# Patient Record
Sex: MALE | Race: WHITE
[De-identification: names, ages, dates, MRNs, and addresses within clinical notes are randomized per-mention and may not be internally consistent; named-entity substitution may affect disease eponyms.]

---

## 2018-10-02 ENCOUNTER — HOSPITAL ENCOUNTER (INPATIENT)
Dept: HOSPITAL 75 - 4TH | Age: 78
LOS: 1 days | Discharge: HOME | DRG: 433 | End: 2018-10-03
Attending: INTERNAL MEDICINE | Admitting: INTERNAL MEDICINE
Payer: MEDICARE

## 2018-10-02 VITALS — DIASTOLIC BLOOD PRESSURE: 79 MMHG | SYSTOLIC BLOOD PRESSURE: 124 MMHG

## 2018-10-02 VITALS — WEIGHT: 179 LBS | BODY MASS INDEX: 26.51 KG/M2 | HEIGHT: 69 IN

## 2018-10-02 VITALS — DIASTOLIC BLOOD PRESSURE: 70 MMHG | SYSTOLIC BLOOD PRESSURE: 118 MMHG

## 2018-10-02 VITALS — DIASTOLIC BLOOD PRESSURE: 82 MMHG | SYSTOLIC BLOOD PRESSURE: 116 MMHG

## 2018-10-02 DIAGNOSIS — D68.9: ICD-10-CM

## 2018-10-02 DIAGNOSIS — K70.31: Primary | ICD-10-CM

## 2018-10-02 DIAGNOSIS — R73.09: ICD-10-CM

## 2018-10-02 DIAGNOSIS — R00.0: ICD-10-CM

## 2018-10-02 DIAGNOSIS — I10: ICD-10-CM

## 2018-10-02 DIAGNOSIS — E87.6: ICD-10-CM

## 2018-10-02 DIAGNOSIS — F10.20: ICD-10-CM

## 2018-10-02 LAB
ALBUMIN SERPL-MCNC: 3 GM/DL (ref 3.2–4.5)
ALP SERPL-CCNC: 65 U/L (ref 40–136)
ALT SERPL-CCNC: 23 U/L (ref 0–55)
BASOPHILS # BLD AUTO: 0 10^3/UL (ref 0–0.1)
BASOPHILS NFR BLD AUTO: 1 % (ref 0–10)
BASOPHILS NFR BLD MANUAL: 0 %
BILIRUB SERPL-MCNC: 1.5 MG/DL (ref 0.1–1)
BUN/CREAT SERPL: 15
CALCIUM SERPL-MCNC: 8.8 MG/DL (ref 8.5–10.1)
CHLORIDE SERPL-SCNC: 101 MMOL/L (ref 98–107)
CO2 SERPL-SCNC: 25 MMOL/L (ref 21–32)
CREAT SERPL-MCNC: 0.66 MG/DL (ref 0.6–1.3)
EOSINOPHIL # BLD AUTO: 0 10^3/UL (ref 0–0.3)
EOSINOPHIL NFR BLD AUTO: 0 % (ref 0–10)
EOSINOPHIL NFR BLD MANUAL: 0 %
ERYTHROCYTE [DISTWIDTH] IN BLOOD BY AUTOMATED COUNT: 11.7 % (ref 10–14.5)
GFR SERPLBLD BASED ON 1.73 SQ M-ARVRAT: > 60 ML/MIN
GLUCOSE SERPL-MCNC: 100 MG/DL (ref 70–105)
HCT VFR BLD CALC: 36 % (ref 40–54)
HGB BLD-MCNC: 13 G/DL (ref 13.3–17.7)
LYMPHOCYTES # BLD AUTO: 1.2 X 10^3 (ref 1–4)
LYMPHOCYTES NFR BLD AUTO: 19 % (ref 12–44)
MACROCYTES BLD QL SMEAR: SLIGHT
MANUAL DIFFERENTIAL PERFORMED BLD QL: YES
MCH RBC QN AUTO: 39 PG (ref 25–34)
MCHC RBC AUTO-ENTMCNC: 36 G/DL (ref 32–36)
MCV RBC AUTO: 108 FL (ref 80–99)
MONOCYTES # BLD AUTO: 1.1 X 10^3 (ref 0–1)
MONOCYTES NFR BLD AUTO: 19 % (ref 0–12)
MONOCYTES NFR BLD: 12 %
NEUTROPHILS # BLD AUTO: 3.7 X 10^3 (ref 1.8–7.8)
NEUTROPHILS NFR BLD AUTO: 61 % (ref 42–75)
NEUTS BAND NFR BLD MANUAL: 70 %
NEUTS BAND NFR BLD: 1 %
PLATELET # BLD: 219 10^3/UL (ref 130–400)
PMV BLD AUTO: 9.8 FL (ref 7.4–10.4)
POTASSIUM SERPL-SCNC: 3.6 MMOL/L (ref 3.6–5)
PROT SERPL-MCNC: 6.7 GM/DL (ref 6.4–8.2)
RBC # BLD AUTO: 3.35 10^6/UL (ref 4.35–5.85)
SODIUM SERPL-SCNC: 139 MMOL/L (ref 135–145)
VARIANT LYMPHS NFR BLD MANUAL: 17 %
WBC # BLD AUTO: 6 10^3/UL (ref 4.3–11)

## 2018-10-02 PROCEDURE — 89051 BODY FLUID CELL COUNT: CPT

## 2018-10-02 PROCEDURE — 85027 COMPLETE CBC AUTOMATED: CPT

## 2018-10-02 PROCEDURE — 82042 OTHER SOURCE ALBUMIN QUAN EA: CPT

## 2018-10-02 PROCEDURE — 80053 COMPREHEN METABOLIC PANEL: CPT

## 2018-10-02 PROCEDURE — 87205 SMEAR GRAM STAIN: CPT

## 2018-10-02 PROCEDURE — 76705 ECHO EXAM OF ABDOMEN: CPT

## 2018-10-02 PROCEDURE — 85025 COMPLETE CBC W/AUTO DIFF WBC: CPT

## 2018-10-02 PROCEDURE — 85610 PROTHROMBIN TIME: CPT

## 2018-10-02 PROCEDURE — 84157 ASSAY OF PROTEIN OTHER: CPT

## 2018-10-02 PROCEDURE — 83615 LACTATE (LD) (LDH) ENZYME: CPT

## 2018-10-02 PROCEDURE — 36415 COLL VENOUS BLD VENIPUNCTURE: CPT

## 2018-10-02 PROCEDURE — 87070 CULTURE OTHR SPECIMN AEROBIC: CPT

## 2018-10-02 PROCEDURE — 85007 BL SMEAR W/DIFF WBC COUNT: CPT

## 2018-10-02 NOTE — HISTORY & PHYSICAL-HOSPITALIST
History of Present Illness


HPI/Chief Complaint


The patient is a 77-year-old white male referred to the hospitalist service by 

Dr. Alexander.  He reported that the patient drank wine on a daily basis.  He had 

advised him to stop some days months ago but that did not happen.  He described 

ascites.  When I asked the patient how long this had been coming on he said 2 

or 3 months.  His wife stated that this was in fact a year or more.  He buys 

wine in 5 L boxes.  She states those last about 3 days.





He describes no other health problems.


Date Seen


10/2/18


Time Seen by a Provider:  12:46


Attending Physician


Farshad Gonzalez MD


PCP


Chi Alexander MD


Referring Physician





Date of Admission


Oct 2, 2018 at 11:24





Home Medications & Allergies


Home Medications


Reviewed patient Home Medication Reconciliation performed by pharmacy 

medication reconciliations technician and/or nursing.


Patients Allergies have been reviewed.





Allergies





Allergies


Coded Allergies


  No Known Drug Allergies (Unverified7/25/11)








Past Medical-Social-Family Hx


Past Med/Social Hx:  Reviewed Nursing Past Med/Soc Hx


Patient Social History


Recent Foreign Travel:  No


Contact w/other who traveled:  No





Immunizations Up To Date


Date of Influenza Vaccine:  Nov 1, 2015





Review of Systems


Constitutional:  see HPI


EENTM:  no symptoms reported


Respiratory:  no symptoms reported


Cardiovascular:  no symptoms reported


Gastrointestinal:  other (distention)


Genitourinary:  no symptoms reported


Musculoskeletal:  no symptoms reported


Skin:  no symptoms reported


Psychiatric/Neurological:  No Symptoms Reported





Physical Exam


Physical Exam


Vital Signs


Capillary Refill :


Height, Weight, BMI


Height: 5'9.00"


Weight: 179lbs. oz. 81.067008nb; 26.43 BMI


Method:


General Appearance:  Mild Distress


Eyes:  Bilateral Eye Normal Inspection


HEENT:  Normal ENT Inspection


Neck:  Normal Inspection


Respiratory:  Chest Non Tender, Lungs Clear, Normal Breath Sounds, No Accessory 

Muscle Use, No Respiratory Distress


Cardiovascular:  Regular Rate, Rhythm, No Edema, No Gallop, No JVD, No Murmur, 

Normal Peripheral Pulses


Gastrointestinal:  Other (the abdomen is distended and tight.  A fluid wave is 

easily produced.)


Back:  Normal Inspection


Extremity:  Other


Neurologic/Psychiatric:  Alert, Oriented x3, No Motor/Sensory Deficits, Normal 

Mood/Affect


Skin:  Normal Color





Results


Results/Procedures


Labs


Patient resulted labs reviewed.





Assessment/Plan


Admission Diagnosis


Alcoholic cirrhosis.  2.ascites.


Admission Status:  Inpatient Order (span 2 midnights)


Reason for Inpatient Admission:  


Management and treatment of ascites





Assessment and Plan


1.alcoholic cirrhosis and ascites.





Plan: Lab studies.  Initiation of spironolactone and diuretics











FARSHAD GONZALEZ MD Oct 2, 2018 12:51
follow up PMD

## 2018-10-02 NOTE — XMS REPORT
Continuity of Care Document

 Created on: 10/02/2018



INDIRA BENSON

External Reference #: [id number]

: 1940

Sex: Male



Demographics







 Address  [address] [place] [address] [place]  [id number]

 

 Home Phone  [phone] x

 

 Preferred Language  Unknown

 

 Marital Status  Unknown

 

 Christian Affiliation  Unknown

 

 Race  Unknown

 

 Ethnic Group  Unknown





Author







 Author  [facility]

 

 Organization  [facility]

 

 Address  Unknown

 

 Phone  Unavailable



              



Allergies

      





 Active            Description            Code            Type            
Severity            Reaction            Onset            Reported/Identified   
         Relationship to Patient            Clinical Status        

 

 Yes            No Known Drug Allergies            [id number]            Drug 
Allergy            Unknown            N/A                         2011   
                               



                  



Medications

      



There is no data.                  



Problems

      





 Date Dx Coded            Attending            Type            Code            
Diagnosis            Diagnosed By        

 

 2011                         Ot            V16.0            FAMILY HX-GI 
MALIGNANCY                     

 

 2011                         Ot            V58.69            OTH MED,LT,
CURRENT USE                     

 

 2011                         Ot            V76.51            SCREEN MAL 
NEOP-COLON                     

 

 2016                         Ot            722.10                       
           

 

 2016                         Ot            722.52                       
           

 

 2016                         Ot            719.46                       
           

 

 2016                         Ot            722.10                       
           

 

 2016                         Ot            722.52                       
           

 

 2016                         Ot            719.46                       
           

 

 2016                         Ot            722.10                       
           

 

 2016                         Ot            722.52                       
           

 

 2016                         Ot            719.46                       
           

 

 2016            DAYANA FOURNIER MD, FACC FACP CCDS            Ot            
I10            ESSENTIAL (PRIMARY) HYPERTENSION                     

 

 2016            DAYANA FOURNIER MD, FACC FACP CCDS            Ot            
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                      

 

 2016            DAYANA FOURNIER MD, FACC FACP CCDS            Ot            
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                     

 

 2016            DAYANA FOURNIER MD, FACC FACP CCDS            Ot            
R00.0            TACHYCARDIA, UNSPECIFIED                     

 

 2016            DAYANA FOURNIER MD, FACC FACP CCDS            Ot            
R91.8            OTHER NONSPECIFIC ABNORMAL FINDING OF JAKE                     

 

 2016            DAYANA FOURNIER MD, FACC FACP CCDS            Ot            
Z79.899            OTHER LONG TERM (CURRENT) DRUG THERAPY                     

 

 03/10/2016            DAYANA FOURNIER MD, FACC FACP CCDS            Ot            
E78.0                                  

 

 03/10/2016            DAYANA FOURNIER MD, FACC FACP CCDS            Ot            
E80.6                                  

 

 03/10/2016            IRLANDA MD FACC, ALI FACP CCDS            Ot            
I10                                  

 

 03/10/2016            IRLANDA SHI FAC, ALI FACP CCDS            Ot            
R00.0                                  

 

 03/10/2016            IRLANDA SHI FAC, ALI FACP CCDS            Ot            
R74.8                                  

 

 03/10/2016            IRLANDA SHI FAC, ALI FACP CCDS            Ot            
I10                                  

 

 03/10/2016            IRLANDA SHI FACC, ALI FACP CCDS            Ot            
I25.10                                  

 

 03/10/2016            IRLANDA SHI [facility], ALI FACP CCDS            Ot            
I25.84                                  

 

 03/10/2016            IRLANDA SHI [facility], ALI FACP CCDS            Ot            
R00.0                                  

 

 03/10/2016            IRLANDA SHI [facility], ALI FACP CCDS            Ot            
R91.8                                  

 

 03/10/2016            IRLANDA SHI [facility], ALI FACP CCDS            Ot            
Z79.899                                  

 

 2016            IRLANDA SHI [facility], ALI FACP CCDS            Ot            
E78.0                                  

 

 2016            IRLANDA SHI [facility], ALI FACP CCDS            Ot            
E80.6                                  

 

 2016            IRLANDA SHI [facility], ALI FACP CCDS            Ot            
I10                                  

 

 2016            IRLANDA SHI [facility], ALI FACP CCDS            Ot            
R00.0                                  

 

 2016            IRLANDA SHI [facility], ALI FACP CCDS            Ot            
R74.8                                  

 

 2016                         Ot            722.10            LUMBAR DISC 
DISPLACEMENT                     

 

 2016                         Ot            722.52            LUMB/
LUMBOSAC DISC DEGEN                     

 

 2016                         Ot            719.46            JOINT PAIN-L
/LEG                     

 

 2016            AURELIA SHI, CONCEPCION R            Ot            R07.9       
     CHEST PAIN, UNSPECIFIED                     

 

 2016            IRLANDA MARTINEZ, ALI FACP CCDS            Ot            
E78.0            PURE HYPERCHOLESTEROLEMIA                     

 

 2016            IRLANDA MARTINEZ, ALI FACP CCDS            Ot            
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                     

 

 2016            IRLANDA MARTINEZ, ALI FACP CCDS            Ot            
I10            ESSENTIAL (PRIMARY) HYPERTENSION                     

 

 2016            IRLANDA MARTINEZ, ALI FACP CCDS            Ot            
R00.0            TACHYCARDIA, UNSPECIFIED                     

 

 2016            IRLANDA MARTINEZ, ALI FACP CCDS            Ot            
R74.8            ABNORMAL LEVELS OF OTHER SERUM ENZYMES                     

 

 2016            IRLANDA MARTINEZ, ALI FACP CCDS            Ot            
E78.0            PURE HYPERCHOLESTEROLEMIA                     

 

 2016            IRLANDA MARTINEZ, ALI FACP CCDS            Ot            
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                     

 

 2016            IRLANDA SHI [facility], ALI FACP CCDS            Ot            
I10            ESSENTIAL (PRIMARY) HYPERTENSION                     

 

 2016            IRLANDA SHI [facility], ALI FACP CCDS            Ot            
R00.0            TACHYCARDIA, UNSPECIFIED                     

 

 2016            IRLANDA SHI [facility], ALI FACP CCDS            Ot            
R74.8            ABNORMAL LEVELS OF OTHER SERUM ENZYMES                     

 

 2018            CONCEPCION MOSES MD R            Ot            R07.9       
     CHEST PAIN, UNSPECIFIED                     

 

 2018            IRLANDA SHI [facility], ALI FACP CCDS            Ot            
E78.0            PURE HYPERCHOLESTEROLEMIA                     

 

 2018            IRLANDA SHI [facility], ALI FACP CCDS            Ot            
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                     

 

 2018            IRLANDA SHI [facility], ALI FACP CCDS            Ot            
I10            ESSENTIAL (PRIMARY) HYPERTENSION                     

 

 2018            IRLANDA SHI [facility], ALI FACP CCDS            Ot            
R00.0            TACHYCARDIA, UNSPECIFIED                     

 

 2018            IRLANDA SHI [facility], ALI FACP CCDS            Ot            
R74.8            ABNORMAL LEVELS OF OTHER SERUM ENZYMES                     

 

 2018            IRLANDA SHI [facility], ALI FACP CCDS            Ot            
E78.0            PURE HYPERCHOLESTEROLEMIA                     

 

 2018            IRLANDA SHI [facility], ALI FACP CCDS            Ot            
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                     

 

 2018            IRLANDA SHI [facility], ALI FACP CCDS            Ot            
I10            ESSENTIAL (PRIMARY) HYPERTENSION                     

 

 2018            IRLANDA SHI [facility], ALI FACP CCDS            Ot            
R00.0            TACHYCARDIA, UNSPECIFIED                     

 

 2018            IRLANDA SHI [facility], ALI FACP CCDS            Ot            
R74.8            ABNORMAL LEVELS OF OTHER SERUM ENZYMES                     

 

 2018            CONCEPCION MOSES MD            Ot            J90         
   PLEURAL EFFUSION, NOT ELSEWHERE CLASSIFI                     

 

 2018            CONCEPCION MOSES MD            Ot            J98.11      
      ATELECTASIS                     

 

 2018            CONCEPCION MOSES MD            Ot            K57.30      
      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                     

 

 2018            CONCEPCION MOSES MD            Ot            K76.0       
     FATTY (CHANGE OF) LIVER, NOT ELSEWHERE C                     

 

 2018            CONCEPCION MOSES MD            Ot            R18.8       
     OTHER ASCITES                     

 

 2018            CONCEPCION MOSES MD            Ot            R85.89      
      OTH ABNORMAL FINDINGS IN SPECIMENS FROM                      



                                                                               
                                                                   



Procedures

      



There is no data.                  



Results

      



There is no data.              



Encounters

      





 ACCT No.            Visit Date/Time            Discharge            Status    
        Pt. Type            Provider            Facility            Loc./Unit  
          Complaint        

 

 [id number]            2018 09:22:00            2018 23:59:59    
        CLS            Outpatient            CONCEPCION MOSES MD            [facility]            RAD            ABN FINDINGS IN 
SPECIMENS FROM DIGESTIVE ORGANS        

 

 [id number]            2016 09:05:00            2016 15:00:00    
        DIS            Outpatient            IRLANDA HSI FACC, ALI FACP CCDS     
       [facility]            CATH            WIDE COMPLEX 
TACHYCARDIA,HTN,HLP        

 

 [id number]            2016 07:34:00            2016 23:59:59    
        CLS            Outpatient            IRLANDA SHI FACC, ALI FACP CCDS     
       [facility]            RAD            
HYPERBILIRUBIN        

 

 [id number]            2016 09:53:00            2016 23:59:59    
        CLS            Outpatient            IRLANDA SHI FACC, ALI FACP CCDS     
       [facility]            CARD            TACHYCARDIA 
       

 

 [id number]            2016 07:12:00            2016 23:59:59    
        CLS            Outpatient            CONCEPCION MOSES MD            [facility]            CARD            SOA,CHEST PAIN        

 

 [id number]            10/30/2012 14:23:00                                   
   Document Registration                                                       
     

 

 [id number]            2011 09:11:00                                   
   Document Registration                                                       
     

 

 [id number]            2011 12:28:00                                   
   Document Registration

## 2018-10-03 VITALS — DIASTOLIC BLOOD PRESSURE: 76 MMHG | SYSTOLIC BLOOD PRESSURE: 112 MMHG

## 2018-10-03 VITALS — DIASTOLIC BLOOD PRESSURE: 71 MMHG | SYSTOLIC BLOOD PRESSURE: 107 MMHG

## 2018-10-03 VITALS — DIASTOLIC BLOOD PRESSURE: 72 MMHG | SYSTOLIC BLOOD PRESSURE: 110 MMHG

## 2018-10-03 VITALS — SYSTOLIC BLOOD PRESSURE: 106 MMHG | DIASTOLIC BLOOD PRESSURE: 72 MMHG

## 2018-10-03 VITALS — SYSTOLIC BLOOD PRESSURE: 107 MMHG | DIASTOLIC BLOOD PRESSURE: 63 MMHG

## 2018-10-03 LAB
ALBUMIN FLD-MCNC: 1.2 G/DL
ALBUMIN SERPL-MCNC: 3.1 GM/DL (ref 3.2–4.5)
ALP SERPL-CCNC: 65 U/L (ref 40–136)
ALT SERPL-CCNC: 22 U/L (ref 0–55)
APPEARANCE FLD: (no result)
BASOPHILS # BLD AUTO: 0 10^3/UL (ref 0–0.1)
BASOPHILS NFR BLD AUTO: 1 % (ref 0–10)
BILIRUB SERPL-MCNC: 1.7 MG/DL (ref 0.1–1)
BODY FLUID SOURCE: (no result)
BUN/CREAT SERPL: 14
CALCIUM SERPL-MCNC: 8.9 MG/DL (ref 8.5–10.1)
CHLORIDE SERPL-SCNC: 103 MMOL/L (ref 98–107)
CO2 SERPL-SCNC: 24 MMOL/L (ref 21–32)
COLOR FLD: YELLOW
CREAT SERPL-MCNC: 0.79 MG/DL (ref 0.6–1.3)
EOSINOPHIL # BLD AUTO: 0 10^3/UL (ref 0–0.3)
EOSINOPHIL NFR BLD AUTO: 0 % (ref 0–10)
ERYTHROCYTE [DISTWIDTH] IN BLOOD BY AUTOMATED COUNT: 12.1 % (ref 10–14.5)
GFR SERPLBLD BASED ON 1.73 SQ M-ARVRAT: > 60 ML/MIN
GLUCOSE SERPL-MCNC: 202 MG/DL (ref 70–105)
HCT VFR BLD CALC: 39 % (ref 40–54)
HGB BLD-MCNC: 13.5 G/DL (ref 13.3–17.7)
INR PPP: 1.3 (ref 0.8–1.4)
LDH FLD-CCNC: 67 U/L
LYMPHOCYTES # BLD AUTO: 1.1 X 10^3 (ref 1–4)
LYMPHOCYTES NFR BLD AUTO: 20 % (ref 12–44)
LYMPHOCYTES NFR FLD MANUAL: 74 %
MANUAL DIFFERENTIAL PERFORMED BLD QL: NO
MCH RBC QN AUTO: 38 PG (ref 25–34)
MCHC RBC AUTO-ENTMCNC: 35 G/DL (ref 32–36)
MCV RBC AUTO: 111 FL (ref 80–99)
MONOCYTES # BLD AUTO: 0.9 X 10^3 (ref 0–1)
MONOCYTES NFR BLD AUTO: 17 % (ref 0–12)
NEUTROPHILS # BLD AUTO: 3.4 X 10^3 (ref 1.8–7.8)
NEUTROPHILS NFR BLD AUTO: 62 % (ref 42–75)
OTHER CELLS FLD MANUAL: 0 %
PLATELET # BLD: 250 10^3/UL (ref 130–400)
PMV BLD AUTO: 10.1 FL (ref 7.4–10.4)
POTASSIUM SERPL-SCNC: 3.2 MMOL/L (ref 3.6–5)
PROT FLD-MCNC: 2.2 G/DL
PROT SERPL-MCNC: 6.9 GM/DL (ref 6.4–8.2)
PROTHROMBIN TIME: 16.1 SEC (ref 12.2–14.7)
RBC # BLD AUTO: 3.53 10^6/UL (ref 4.35–5.85)
RBC # FLD: 48 /UL
SODIUM SERPL-SCNC: 138 MMOL/L (ref 135–145)
WBC # BLD AUTO: 5.4 10^3/UL (ref 4.3–11)
WBC # FLD: 227 /UL

## 2018-10-03 PROCEDURE — 0W9G30Z DRAINAGE OF PERITONEAL CAVITY WITH DRAINAGE DEVICE, PERCUTANEOUS APPROACH: ICD-10-PCS | Performed by: SURGERY

## 2018-10-03 NOTE — DIAGNOSTIC IMAGING REPORT
PROCEDURE: US Abdomen, limited.



TECHNIQUE: Multiple realtime grayscale images were obtained over

the abdomen in various projections.



INDICATION: Ascites.



FINDINGS: Sonographic interrogation was performed to evaluate

ascites. There appears to be large amount of abdominal ascites. A

marking was made in the right lower quadrant for paracentesis.



IMPRESSION: Large abdominal ascites.



Dictated by: 



  Dictated on workstation # [id number]

## 2018-10-03 NOTE — PROGRESS NOTE-HOSPITALIST
Subjective


HPI/CC On Admission


The patient is a 77-year-old white male referred to the hospitalist service by 

Dr. Alexander.  He reported that the patient drank wine on a daily basis.  He had 

advised him to stop some days months ago but that did not happen.  He described 

ascites.  When I asked the patient how long this had been coming on he said 2 

or 3 months.  His wife stated that this was in fact a year or more.  He buys 

wine in 5 L boxes.  She states those last about 3 days.





He describes no other health problems.





Objective


Exam


Vital Signs





Vital Signs








  Date Time  Temp Pulse Resp B/P (MAP) Pulse Ox O2 Delivery O2 Flow Rate FiO2


 


10/3/18 08:00 99.1 113 20 107/71 (83) 95 Room Air  





Capillary Refill : Less Than 3 Seconds





Results/Procedures


Lab


Laboratory Tests


10/2/18 12:46








10/3/18 10:25








Patient resulted labs reviewed.





Clinical Quality Measures


DVT/VTE Risk/Contraindication:


Risk Factor Score Per Nursing:  3


RFS Level Per Nursing on Admit:  3=High











KEDAR SANFORD DO Oct 3, 2018 10:49

## 2018-10-03 NOTE — OPERATIVE REPORT
DATE OF SERVICE:  10/03/2018



ATTENDING PRIMARY CARE PHYSICIAN:

Chi Alexander MD.



ADMITTING PHYSICIAN:

Dr. Alexandre.



PREPROCEDURE DIAGNOSIS:

Symptomatic ascites.



POSTPROCEDURE DIAGNOSIS:

Symptomatic ascites.



PROCEDURE:

Paracentesis.



SURGEON:

Brenda Hastings MD.



ANESTHESIA:

Local.



ESTIMATED BLOOD LOSS:

Minimal.



FINDINGS:

Straw yellow transudative fluid.



DISPOSITION:

The patient tolerated the procedure well.



INDICATIONS:

The patient is a 77-year-old male, who presented with significant abdominal

distention, which has become symptomatic.  The patient and wife report that they

first noticed some mild abdominal distention one year ago; however, this has

increased over the year and in the past few weeks has increased significantly. 

He does have abdominal distention as well as bilateral lower extremity edema. 

Upon further questioning, he reports that he does drink a significant amount of

wine on a daily basis for many years.  Upon examination, he does have a firmly

distended abdomen with a positive fluid shift wave consistent with ascites.



DESCRIPTION OF PROCEDURE:

The abdomen was prepped and draped in the standard surgical fashion.  Before the

procedure, an ultrasound evaluation was performed and marked in the right lower

abdominal quadrant.



A 1% lidocaine was used to anesthetize the skin, fascia, muscle layers as well

as the peritoneal lining.  A vertical skin incision was made using an 11-blade. 

The trocar and catheter were then advanced withdrawing of straw yellow

transudative fluid.  The catheter was then advanced over the trocar without any

resistance.  The catheter was then placed into tubing and gravity drainage bag. 

The catheter was then cleaned and covered with gauze followed by a large

Op-Site.



The patient tolerated the procedure well.  We will send the fluid off for

analysis.  Once he is asymptomatic and significant amount of fluid has been

drained, we will remove the catheter and he may be discharged home.





Job ID: [id number]

DocumentID: [id number]

Dictated Date:  10/03/2018 12:09:52

Transcription Date: 10/03/2018 14:30:57

Dictated By: BRENDA HASTINGS MD

## 2018-10-03 NOTE — CONSULTATION REPORT
DATE OF SERVICE:  10/03/2018



ATTENDING PRIMARY CARE PHYSICIAN:

Dr. Alexander.



ADMITTING PHYSICIAN:

Dr. Alexandre.



HISTORY OF PRESENT ILLNESS:

The patient is a 77-year-old male who presented with abdominal distention.  He

reports that this has been first noticed approximately one year ago; however,

has worsened significantly especially in the past few days.  He has a positive

fluid shift wave consistent with ascites.  He also did have a CT scan of the

abdomen performed in late 08/2018, which did show ascites as well.  He does have

a significant past history of alcohol and states that he quit recently.  He

reports that he drinks a significant amount of wine daily.  On examination, he

does have a distended abdomen with a positive fluid shift wave which is

causing him to experience pressure on his diaphragm and difficulty in breathing 
on

an intermittent basis.



PAST MEDICAL HISTORY:

Tachycardia.



PAST SURGICAL HISTORY:

Left knee arthroscopy and pilonidal cystectomy.



ALLERGIES:

No known drug allergies.



MEDICATIONS:

Metoprolol 25 mg daily, aspirin 81 mg daily, folic acid daily.



SOCIAL HISTORY:

Positive alcohol, he drinks significant alcohol with wine daily for many years. 

Negative smoke.



FAMILY HISTORY:

Noncontributory.



VITAL SIGNS:

Temperature 99.1, blood pressure 107/71, pulse 113, respirations 20, pulse ox

95% on room air.



REVIEW OF SYSTEMS:

Well-nourished male currently in no acute distress.  He is not experiencing any

shortness of breath, no difficulty breathing.  He reports that with his

abdominal distention, upon lying, he will have some shortness of breath on an

intermittent basis.  No cough or sputum production.  No nausea, vomiting, no

diarrhea or constipation, no red blood per rectum, no dark tarry stools.  No

fever, chills, no recent inadvertent weight loss.  All other review of systems

is negative.



PHYSICAL EXAMINATION:

CHEST:  Clear.  Good breath sounds bilaterally.

HEART:  Regular, no murmurs.

EXTREMITIES:  +2/3 bilateral lower extremity edema.  Negative Homans sign.

HEENT:  No scleral icterus.

NECK:  No cervical lymphadenopathy.

ABDOMEN:  Distended, soft with a positive fluid shift wave.  There is no

peritonitis.

SKIN:  Warm, dry.



LABORATORY DATA:

WBC 5.4, hemoglobin 13.5, hematocrit 39, platelets 250, total bilirubin 1.7,

albumin 3.1, INR 1.3.



ASSESSMENT AND PLAN:

A 77-year-old male with symptomatic ascites, most likely secondary to liver

cirrhosis.  At this time, it appears that his modified Child-Olsen classification

is approximately 7, which is a class B.  We will proceed with paracentesis and

send the fluid off for analysis.  We will also recommend continued medical

management as prescribed earlier with salt restriction and cessation of alcohol

as well as the diuretics with spironolactone and Lasix.  We will proceed with a

paracentesis as well for symptomatic control.





Job ID: [id number]

DocumentID: [id number]

Dictated Date:  10/03/2018 12:06:54

Transcription Date: 10/03/2018 13:28:19

Dictated By: BRENDA WELCH MD

MTDD

## 2018-10-03 NOTE — DISCHARGE SUMMARY-HOSPITALIST
KEDAR SANFORD DO 10/3/18 1154:


Diagnosis/Chief Complaint


Date of Admission


Oct 2, 2018 at 11:24


Date of Discharge





Discharge Date:  Oct 3, 2018


Admission Diagnosis


Alcoholic cirrhosis.  2.ascites.


Discharge Diagnosis





(1) Cirrhosis of liver with ascites


Status:  Chronic


(2) Hypokalemia


Status:  Acute


(3) Blood glucose elevated


Status:  Acute


(4) Tachycardia


Status:  Chronic


(5) Hypertension


Status:  Chronic


(6) Alcoholism


Status:  Chronic


(7) Coagulopathy


Status:  Chronic





Discharge Summary


Discharge Physical Exam


Allergies:  


Coded Allergies:  


     No Known Drug Allergies (Unverified , 7/25/11)


Vitals & I&Os





Vital Signs








  Date Time  Temp Pulse Resp B/P (MAP) Pulse Ox O2 Delivery O2 Flow Rate FiO2


 


10/3/18 16:15 98.2 100 16 110/72 (85) 94 Room Air  








General Appearance:  No Apparent Distress, WD/WN, Chronically ill


Respiratory:  Chest Non Tender, Lungs Clear, Normal Breath Sounds, No Accessory 

Muscle Use, No Respiratory Distress


Cardiovascular:  Regular Rate, Rhythm, No Edema, No Gallop, No JVD, No Murmur, 

Normal Peripheral Pulses


Gastrointestinal:  Soft


Neurologic/Psychiatric:  Alert, Oriented x3, No Motor/Sensory Deficits, Normal 

Mood/Affect





Hospital Course


Hospital course: patient had a brief hospital course. Patient was admitted for 

ascites and lab evaluation for new onset cirrhosis. ETOHism confirmed. USG 

obtained and Dr Hastings performed a paracentesis and removed 1.5 liters of straw-

colored fluid. Pt had no evidence of SBP. Lasix and Aldactone were started and 

will be continued as outpt with close f/u with PCP. ETOHism cessation counseled.


Labs (last 24 hrs)


Laboratory Tests


10/3/18 10:25: 


White Blood Count 5.4, Red Blood Count 3.53L, Hemoglobin 13.5, Hematocrit 39L, 

Mean Corpuscular Volume 111H, Mean Corpuscular Hemoglobin 38H, Mean Corpuscular 

Hemoglobin Concent 35, Red Cell Distribution Width 12.1, Platelet Count 250, 

Mean Platelet Volume 10.1, Neutrophils (%) (Auto) 62, Lymphocytes (%) (Auto) 20

, Monocytes (%) (Auto) 17H, Eosinophils (%) (Auto) 0, Basophils (%) (Auto) 1, 

Neutrophils # (Auto) 3.4, Lymphocytes # (Auto) 1.1, Monocytes # (Auto) 0.9, 

Eosinophils # (Auto) 0.0, Basophils # (Auto) 0.0, Prothrombin Time 16.1H, INR 

Comment 1.3, Sodium Level 138, Potassium Level 3.2L, Chloride Level 103, Carbon 

Dioxide Level 24, Anion Gap 11, Blood Urea Nitrogen 11, Creatinine 0.79, 

Estimat Glomerular Filtration Rate > 60, BUN/Creatinine Ratio 14, Glucose Level 

202H, Calcium Level 8.9, Corrected Calcium 9.6, Total Bilirubin 1.7H, Aspartate 

Amino Transf (AST/SGOT) 36H, Alanine Aminotransferase (ALT/SGPT) 22, Alkaline 

Phosphatase 65, Total Protein 6.9, Albumin 3.1L


10/3/18 11:45: 


Body Fluid Source PERITON, Body Fluid Color YELLOW, Body Fluid Appearance SLT 

CLDY, Body Fluid , Body Fluid RBC 48, Body Fluid Polynuclear WBCs 5, 

Body Fluid Mononuclear WBCs 21, Body Fluid Lymphocytes 74, Body Fluid Other 

Cells 0, Body Fluid Total Protein 2.2, Body Fluid Albumin 1.2, Body Fluid 

Lactate Dehydrogenase 67





Microbiology


10/3/18 Gram Stain, Resulted


          Pending


10/3/18 Body Fluid Culture - Preliminary, Resulted


          Sent To [facility]


Patient resulted labs reviewed.


Pending Labs








Discussion & Recommendations


Discharge Planning:  <30 minutes discharge planning





Discharge


Home Medications:





Active Scripts


Active


Aldactone (Spironolactone) 50 Mg Tablet 50 Mg PO DAILY


Lasix (Furosemide) 20 Mg Tablet 20 Mg PO DAILY


Reported


Vitamin B Complex 1 Each Capsule 1 Cap PO MOWEFR


Aspirin EC (Aspirin) 81 Mg Tablet.dr 81 Mg PO MOWEFR


Metoprolol Succinate 25 Mg Tab.er.24h 25 Mg PO DAILY


Folic Acid 0.8 Mg Capsule 0.8 Mg PO MOWEFR





Instructions to patient/family


Please see electronic discharge instructions given to patient.





Clinical Quality Measures


DVT/VTE Risk/Contraindication:


Risk Factor Score Per Nursing:  3


RFS Level Per Nursing on Admit:  3=High





MER JC MEDICAL STUDENT 10/3/18 1341:


Diagnosis/Chief Complaint


Discharge Time:  13:00


Admission Diagnosis


Alcoholic Cirrhosis


Ascites


Discharge Diagnosis


Alcoholic Cirrhosis 


Ascites


Tachycardia


Hypokalemia


Elevated blood glucose


HTN





(1) Cirrhosis of liver with ascites


Status:  Chronic


(2) Tachycardia


Status:  Chronic


(3) Blood glucose elevated


Status:  Acute


(4) Hypokalemia


Status:  Acute


(5) Hypertension


Status:  Chronic





Discharge Summary


Procedures/Consulations


Surgery





Discharge Physical Exam


Allergies:  


Coded Allergies:  


     No Known Drug Allergies (Unverified , 7/25/11)


General Appearance:  No Apparent Distress, WD/WN


HEENT:  Normal ENT Inspection


Respiratory:  Chest Non Tender, Lungs Clear, Normal Breath Sounds, No Accessory 

Muscle Use, No Respiratory Distress


Cardiovascular:  Regular Rate, Rhythm, No Edema, No Gallop, No JVD, No Murmur, 

Normal Peripheral Pulses


Gastrointestinal:  No Pulsatile Mass, Non Tender, Distended; No Guarding, No 

Tenderness


Extremity:  Normal Capillary Refill, Normal Range of Motion, Pedal Edema (3+)


Skin:  Normal Color, Warm/Dry


Neurologic/Psychiatric:  Alert, Oriented x3





Hospital Course


Radiology Reviewed


Date of Exam:10/03/18





US ABDOMEN LIMITED [id number]








PROCEDURE: US Abdomen, limited.





TECHNIQUE: Multiple realtime grayscale images were obtained over


the abdomen in various projections.





INDICATION: Ascites.





FINDINGS: Sonographic interrogation was performed to evaluate


ascites. There appears to be large amount of abdominal ascites. A


marking was made in the right lower quadrant for paracentesis.





IMPRESSION: Large abdominal ascites.


Imaging:  Reviewed Imaging Report





Discussion & Recommendations


Discharge Planning:  <30 minutes discharge planning


Patient is a 76 yo WM who was referred to the hospitalist service by his PCP 

Dr. Alexander on 10/2/18. Beyond his abdomen he is generally well appearing and 

presents without other pain or complaint. He was started on spironolactone and 

lasix. Consult to Surgery, Dr. Bedlola, was made on 10/3 to drain the ascitic 

abdomen. He will be discharged today with the recommendation of a salt 

restrictive diet, spironolactone, lasix and instructions to f/u with Dr. Alexander. 





Per his hospital record he is chronically tachycardic, will d/c with 

instructions to restart metoprolol. 





Patient developed mild hypokalemia during hospital stay, which was addressed.





His blood sugar on 10/3 was elevated. Does not have prior diagnosis of DM. This 

should receive f/u with PCP.





Discharge


Condition at discharge


Stable





Problem Qualifiers





(1) Cirrhosis of liver with ascites:  


Hepatic cirrhosis type:  alcoholic cirrhosis  Qualified Codes:  K70.31 - 

Alcoholic cirrhosis of liver with ascites








KEDAR SANFORD DO Oct 3, 2018 11:54


MER JC MEDICAL STUDENT Oct 3, 2018 13:41

## 2018-10-03 NOTE — PROGRESS NOTE-POST OPERATIVE
Post-Operative Progess Note


Surgeon (s)/Assistant (s)


Surgeon


BRENDA WELCH MD


Assistant:  none





Pre-Operative Diagnosis


symptomatic ascites





Post-Operative Diagnosis





same





Procedure & Operative Findings


Date of Procedure


10/3/18


Procedure Performed/Findings


paracentesis.


Anesthesia Type


local





Estimated Blood Loss


Estimated blood loss (mL):  minimal





Specimens/Packing


Specimens Removed


ascites fluid











BRENDA WELCH MD Oct 3, 2018 11:46 am

## 2019-06-18 ENCOUNTER — HOSPITAL ENCOUNTER (OUTPATIENT)
Dept: HOSPITAL 75 - CARD | Age: 79
End: 2019-06-18
Attending: NURSE PRACTITIONER
Payer: MEDICARE

## 2019-06-18 VITALS — SYSTOLIC BLOOD PRESSURE: 150 MMHG | DIASTOLIC BLOOD PRESSURE: 85 MMHG

## 2019-06-18 DIAGNOSIS — I25.10: Primary | ICD-10-CM

## 2019-06-18 DIAGNOSIS — I10: ICD-10-CM

## 2019-06-18 PROCEDURE — 78452 HT MUSCLE IMAGE SPECT MULT: CPT

## 2019-06-18 PROCEDURE — 93017 CV STRESS TEST TRACING ONLY: CPT

## 2019-06-18 NOTE — STRESS TEST
DATE OF SERVICE:  06/18/2019



RESTING AND POSTEXERCISE TECHNETIUM-99M TETROFOSMIN SPECT CT IMAGING



ORDERING PHYSICIAN:

LUCA Morales.



OTHER PHYSICIAN:

DAYANA FOURNIER MD, MA, FACP, FACC.



CLINICAL DIAGNOSES:

Coronary artery disease.



Baseline images were carried out after injection of 10.35 mCi of technetium-99m

Tetrofosmin.  This was followed by exercise on a treadmill.  Blood pressure and

heart rate responses to exercise were normal.  A 31 mCi of technetium-99m

Tetrofosmin were injected after the patient indicated that he would not be able

for more than another minute.  The test was stopped on account of fatigue. 

There was no significant ST segment deviation.  There was no significant

arrhythmia.  The patient attained a workload of 5.4 METs.  He attained 95% of

maximum predicted heart rate.  Review of images at rest and following stress

does not indicate any significant perfusion defects consistent with significant

myocardial ischemia or infarction.  Some attenuation is seen of the

diaphragmatic wall of the left ventricle, both at rest and following exercise. 

Gated images show normal regional wall motion, including the diaphragmatic wall

of the left ventricle.  Left ventricular ejection fraction is calculated to be

72%.  Left ventricular end diastolic volume is 44 mL.  TID is absent (1.02).



CONCLUSIONS:

1.  No evidence of significant myocardial ischemia or infarction on this study,

but the patient exhibited low exercise capacity.

2.  Normal regional wall motion.

3.  Normal global left ventricular systolic function with a calculated ejection

fraction of 72%.





Job ID: [id number]

DocumentID: [id number]

Dictated Date:  06/18/2019 18:51:48

Transcription Date: 06/18/2019 21:15:21

Dictated By: DAYANA FOURNIER MD, MA, FACP, FACC,

## 2020-08-14 ENCOUNTER — HOSPITAL ENCOUNTER (OUTPATIENT)
Dept: HOSPITAL 75 - PREOP | Age: 80
Discharge: HOME | End: 2020-08-14
Attending: SPECIALIST
Payer: MEDICARE

## 2020-08-14 VITALS — BODY MASS INDEX: 29.01 KG/M2 | WEIGHT: 200.4 LBS | HEIGHT: 69.69 IN

## 2020-08-14 DIAGNOSIS — Z01.818: Primary | ICD-10-CM

## 2020-08-21 ENCOUNTER — HOSPITAL ENCOUNTER (OUTPATIENT)
Dept: HOSPITAL 75 - SDC | Age: 80
Discharge: HOME | End: 2020-08-21
Attending: SPECIALIST
Payer: MEDICARE

## 2020-08-21 VITALS — DIASTOLIC BLOOD PRESSURE: 72 MMHG | SYSTOLIC BLOOD PRESSURE: 119 MMHG

## 2020-08-21 VITALS — DIASTOLIC BLOOD PRESSURE: 77 MMHG | SYSTOLIC BLOOD PRESSURE: 120 MMHG

## 2020-08-21 VITALS — HEIGHT: 69.69 IN

## 2020-08-21 DIAGNOSIS — Z79.899: ICD-10-CM

## 2020-08-21 DIAGNOSIS — I10: ICD-10-CM

## 2020-08-21 DIAGNOSIS — Z79.82: ICD-10-CM

## 2020-08-21 DIAGNOSIS — Z87.891: ICD-10-CM

## 2020-08-21 DIAGNOSIS — M19.91: ICD-10-CM

## 2020-08-21 DIAGNOSIS — H25.12: Primary | ICD-10-CM

## 2020-08-21 PROCEDURE — 66984 XCAPSL CTRC RMVL W/O ECP: CPT

## 2020-08-21 RX ADMIN — MOXIFLOXACIN ONE ML: 5 SOLUTION/ DROPS OPHTHALMIC at 07:45

## 2020-08-21 RX ADMIN — LIDOCAINE HYDROCHLORIDE PRN ML: 10 INJECTION, SOLUTION EPIDURAL; INFILTRATION; INTRACAUDAL; PERINEURAL at 07:45

## 2020-08-21 RX ADMIN — PHENYLEPHRINE HYDROCHLORIDE SCH ML: 100 SOLUTION/ DROPS OPHTHALMIC at 07:00

## 2020-08-21 RX ADMIN — TETRACAINE HYDROCHLORIDE PRN ML: 5 SOLUTION OPHTHALMIC at 07:06

## 2020-08-21 RX ADMIN — TIMOLOL MALEATE PRN ML: 5 SOLUTION OPHTHALMIC at 08:13

## 2020-08-21 RX ADMIN — CYCLOPENTOLATE HYDROCHLORIDE SCH ML: 10 SOLUTION/ DROPS OPHTHALMIC at 07:00

## 2020-08-21 RX ADMIN — TETRACAINE HYDROCHLORIDE PRN ML: 5 SOLUTION OPHTHALMIC at 07:00

## 2020-08-21 RX ADMIN — PHENYLEPHRINE HYDROCHLORIDE SCH ML: 100 SOLUTION/ DROPS OPHTHALMIC at 07:06

## 2020-08-21 RX ADMIN — PHENYLEPHRINE HYDROCHLORIDE SCH ML: 100 SOLUTION/ DROPS OPHTHALMIC at 07:15

## 2020-08-21 RX ADMIN — TETRACAINE HYDROCHLORIDE PRN ML: 5 SOLUTION OPHTHALMIC at 06:50

## 2020-08-21 RX ADMIN — TIMOLOL MALEATE PRN ML: 5 SOLUTION OPHTHALMIC at 07:45

## 2020-08-21 RX ADMIN — LIDOCAINE HYDROCHLORIDE PRN ML: 10 INJECTION, SOLUTION EPIDURAL; INFILTRATION; INTRACAUDAL; PERINEURAL at 08:13

## 2020-08-21 RX ADMIN — MOXIFLOXACIN ONE ML: 5 SOLUTION/ DROPS OPHTHALMIC at 08:13

## 2020-08-21 RX ADMIN — TETRACAINE HYDROCHLORIDE PRN ML: 5 SOLUTION OPHTHALMIC at 07:15

## 2020-08-21 RX ADMIN — CYCLOPENTOLATE HYDROCHLORIDE SCH ML: 10 SOLUTION/ DROPS OPHTHALMIC at 07:15

## 2020-08-21 RX ADMIN — CYCLOPENTOLATE HYDROCHLORIDE SCH ML: 10 SOLUTION/ DROPS OPHTHALMIC at 07:06

## 2020-08-21 NOTE — OPHTHALMOLOGY OPERATIVE REPORT
Cataract removal/placement IOL


PREOPERATIVE DIAGNOSIS:    Cataract Left Eye


POSTOPERATIVE DIAGNOSIS: Cataract Left Eye





PROCEDURE: Cataract removal and placement of posterior chamber implant, left eye





SURGEON: Evan Magaña 





ANESTHESIA: Topical with sedation





COMPLICATIONS: None





ESTIMATED BLOOD LOSS: Minimal 





DESCRIPTION OF PROCEDURE:


After proper informed consent was obtained, the patient, a 79 male, was taken to

the Operating Room and the left eye was anesthetized with tetracaine.  The left 

eye was then prepped and draped in the usual manner.  A wire lid speculum was 

placed. A paracentesis was made at the left hand position. Preservative free 

lidocaine was injected into the anterior chamber followed by viscoelastic.  A 

clear corneal incision was made in the temporal position. A capsulorrhexis was 

preformed and the central nuclear and cortical material were removed.  The 

posterior capsule was polished and an Sandro  23.0 [id number] was placed into the 

capsular bag. The residual viscoelastic was aspirated and balanced saline 

solution was injected into the anterior chamber.  Moxifloxacin was injected into

the anterior chamber.





The wound was checked and found to be water tight.





The patient tolerated the procedure well without complications.











EVAN MAGAÑA MD             Aug 21, 2020 08:27

## 2020-08-21 NOTE — ANESTHESIA-GENERAL POST-OP
MAC


Patient Condition


Mental Status/LOC:  Same as Preop


Cardiovascular:  Satisfactory


Nausea/Vomiting:  Absent


Respiratory:  Satisfactory


Pain:  Controlled


Complications:  Absent





Post Op Complications


Complications


None





Follow Up Care/Instructions


Patient Instructions


None needed.





Anesthesiology Discharge Order


Discharge Order


Patient is doing well, no complaints, stable vital signs, no apparent adverse 

anesthesia problems.   


No complications reported per nursing.











ILYA LUCAS CRNA           Aug 21, 2020 10:55

## 2020-08-21 NOTE — OPHTHALMOLOGIST PRE-OP NOTE
Pre-Operative Progress Note


H&P Reviewed


The H&P was reviewed, patient examined and no changes noted.


Date H&P Reviewed:  Aug 21, 2020


Time H&P Reviewed:  07:59


Pre-Op Dx


Cataract, Left Eye











MARILU MAGAÑA MD             Aug 21, 2020 07:59

## 2020-08-28 ENCOUNTER — HOSPITAL ENCOUNTER (OUTPATIENT)
Dept: HOSPITAL 75 - SDC | Age: 80
Discharge: HOME | End: 2020-08-28
Attending: SPECIALIST
Payer: MEDICARE

## 2020-08-28 VITALS — DIASTOLIC BLOOD PRESSURE: 74 MMHG | SYSTOLIC BLOOD PRESSURE: 109 MMHG

## 2020-08-28 VITALS — WEIGHT: 195.33 LBS | HEIGHT: 67.72 IN | BODY MASS INDEX: 29.95 KG/M2

## 2020-08-28 DIAGNOSIS — Z83.3: ICD-10-CM

## 2020-08-28 DIAGNOSIS — Z80.3: ICD-10-CM

## 2020-08-28 DIAGNOSIS — H26.491: Primary | ICD-10-CM

## 2020-08-28 RX ADMIN — TROPICAMIDE PRN ML: 10 SOLUTION/ DROPS OPHTHALMIC at 07:12

## 2020-08-28 RX ADMIN — TROPICAMIDE PRN ML: 10 SOLUTION/ DROPS OPHTHALMIC at 07:02

## 2020-08-28 RX ADMIN — TETRACAINE HYDROCHLORIDE PRN ML: 5 SOLUTION OPHTHALMIC at 07:02

## 2020-08-28 RX ADMIN — PHENYLEPHRINE HYDROCHLORIDE PRN ML: 100 SOLUTION/ DROPS OPHTHALMIC at 07:12

## 2020-08-28 RX ADMIN — PHENYLEPHRINE HYDROCHLORIDE PRN ML: 100 SOLUTION/ DROPS OPHTHALMIC at 07:02

## 2020-08-28 RX ADMIN — TETRACAINE HYDROCHLORIDE PRN ML: 5 SOLUTION OPHTHALMIC at 07:12

## 2020-08-28 NOTE — OPHTHALMOLOGY OPERATIVE REPORT
YAG Capsulotomy


PREOPERATIVE DIAGNOSIS:    Secondary Cataract Right Eye


POSTOPERATIVE DIAGNOSIS: Secondary Cataract Right Eye





PROCEDURE: YAG Capsulotomy, right eye





SURGEON: Evan Magaña 





ANESTHESIA: Topical anesthesia





COMPLICATIONS: None





ESTIMATED BLOOD LOSS: Minimal 





DESCRIPTION OF PROCEDURE:


After proper informed consent was obtained, the patient's, a 79 male, right eye 

received one drop of Tropicamide and one drop of Tetracaine. The patient was 

then placed at the YAG laser and using a power of [3.8 ] millijoules and [17 ] 

bursts were used to fashion a central capsulotomy. The patient tolerated the 

procedure well without complications.











EVAN MAGAÑA MD             Aug 28, 2020 07:47

## 2020-08-28 NOTE — OPHTHALMOLOGIST PRE-OP NOTE
Pre-Operative Progress Note


H&P Reviewed


The H&P was reviewed, patient examined and no changes noted.


Date H&P Reviewed:  Aug 28, 2020


Time H&P Reviewed:  07:33


Pre-Op Dx


Secondary Cataract, Right Eye











MARILU MAGAÑA MD             Aug 28, 2020 07:46

## 2021-08-18 ENCOUNTER — HOSPITAL ENCOUNTER (OUTPATIENT)
Dept: HOSPITAL 75 - PREOP | Age: 81
Discharge: HOME | End: 2021-08-18
Attending: SURGERY
Payer: MEDICARE

## 2021-08-18 VITALS — HEIGHT: 67.99 IN | BODY MASS INDEX: 33.61 KG/M2 | WEIGHT: 221.79 LBS

## 2021-08-18 DIAGNOSIS — Z01.818: Primary | ICD-10-CM

## 2021-08-25 ENCOUNTER — HOSPITAL ENCOUNTER (OUTPATIENT)
Dept: HOSPITAL 75 - ENDO | Age: 81
Discharge: HOME | End: 2021-08-25
Attending: SURGERY
Payer: MEDICARE

## 2021-08-25 VITALS — SYSTOLIC BLOOD PRESSURE: 103 MMHG | DIASTOLIC BLOOD PRESSURE: 66 MMHG

## 2021-08-25 VITALS — BODY MASS INDEX: 33.61 KG/M2 | WEIGHT: 221.79 LBS | HEIGHT: 67.99 IN

## 2021-08-25 VITALS — SYSTOLIC BLOOD PRESSURE: 111 MMHG | DIASTOLIC BLOOD PRESSURE: 66 MMHG

## 2021-08-25 VITALS — DIASTOLIC BLOOD PRESSURE: 68 MMHG | SYSTOLIC BLOOD PRESSURE: 115 MMHG

## 2021-08-25 VITALS — DIASTOLIC BLOOD PRESSURE: 72 MMHG | SYSTOLIC BLOOD PRESSURE: 127 MMHG

## 2021-08-25 VITALS — DIASTOLIC BLOOD PRESSURE: 66 MMHG | SYSTOLIC BLOOD PRESSURE: 111 MMHG

## 2021-08-25 DIAGNOSIS — K64.4: ICD-10-CM

## 2021-08-25 DIAGNOSIS — D12.5: ICD-10-CM

## 2021-08-25 DIAGNOSIS — K64.1: ICD-10-CM

## 2021-08-25 DIAGNOSIS — Z12.11: Primary | ICD-10-CM

## 2021-08-25 DIAGNOSIS — Z79.899: ICD-10-CM

## 2021-08-25 DIAGNOSIS — K57.30: ICD-10-CM

## 2021-08-25 DIAGNOSIS — Z87.891: ICD-10-CM

## 2021-08-25 NOTE — DISCHARGE INST-SURGICAL
D/C Lap Instructions-REBEKAH


Follow Up





Activity as tolerated








High Fiber Diet 25g or more per day





Avoid Alcohol, Caffeine, Spicy Greasy and Acid foods.





Drink 64 fluid oz or more of fluids per day.





Symptoms to Report: Fever over 101 degree F, Nausea/Vomiting 


If any problems/questions: Contact your physician or go to Emergency Room











BRENDA WELCH MD                Aug 25, 2021 11:45

## 2021-08-25 NOTE — OPERATIVE REPORT
DATE OF SERVICE:  08/25/2021



ATTENDING PRIMARY CARE PHYSICIAN:

Verna Cortes MD in [place], [place].



PREOPERATIVE DIAGNOSIS:

Screening colonoscopy.



POSTOPERATIVE DIAGNOSES:

Mild chronic stage II external and internal hemorrhoids, mild sigmoid

diverticulosis, small hyperplastic polyp of the sigmoid colon.



PROCEDURE:

Colonoscopy with polypectomy with destruction using a biopsy forceps and

electrocautery.



SURGEON:

Brenda Welch MD.



ANESTHESIA:

Monitored anesthesia care.



ESTIMATED BLOOD LOSS:

Minimal.



FINDINGS:

Mild chronic stage II external and internal hemorrhoids, mild sigmoid

diverticulosis, small hyperplastic polyp of the sigmoid colon.



DISPOSITION:

The patient tolerated the procedure well.



INDICATIONS:

The patient is a 80-year-old male known to us.  We had seen him 10/2018 for

symptomatic ascites, likely secondary to liver cirrhosis.  At that time, he had

been drinking approximately half a liter of red wine daily for several years. 

He was referred over to us in need of a colonoscopy.  His last colonoscopy was

approximately 10 years ago and believes this to be normal.  He does not report

any major issues with diarrhea nor constipation as well as no red blood per

rectum nor any dark tarry stools.  He also does not report any family history of

colon cancer.



DESCRIPTION OF PROCEDURE:

The patient was brought to the endoscopy suite, laid in left lateral decubitus

position.  After adequate IV pain and sedative medications and monitored

anesthesia care, a digital rectal examination was performed.  Mild chronic stage

II external and internal hemorrhoids were identified, which were not actively

edematous nor inflamed and no bleeding.  Normal sphincter tone was felt and

there were no palpable masses.  Prostate gland was palpable and appeared normal.



The endoscope was then intubated to the anus and rectum gently insufflated.  The

endoscope was then advanced through the valves of Duffy of the rectum with no

polyps or any neoplasms identified.  Through the sigmoid colon, a very mild

sigmoid diverticulosis identified.  There was also a very small hyperplastic

polyp approximately 2 mm in size, which was biopsied and destroyed using forceps

and electrocautery.  The endoscope was then advanced to the remainder of the

descending, transverse, ascending colon and cecum, which were normal.  There

were no polyps or any neoplasms.  There were no other lesions identified.  The

endoscope was then slowly withdrawn while taking a second look and suctioning of

residual air with no additional findings.



The patient tolerated the procedure well.  We will recommend a high fiber diet

with at least 30 grams of fiber daily as well as significant amounts of water to

promote soft stools on a daily basis.  If he is asymptomatic, he does not need

another colonoscopy for another 10 years.





Job ID: [id number]

DocumentID: [id number]

Dictated Date:  08/25/2021 13:03:21

Transcription Date: 08/25/2021 17:22:16

Dictated By: BRENDA WELCH MD

## 2021-08-25 NOTE — PROGRESS NOTE-PRE OPERATIVE
Pre-Operative Progress Note


H&P Reviewed


The H&P was reviewed, patient examined and no changes noted.


Date Seen by Provider:  Aug 25, 2021


Time Seen by Provider:  11:30


Date H&P Reviewed:  Aug 25, 2021


Time H&P Reviewed:  11:30


Pre-Operative Diagnosis:  screening BRENDA Best MD                Aug 25, 2021 11:44

## 2021-08-25 NOTE — ANESTHESIA-GENERAL POST-OP
MAC


Patient Condition


Mental Status/LOC:  Same as Preop


Cardiovascular:  Satisfactory


Nausea/Vomiting:  Absent


Respiratory:  Satisfactory


Pain:  Controlled


Complications:  Absent





Post Op Complications


Complications


None





Follow Up Care/Instructions


Patient Instructions


None needed.





Anesthesiology Discharge Order


Discharge Order


Patient is doing well, no complaints, stable vital signs, no apparent adverse 

anesthesia problems.   


No complications reported per nursing.











UMANG VIGIL CRNA            Aug 25, 2021 13:10

## 2022-01-14 ENCOUNTER — HOSPITAL ENCOUNTER (OUTPATIENT)
Dept: HOSPITAL 75 - RAD | Age: 82
End: 2022-01-14
Attending: NURSE PRACTITIONER
Payer: MEDICARE

## 2022-01-14 DIAGNOSIS — K80.20: Primary | ICD-10-CM

## 2022-01-14 PROCEDURE — 76700 US EXAM ABDOM COMPLETE: CPT

## 2022-01-14 NOTE — DIAGNOSTIC IMAGING REPORT
INDICATION: Ascites.



PROCEDURE: Ultrasound abdomen complete.



TECHNIQUE: Multiple real-time grayscale images were obtained of

the abdomen in various projections.



Overall quality of the study is somewhat limited due to overlying

bowel gas. Liver is normal in size at 16.5 cm. No discrete liver

mass is detected. Portal vein is patent and shows normal

direction of flow. Gallbladder contains numerous stones.

Gallbladder wall thickness is borderline at approximately 3 mm.

No pericholecystic fluid or biliary ductal dilatation is

identified. The majority of the pancreas is obscured by bowel

gas. The spleen is normal in size at 10.2 cm. The spleen contains

multiple calcified granulomas. Aorta is nonaneurysmal. IVC is

patent. Both the right and left kidneys show normal cortical

thickness and echogenicity. No calculi or hydronephrosis is seen.

There is no ascites.



IMPRESSION:

1. Cholelithiasis with borderline gallbladder wall thickening. If

there is concern for acute cholecystitis, HIDA scan may be useful

for further evaluation. No other significant abnormality is seen.



Dictated by: 



  Dictated on workstation # [id number]

## 2022-03-29 ENCOUNTER — HOSPITAL ENCOUNTER (OUTPATIENT)
Dept: HOSPITAL 75 - CARD | Age: 82
End: 2022-03-29
Attending: INTERNAL MEDICINE
Payer: MEDICARE

## 2022-03-29 DIAGNOSIS — I08.3: Primary | ICD-10-CM

## 2022-03-29 PROCEDURE — 93306 TTE W/DOPPLER COMPLETE: CPT

## 2022-04-05 ENCOUNTER — HOSPITAL ENCOUNTER (OUTPATIENT)
Dept: HOSPITAL 75 - PREOP | Age: 82
End: 2022-04-05
Attending: SURGERY
Payer: MEDICARE

## 2022-04-05 VITALS — BODY MASS INDEX: 32.33 KG/M2 | WEIGHT: 218.26 LBS | HEIGHT: 69.02 IN

## 2022-04-05 DIAGNOSIS — Z01.818: Primary | ICD-10-CM

## 2022-04-07 ENCOUNTER — HOSPITAL ENCOUNTER (OUTPATIENT)
Dept: HOSPITAL 75 - SDC | Age: 82
End: 2022-04-07
Attending: SURGERY
Payer: MEDICARE

## 2022-04-07 VITALS — BODY MASS INDEX: 32.33 KG/M2 | WEIGHT: 218.26 LBS | HEIGHT: 69.02 IN

## 2022-04-07 VITALS — SYSTOLIC BLOOD PRESSURE: 90 MMHG | DIASTOLIC BLOOD PRESSURE: 55 MMHG

## 2022-04-07 VITALS — DIASTOLIC BLOOD PRESSURE: 87 MMHG | SYSTOLIC BLOOD PRESSURE: 101 MMHG

## 2022-04-07 VITALS — DIASTOLIC BLOOD PRESSURE: 53 MMHG | SYSTOLIC BLOOD PRESSURE: 92 MMHG

## 2022-04-07 VITALS — SYSTOLIC BLOOD PRESSURE: 95 MMHG | DIASTOLIC BLOOD PRESSURE: 49 MMHG

## 2022-04-07 VITALS — DIASTOLIC BLOOD PRESSURE: 81 MMHG | SYSTOLIC BLOOD PRESSURE: 118 MMHG

## 2022-04-07 VITALS — SYSTOLIC BLOOD PRESSURE: 110 MMHG | DIASTOLIC BLOOD PRESSURE: 59 MMHG

## 2022-04-07 VITALS — DIASTOLIC BLOOD PRESSURE: 75 MMHG | SYSTOLIC BLOOD PRESSURE: 116 MMHG

## 2022-04-07 VITALS — DIASTOLIC BLOOD PRESSURE: 49 MMHG | SYSTOLIC BLOOD PRESSURE: 95 MMHG

## 2022-04-07 VITALS — SYSTOLIC BLOOD PRESSURE: 102 MMHG | DIASTOLIC BLOOD PRESSURE: 55 MMHG

## 2022-04-07 VITALS — DIASTOLIC BLOOD PRESSURE: 55 MMHG | SYSTOLIC BLOOD PRESSURE: 102 MMHG

## 2022-04-07 DIAGNOSIS — R18.8: ICD-10-CM

## 2022-04-07 DIAGNOSIS — K80.10: Primary | ICD-10-CM

## 2022-04-07 DIAGNOSIS — K82.8: ICD-10-CM

## 2022-04-07 DIAGNOSIS — K74.69: ICD-10-CM

## 2022-04-07 PROCEDURE — 87081 CULTURE SCREEN ONLY: CPT

## 2022-04-07 RX ADMIN — SODIUM CHLORIDE, SODIUM LACTATE, POTASSIUM CHLORIDE, AND CALCIUM CHLORIDE PRN MLS/HR: 600; 310; 30; 20 INJECTION, SOLUTION INTRAVENOUS at 10:05

## 2022-04-07 RX ADMIN — SODIUM CHLORIDE, SODIUM LACTATE, POTASSIUM CHLORIDE, AND CALCIUM CHLORIDE PRN MLS/HR: 600; 310; 30; 20 INJECTION, SOLUTION INTRAVENOUS at 13:46

## 2022-04-07 NOTE — DISCHARGE INST-SURGICAL
D/C Lap Instructions-KIDO


Reconcile Patient Problems


Problems Reviewed?:  Yes


New, Converted, or Re-Newed RX:  RX on Chart


Follow Up Appt in 2 weeks





Activity as tolerated


No driving for 24 hours


No driving while on pain medications





Incentive Spirometry use every 2 hours while awake





Regular Diet





Symptoms to Report: Fever over 101 degree F, Nausea/Vomiting 


Infection Signs and Symptoms to report:  Increased redness, Foul odor of wound, 

Increased drainage





Bathing instructions: May shower


Operative Area Clean/Dry;  Keep incision clean/dry





If any problems/questions: Contact your physician or go to Emergency Room











ANDREA HUITRON           Apr 7, 2022 09:50

## 2022-04-07 NOTE — ANESTHESIA-GENERAL POST-OP
General


Patient Condition


Mental Status/LOC:  Same as Preop


Cardiovascular:  Satisfactory


Nausea/Vomiting:  Absent


Respiratory:  Satisfactory


Pain:  Controlled


Complications:  Absent





Post Op Complications


Complications


None





Follow Up Care/Instructions


Patient Instructions


None needed.





Anesthesia/Patient Condition


Patient Condition


Patient is doing well, no complaints, stable vital signs, no apparent adverse 

anesthesia problems.   


No complications reported per nursing.











UMANG VIGIL CRNA             Apr 7, 2022 14:06

## 2022-04-07 NOTE — OPERATIVE REPORT
DATE OF SERVICE:  04/07/2022



ATTENDING PRIMARY CARE PHYSICIAN:

Dr. Verna Cortes at the clinic in [place], [place].



PREOPERATIVE DIAGNOSES:

Symptomatic ascites, liver cirrhosis, cholelithiasis, and hyperbilirubinemia.



POSTOPERATIVE DIAGNOSES:

Ascites, liver cirrhosis, chronic calculous cholelithiasis, 6 liters of ascites

was evacuated.



PROCEDURES PERFORMED:

Diagnostic laparoscopy, evacuation of ascites, and laparoscopic cholecystectomy.



SURGEON:

Brenda Welch MD.



ASSISTANT:

SYED Harmon.



ANESTHESIA:

General endotracheal.



ESTIMATED BLOOD LOSS:

Minimal.



FINDINGS:

Ascites, liver cirrhosis, chronic calculous cholelithiasis, 6 liters of ascites

was evacuated.



DISPOSITION:

The patient tolerated the procedure well.



INDICATIONS FOR PROCEDURE:

The patient is a 81-year-old male known to us.  We had done an EGD and

colonoscopy on him in 2019.  Even at that time, he did show signs and symptoms

of liver cirrhosis.  He was found to have hyperbilirubinemia with a bilirubin at

5.2.  He also has some abdominal discomfort in the right upper abdominal

quadrant as well as abdominal distention, especially in the past four to five

months.  He did have a CT scan done recently, which did show cholelithiasis as

well as a gallbladder wall thickening.



DESCRIPTION OF PROCEDURE:

The patient was brought to the operating room and laid supine on the table. 

After adequate IV pain and sedative medications and general endotracheal

intubation, the abdomen was prepped and draped in a standard surgical fashion.



A 0.5% Marcaine with epinephrine was used to anesthetize the overlying skin in

the left upper abdominal quadrant and transverse skin incision made using 15

blade.  A 0 silk suture was applied to the medial aspect incision for retraction

and a Veress needle inserted with a low opening pressure of 0 mmHg and the

abdomen was insufflated to 15 mmHg pressure.  The Veress needle removed and a 5

mm XL trocar was placed followed by a 5 mm 45-degree angle laparoscope

visualizing the peritoneal cavity.



A four-quadrant abdominal exploration was performed.  There was significant

amount of ascites throughout the peritoneal cavity identified in the right

subphrenic space as well as the pelvis.  The patient also did have nodular liver

cirrhosis.  There was mild gallbladder wall thickening.  We then proceeded with

an aspiration of the ascites in a systematic fashion and approximately 6 liters

was evacuated.  Under direct visualization, we then proceeded to place a 10 mm

supraumbilical port after the skin and peritoneal lining were anesthetized using

0.5% Marcaine with epinephrine and a transverse skin incision was made using a

15 blade.  In a similar manner, a right upper abdominal quadrant 5 mm port was

placed.



The patient was then placed in a reverse Trendelenburg position as well as plane

right side up, left side down.  The fundus of the gallbladder was then retracted

anteriorly and superiorly.  The hepatoduodenal ligament was then dissected

bluntly as well as using electrocautery on hook instrument as well as a [place]

dissector.  The entire critical view of safety was identified including the

triangle of Calot as well as the cystic duct and artery as the only two

structures going into the gallbladder as well as the cystic plate behind the

proximal gallbladder.  A timeout was then taken and the cystic duct and artery

were then clipped proximally and distally and cut with EndoShears.  The

gallbladder was then dissected off the liver bed using a cautery with

visualization of good hemostasis as well as no leaking ducts of Luschka.  The

gallbladder was removed through the 10 mm port site using an EndoCatch bag.



The 10 mm port site fascia and peritoneum were then closed under direct

visualization using a Kael-Missael device and 0 Vicryl suture.  The abdomen

was then desufflated and the remaining ports were removed.  All skin incisions

were closed using 4-0 Monocryl running subcuticular sutures.  Wounds were then

cleaned and covered with Dermabond.



The patient tolerated the procedure well.  We will start IV normal pain

medication as well as a clear liquid diet.  Once he is tolerating clears, has

good pain control with oral pain medications, and ambulating well, we will

discharge him home where he will be instructed to do no heavy lifting or

exertion for the next two weeks.





Job ID: [id number]

DocumentID: [id number]

Dictated Date:  04/07/2022 14:08:32

Transcription Date: 04/07/2022 16:47:47

Dictated By: BRENDA WELCH MD

## 2022-04-07 NOTE — PROGRESS NOTE-POST OPERATIVE
Post-Operative Progess Note


Surgeon (s)/Assistant (s)


Surgeon


BRENDA WELCH MD


Assistant:  brendan singh APRN





Pre-Operative Diagnosis


Chronic calculous cholecystitis





Post-Operative Diagnosis





ascites, liver cirrhosis, chronic calculous cholecystitis.





Procedure & Operative Findings


Date of Procedure


4/7/22


Procedure Performed/Findings


dx laparoscopy, laparoscopic cholecystectomy, evacuation ascites.


Anesthesia Type


get





Estimated Blood Loss


Estimated blood loss (mL):  minimal





Specimens/Packing


Specimens Removed


gallbladder











BRENDA WELCH MD                 Apr 7, 2022 14:04

## 2022-04-07 NOTE — PROGRESS NOTE-PRE OPERATIVE
Pre-Operative Progress Note


H&P Reviewed


The H&P was reviewed, patient examined and no changes noted.


Date Seen by Provider:  Apr 7, 2022


Time Seen by Provider:  09:50


Date H&P Reviewed:  Apr 7, 2022


Time H&P Reviewed:  09:45


Pre-Operative Diagnosis:  Chronic calculous cholecystitis











ANDREA HUITRON APRN           Apr 7, 2022 09:51

## 2022-04-13 ENCOUNTER — HOSPITAL ENCOUNTER (EMERGENCY)
Dept: HOSPITAL 75 - ER | Age: 82
Discharge: HOME | End: 2022-04-13
Payer: MEDICARE

## 2022-04-13 VITALS — BODY MASS INDEX: 32.26 KG/M2 | WEIGHT: 217.82 LBS | HEIGHT: 68.9 IN

## 2022-04-13 VITALS — DIASTOLIC BLOOD PRESSURE: 73 MMHG | SYSTOLIC BLOOD PRESSURE: 88 MMHG

## 2022-04-13 DIAGNOSIS — K72.90: ICD-10-CM

## 2022-04-13 DIAGNOSIS — R18.8: ICD-10-CM

## 2022-04-13 DIAGNOSIS — Z48.03: ICD-10-CM

## 2022-04-13 DIAGNOSIS — E88.09: ICD-10-CM

## 2022-04-13 DIAGNOSIS — E87.5: ICD-10-CM

## 2022-04-13 DIAGNOSIS — I95.1: Primary | ICD-10-CM

## 2022-04-13 DIAGNOSIS — Z90.49: ICD-10-CM

## 2022-04-13 LAB
ALBUMIN SERPL-MCNC: 2.1 GM/DL (ref 3.2–4.5)
ALP SERPL-CCNC: 126 U/L (ref 40–136)
ALT SERPL-CCNC: 50 U/L (ref 0–55)
APTT PPP: (no result) S
BACTERIA #/AREA URNS HPF: NEGATIVE /HPF
BASOPHILS # BLD AUTO: 0 10^3/UL (ref 0–0.1)
BASOPHILS NFR BLD AUTO: 0 % (ref 0–10)
BILIRUB SERPL-MCNC: 4.3 MG/DL (ref 0.1–1)
BILIRUB UR QL STRIP: (no result)
BLD SMEAR INTERP: YES
BUN/CREAT SERPL: 21
CALCIUM SERPL-MCNC: 7.8 MG/DL (ref 8.5–10.1)
CHLORIDE SERPL-SCNC: 106 MMOL/L (ref 98–107)
CO2 SERPL-SCNC: 18 MMOL/L (ref 21–32)
CREAT SERPL-MCNC: 0.91 MG/DL (ref 0.6–1.3)
EOSINOPHIL # BLD AUTO: 0.1 10^3/UL (ref 0–0.3)
EOSINOPHIL NFR BLD AUTO: 1 % (ref 0–10)
FIBRINOGEN PPP-MCNC: CLEAR MG/DL
GFR SERPLBLD BASED ON 1.73 SQ M-ARVRAT: 86 ML/MIN
GLUCOSE SERPL-MCNC: 128 MG/DL (ref 70–105)
GLUCOSE UR STRIP-MCNC: NEGATIVE MG/DL
HCT VFR BLD CALC: 36 % (ref 40–54)
HGB BLD-MCNC: 12.8 G/DL (ref 13.3–17.7)
HYALINE CASTS #/AREA URNS LPF: (no result) /LPF
KETONES UR QL STRIP: (no result)
LEUKOCYTE ESTERASE UR QL STRIP: NEGATIVE
LYMPHOCYTES # BLD AUTO: 1.6 10^3/UL (ref 1–4)
LYMPHOCYTES NFR BLD AUTO: 15 % (ref 12–44)
MAGNESIUM SERPL-MCNC: 1.9 MG/DL (ref 1.6–2.4)
MANUAL DIFFERENTIAL PERFORMED BLD QL: NO
MCH RBC QN AUTO: 42 PG (ref 25–34)
MCHC RBC AUTO-ENTMCNC: 35 G/DL (ref 32–36)
MCV RBC AUTO: 118 FL (ref 80–99)
MONOCYTES # BLD AUTO: 1.6 10^3/UL (ref 0–1)
MONOCYTES NFR BLD AUTO: 15 % (ref 0–12)
NEUTROPHILS # BLD AUTO: 7.4 10^3/UL (ref 1.8–7.8)
NEUTROPHILS NFR BLD AUTO: 68 % (ref 42–75)
NITRITE UR QL STRIP: POSITIVE
PH UR STRIP: 5.5 [PH] (ref 5–9)
PLATELET # BLD: 115 10^3/UL (ref 130–400)
PMV BLD AUTO: 11.8 FL (ref 9–12.2)
POTASSIUM SERPL-SCNC: 5.5 MMOL/L (ref 3.6–5)
PROT SERPL-MCNC: 5.9 GM/DL (ref 6.4–8.2)
PROT UR QL STRIP: (no result)
RBC #/AREA URNS HPF: (no result) /HPF
SODIUM SERPL-SCNC: 135 MMOL/L (ref 135–145)
SP GR UR STRIP: >=1.03 (ref 1.02–1.02)
SQUAMOUS #/AREA URNS HPF: (no result) /HPF
WBC # BLD AUTO: 10.9 10^3/UL (ref 4.3–11)
WBC #/AREA URNS HPF: (no result) /HPF

## 2022-04-13 PROCEDURE — 83735 ASSAY OF MAGNESIUM: CPT

## 2022-04-13 PROCEDURE — 36415 COLL VENOUS BLD VENIPUNCTURE: CPT

## 2022-04-13 PROCEDURE — 87077 CULTURE AEROBIC IDENTIFY: CPT

## 2022-04-13 PROCEDURE — 85025 COMPLETE CBC W/AUTO DIFF WBC: CPT

## 2022-04-13 PROCEDURE — 12011 RPR F/E/E/N/L/M 2.5 CM/<: CPT

## 2022-04-13 PROCEDURE — 80053 COMPREHEN METABOLIC PANEL: CPT

## 2022-04-13 PROCEDURE — 87088 URINE BACTERIA CULTURE: CPT

## 2022-04-13 PROCEDURE — 81000 URINALYSIS NONAUTO W/SCOPE: CPT

## 2022-04-13 NOTE — ED INTEGUMENTARY GENERAL
General


Chief Complaint:  Skin/Wound Problems


Stated Complaint:  POST OP LEAKING BELLY BUTTON


Source:  patient, family


Exam Limitations:  no limitations


 (BRITTANY STEIN MED STUDENT)





History of Present Illness


Date Seen by Provider:  Apr 13, 2022


Time Seen by Provider:  09:46


Initial Comments


Mr. Zuleta is a 82 yo male with PMH cholecystectomy and paracentesis on 4/7, and

ascites, that presents to ED today due to leaking from surgical incision and 

some new unsteadiness on his feet. Pt was having a bowel movement this morning 

when he notices fluid was shooting out of the incision. The fluid looks serous 

but family members are concerned about the amount of fluid and that it is 

soiling his clothes. They are also concerned about some possible redness around 

the surgical incision sites. Pt states he is not having pain. Family is also con

cerned about a new unsteadiness he has while walking. They state he is a quite 

heavy drinker and drinks wine all day. He has had some wine this morning. 


 (BRITTANY STEIN MED STUDENT)





Allergies and Home Medications


Allergies


Coded Allergies:  


     No Known Drug Allergies (Unverified , 4/7/22)





Patient Home Medication List


Home Medication List Reviewed:  Yes


 (AINSLEY CASTILLO MD)


Glutathione (Glutathione-l) 5 Gm Powder, 5 GM MC DAILY, (Reported)


   Entered as Reported by: JUDE WINKLER on 8/14/20 1347


Hydrocodone/Acetaminophen (Hydrocodone-Acetamin 7.5-325) 1 Each Tablet, 1 EACH 

PO Q4H PRN for PAIN-BREAKTHROUGH


   Prescribed by: ANDREA HUITRON on 4/7/22 0950


Metoprolol Succinate (Metoprolol Succinate) 25 Mg Tab.er.24h, 25 MG PO DAILY, 

(Reported)


   Entered as Reported by: MILLY LEWIS on 10/2/18 1416


Milk Thistle (Milk Thistle) 175 Mg Tablet, 375 MG PO DAILY, (Reported)


   Entered as Reported by: JUDE WINKLER on 8/14/20 1347


Spironolactone (Aldactone) 50 Mg Tablet, 50 MG PO DAILY


   Prescribed by: KEDAR SANFORD on 10/3/18 1153


Vitamin B Complex (Vitamin B Complex) 1 Each Capsule, 1 EACH PO DAILY, 

(Reported)


   Entered as Reported by: JUDE WINKLER on 8/14/20 1347





Review of Systems


Review of Systems


Constitutional:  No chills, No fever


EENTM:  No hearing loss, No vision loss


Respiratory:  No cough, No short of breath


Cardiovascular:  No chest pain, No palpitations


Gastrointestinal:  No abdominal pain, No constipation, No diarrhea, No nausea, 

No vomiting


Genitourinary:  No dysuria, No hematuria


Musculoskeletal:  No back pain, No joint pain


Skin:  other (increased redness around surgical incisions, draining from lateral

edge of umbilical uncision)


Psychiatric/Neurological:  Denies Headache, Denies Numbness (BRITTANY STEIN MED 

STUDENT)





Past Medical-Social-Family Hx


Patient Social History


Tobacco Use?:  No


Substance use?:  No


Alcohol Use?:  Yes


Alcohol type:  Wine


Alcohol Frequency:  Daily


Pt feels they are or have been:  No


 (BRITTANY STEIN [email or web address] YASHIRA)





Immunizations Up To Date


Influenza Vaccine Up-to-Date:  Yes; Up-to-Date


First/Initial COVID19 Vaccinat:  6/2021


Second COVID19 Vaccination Cl:  7/2021


Third COVID19 Vaccination Date:  6/2021


COVID19 Vaccine :  moderna


 (BRITTANY STEIN MED YASHIRA)





Seasonal Allergies


Seasonal Allergies:  No


 (BRITTANY STEIN MED YASHIRA)





Past Medical History


Surgery/Hospitalization HX:  


cholecystectomy-4/12/22 had 3 l fluid removed at same time





hx of ascites, cirrhosis of the liver, and htn


Surgeries:  No (LT KNEE ACL REPAIR, PILONDIAL CYSTECTOMY)


Orthopedic, Tonsillectomy


Respiratory:  No


Cardiac:  Yes (TACHYCARDIA)


Hypertension


Neurological:  No


Genitourinary:  No


Gastrointestinal:  Yes (LIVER DISEASE WITH ASCITES)


Liver Disease/Jaundice


Musculoskeletal:  Yes


Back Injury, Chronic Back Pain


Endocrine:  No


HEENT:  Yes (WEARS GLASSES)


Loss of Vision:  Denies


Hearing Impairment:  Denies


Cancer:  No


Psychosocial:  No


Integumentary:  No


Blood Disorders:  No


Adverse Reaction/Blood Tranf:  No


 (BRITTANY STEIN STUDENT)





Physical Exam


Vital Signs





Vital Signs - First Documented








 4/13/22





 09:50


 


Temp 35.3


 


Pulse 85


 


Resp 18


 


B/P (MAP) 103/82 (89)


 


Pulse Ox 99





 (AINSLEY CASTILLO MD)


Vital Signs


Capillary Refill :  


 (BRITTANY STEIN MED STUDENT)


General Appearance:  WD/WN, no apparent distress


HEENT:  PERRL/EOMI, pharynx normal


Cardiovascular:  regular rate, rhythm, no edema, no murmur


Respiratory:  chest non-tender, lungs clear, normal breath sounds


Gastrointestinal:  normal bowel sounds, non tender, soft, other (Surgical 

incisions from cholecytectomy with normal post-operative bruising. There is some

leakage form the edge of the umbilical incision, with pressure on the abdomen 

some serous fluid can be expressed. Minimal tenderness. )


Back:  no vertebral tenderness, other (There is some bruising on the back on the

R side that is painless to palpation. )


Extremities:  non-tender, no pedal edema, no calf tenderness


Neurologic/Psychiatric:  alert, normal mood/affect, oriented x 3


Skin:  normal color, warm/dry (BRITTANY STEIN MED STUDENT)





Procedures/Interventions





Other Wound Location


Umbilical incision


Progress


The area to the left of the umbilical incision was cleaned with alcohol and 

anesthetized with approximately 1 mL lidocaine.  The area was prepped with 

Betadine.  A figure-of-eight suture was placed over the left lateral end of the 

incision.  This suture was placed by Brittany Stein, MS 4 under my supervision.  

There was still some oozing of serous or ascites fluid after placing the first 

suture.  A second figure-of-eight suture was placed overlapping the first and 

just medial to the first by this provider.  This seemed to stop the oozing.  

Nursing staff placed a dressing over the incision and sutures.


 (AINSLEY CASTILLO MD)





Progress/Results/Core Measures


Results/Orders


Lab Results





Laboratory Tests








Test


 4/13/22


10:29 Range/Units


 


 


White Blood Count


 10.9 


 4.3-11.0


10^3/uL


 


Red Blood Count


 3.07 L


 4.30-5.52


10^6/uL


 


Hemoglobin 12.8 L 13.3-17.7  g/dL


 


Hematocrit 36 L 40-54  %


 


Mean Corpuscular Volume 118 H 80-99  fL


 


Mean Corpuscular Hemoglobin 42 H 25-34  pg


 


Mean Corpuscular Hemoglobin


Concent 35 


 32-36  g/dL





 


Red Cell Distribution Width 13.3  10.0-14.5  %


 


Platelet Count


 115 L


 130-400


10^3/uL


 


Mean Platelet Volume 11.8  9.0-12.2  fL


 


Immature Granulocyte % (Auto) 1   %


 


Neutrophils (%) (Auto) 68  42-75  %


 


Lymphocytes (%) (Auto) 15  12-44  %


 


Monocytes (%) (Auto) 15 H 0-12  %


 


Eosinophils (%) (Auto) 1  0-10  %


 


Basophils (%) (Auto) 0  0-10  %


 


Neutrophils # (Auto)


 7.4 


 1.8-7.8


10^3/uL


 


Lymphocytes # (Auto)


 1.6 


 1.0-4.0


10^3/uL


 


Monocytes # (Auto)


 1.6 H


 0.0-1.0


10^3/uL


 


Eosinophils # (Auto)


 0.1 


 0.0-0.3


10^3/uL


 


Basophils # (Auto)


 0.0 


 0.0-0.1


10^3/uL


 


Immature Granulocyte # (Auto)


 0.1 


 0.0-0.1


10^3/uL


 


Percent Immature Platelet


Fraction 6.7 


 0.0-7.6  %





 


Sodium Level 135  135-145  MMOL/L


 


Potassium Level 5.5 H 3.6-5.0  MMOL/L


 


Chloride Level 106    MMOL/L


 


Carbon Dioxide Level 18 L 21-32  MMOL/L


 


Anion Gap 11  5-14  MMOL/L


 


Blood Urea Nitrogen 19 H 7-18  MG/DL


 


Creatinine


 0.91 


 0.60-1.30


MG/DL


 


Estimat Glomerular Filtration


Rate 86 


  





 


BUN/Creatinine Ratio 21   


 


Glucose Level 128 H   MG/DL


 


Calcium Level 7.8 L 8.5-10.1  MG/DL


 


Corrected Calcium 9.3  8.5-10.1  MG/DL


 


Magnesium Level 1.9  1.6-2.4  MG/DL


 


Total Bilirubin 4.3 H 0.1-1.0  MG/DL


 


Aspartate Amino Transf


(AST/SGOT) 101 H


 5-34  U/L





 


Alanine Aminotransferase


(ALT/SGPT) 50 


 0-55  U/L





 


Alkaline Phosphatase 126    U/L


 


Total Protein 5.9 L 6.4-8.2  GM/DL


 


Albumin 2.1 L 3.2-4.5  GM/DL


 


Smear Scan YES   





 (AINSLEY CASTILLO MD)


My Orders





Orders - AINSLEY CASTILLO MD


Cbc With Automated Diff (4/13/22 10:20)


Comprehensive Metabolic Panel (4/13/22 10:20)


Magnesium (4/13/22 10:20)


Ua Culture If Indicated (4/13/22 10:20)


Ed Iv/Invasive Line Start (4/13/22 10:20)


Lidocaine 1% Inj 50 Ml (Xylocaine 1% Inj (4/13/22 10:45)


Ns Iv 1000 Ml (Sodium Chloride 0.9%) (4/13/22 11:15)


Ns Iv 500 Ml (Sodium Chloride 0.9%) (4/13/22 12:30)


Ns Iv 500 Ml (Sodium Chloride 0.9%) (4/13/22 12:28)


 (AINSLEY CASTILLO MD)


Medications Given in ED





Current Medications








 Medications  Dose


 Ordered  Sig/Bobby


 Route  Start Time


 Stop Time Status Last Admin


Dose Admin


 


 Lidocaine HCl  50 ml  ONCE  ONCE


 IJ  4/13/22 10:45


 4/13/22 10:46 DC 4/13/22 11:00


50 ML


 


 Sodium Chloride  500 ml @ 0


 mls/hr  Q0M ONCE


 IV  4/13/22 12:30


 4/13/22 12:31 DC 4/13/22 12:35


0 MLS/HR





 (AINSLEY CASTILLO MD)


Vital Signs/I&O











 4/13/22





 09:50


 


Temp 35.3


 


Pulse 85


 


Resp 18


 


B/P (MAP) 103/82 (89)


 


Pulse Ox 99





 (AINSLEY CASTILLO MD)





Progress


Progress Note #1:  


   Time:  11:29


Progress Note


Patient was found to have orthostatic hypotension with a standing blood pressure

of 74/49.  A liter of IV normal saline has been ordered.  We will recheck a 

standing blood pressure after this liter bolus infuses.  I discussed the leaking

incision with Dr. Hastings.  He recommended closing either with glue or by applying 

a deep figure 8 suture over the incision.  A figure-of-eight suture was placed 

over the left lateral aspect of the incision by MS 4.  There was still some 

leaking that shifted more medially.  A second finger a suture was placed by this

provider in a more medial position, overlapping the first.  This seemed to stop 

the oozing.  Patient was found to be mildly hyperkalemic.  Hyperkalemia was also

treated with the normal saline bolus.


Progress Note #2:  


   Time:  13:42


Progress Note


Patient received a total of 1500 mL normal saline.  His systolic blood pressure 

was 84 and he was feeling improved.  He was able to stand and take a few steps 

on his own power, and he stated he felt much steadier on his feet.  

Unfortunately, when he stood up he began leaking again.  At this point I do not 

think there is much I can do to resolve his ascites leak.  I updated Dr. Hastings 

who concurred.  We are discharging him with instructions to apply bulky dr

owen to collect to the drainage.  Patient states he feels safe to return home

and is anxious to leave.  I do not believe there would be any benefit in 

providing further IV fluid boluses as he will likely promptly third space those 

fluids given his hypoalbuminemia.  We did discuss the importance of proper 

alcohol tapering and cessation to curb progressive effects of liver failure.  He

expressed understanding but did not express any intent to stop drinking alcohol.


 (AINSLEY CASTILLO MD)





Departure


Impression





   Primary Impression:  


   Orthostatic hypotension


   Additional Impressions:  


   Drainage from surgical wound


   Abdominal ascites


   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


   Liver failure


   Qualified Codes:  K72.10 - Chronic hepatic failure without coma


   Hypoalbuminemia


   Hyperkalemia


Disposition:  01 HOME, SELF-CARE


Condition:  Improved





Departure-Patient Inst.


Referrals:  


NO,LOCAL PHYSICIAN (PCP/Family)


Primary Care Physician


Patient Instructions:  Cirrhosis, Fluid in the Belly (Ascites)





Add. Discharge Instructions:  


Drink plenty of clear liquids to stay well-hydrated and eat a well-balanced 

diet.


You should have your sutures removed in 5 to 7 days.  This can be done at your 

convenience in the ER or you can make arrangements with Dr. Hastings's office to 

have them removed.


If the incision continues to leak, please contact Dr. Hastings's office for further 

instructions.  While leaking, you may place a clean gauze over the linking wound

to collect the drainage.


It is recommended that you quit drinking alcohol completely to avoid worsening 

liver failure.  If you quit drinking, please gradually taper the quantity 

consumed each day.  Abruptly stopping alcohol consumption without tapering it 

may cause severe withdrawal symptoms, seizures, and death.  It is best to stop 

drinking alcohol under the careful guidance of your primary care provider or an 

addiction treatment specialist.


If you are still taking spironolactone, please stop it for a few days.


Return to care if you have worsening symptoms.





All discharge instructions reviewed with patient and/or family. Voiced 

understanding.





Medical Student Attestation and Attending Note:





I have personally interviewed and examined this patient along with Brittany Stein, MS 4. I have reviewed student documentation including history, 

physical, and assessments.  I agree with the documentation except where 

otherwise noted.





Exam:


General: Alert, oriented, no acute distress, well developed


HEENT: Normocephalic and atraumatic


Heart: Regular rate and rhythm without murmur


Lungs: Clear to auscultation bilaterally with normal effort


Abdomen: Soft, minimal tenderness as expected for postoperative day 6, no 

tenderness to percussion or peritoneal signs, ecchymosis surrounding abdominal 

incisions, serous drainage from the lateral aspect of the umbilical incision, 

mildly distended, normal bowel sounds


Neuropsych: Alert, oriented, no focal deficits


Skin: Warm and dry without rashes, ecchymosis around incisions


 (AINSLEY CASTILLO MD)





Copy


Copies To 1:   BRENDA HASTINGS MD, DEREK MED STUDENT      Apr 13, 2022 10:05


AINSLEY CASTILLO MD        Apr 13, 2022 11:35

## 2022-04-23 ENCOUNTER — HOSPITAL ENCOUNTER (INPATIENT)
Dept: HOSPITAL 75 - ER | Age: 82
LOS: 12 days | Discharge: HOSPICE | DRG: 871 | End: 2022-05-05
Attending: INTERNAL MEDICINE | Admitting: FAMILY MEDICINE
Payer: MEDICARE

## 2022-04-23 VITALS — BODY MASS INDEX: 34.28 KG/M2 | HEIGHT: 67.72 IN | WEIGHT: 223.55 LBS

## 2022-04-23 DIAGNOSIS — K72.10: ICD-10-CM

## 2022-04-23 DIAGNOSIS — I10: ICD-10-CM

## 2022-04-23 DIAGNOSIS — E77.8: ICD-10-CM

## 2022-04-23 DIAGNOSIS — N17.9: ICD-10-CM

## 2022-04-23 DIAGNOSIS — K74.60: ICD-10-CM

## 2022-04-23 DIAGNOSIS — F10.20: ICD-10-CM

## 2022-04-23 DIAGNOSIS — Z51.5: ICD-10-CM
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
## 2022-04-23 DIAGNOSIS — R18.8: ICD-10-CM

## 2022-04-23 DIAGNOSIS — Z66: ICD-10-CM

## 2022-04-23 DIAGNOSIS — E80.6: ICD-10-CM

## 2022-04-23 DIAGNOSIS — K65.2: ICD-10-CM

## 2022-04-23 DIAGNOSIS — E87.2: ICD-10-CM

## 2022-04-23 DIAGNOSIS — R74.01: ICD-10-CM

## 2022-04-23 DIAGNOSIS — K76.7: ICD-10-CM

## 2022-04-23 DIAGNOSIS — E72.20: ICD-10-CM

## 2022-04-23 DIAGNOSIS — A41.81: Primary | ICD-10-CM

## 2022-04-23 DIAGNOSIS — K72.00: ICD-10-CM

## 2022-04-23 DIAGNOSIS — K92.1: ICD-10-CM

## 2022-04-23 DIAGNOSIS — R65.21: ICD-10-CM

## 2022-04-23 DIAGNOSIS — R60.1: ICD-10-CM

## 2022-04-23 DIAGNOSIS — Z87.891: ICD-10-CM

## 2022-04-23 DIAGNOSIS — D65: ICD-10-CM

## 2022-04-23 LAB
ALBUMIN SERPL-MCNC: 2.4 GM/DL (ref 3.2–4.5)
ALBUMIN SERPL-MCNC: 2.6 GM/DL (ref 3.2–4.5)
ALP SERPL-CCNC: 109 U/L (ref 40–136)
ALP SERPL-CCNC: 119 U/L (ref 40–136)
ALT SERPL-CCNC: 92 U/L (ref 0–55)
ALT SERPL-CCNC: 98 U/L (ref 0–55)
APTT BLD: 32 SEC (ref 24–35)
APTT PPP: (no result) S
ARTERIAL PATENCY WRIST A: (no result)
BACTERIA #/AREA URNS HPF: (no result) /HPF
BASE EXCESS STD BLDA CALC-SCNC: -9.3 MMOL/L (ref -2.5–2.5)
BASOPHILS # BLD AUTO: 0 10^3/UL (ref 0–0.1)
BASOPHILS NFR BLD AUTO: 0 % (ref 0–10)
BDY SITE: (no result)
BILIRUB SERPL-MCNC: 2.9 MG/DL (ref 0.1–1)
BILIRUB SERPL-MCNC: 3.1 MG/DL (ref 0.1–1)
BILIRUB UR QL STRIP: NEGATIVE
BODY TEMPERATURE: 36.5
BUN/CREAT SERPL: 29
BUN/CREAT SERPL: 30
CALCIUM SERPL-MCNC: 7.9 MG/DL (ref 8.5–10.1)
CALCIUM SERPL-MCNC: 8.6 MG/DL (ref 8.5–10.1)
CHLORIDE SERPL-SCNC: 102 MMOL/L (ref 98–107)
CHLORIDE SERPL-SCNC: 97 MMOL/L (ref 98–107)
CO2 BLDA CALC-SCNC: 14.8 MMOL/L (ref 21–31)
CO2 SERPL-SCNC: 12 MMOL/L (ref 21–32)
CO2 SERPL-SCNC: 14 MMOL/L (ref 21–32)
CREAT SERPL-MCNC: 2.22 MG/DL (ref 0.6–1.3)
CREAT SERPL-MCNC: 2.48 MG/DL (ref 0.6–1.3)
EOSINOPHIL # BLD AUTO: 0 10^3/UL (ref 0–0.3)
EOSINOPHIL NFR BLD AUTO: 0 % (ref 0–10)
FIBRINOGEN PPP-MCNC: CLEAR MG/DL
GFR SERPLBLD BASED ON 1.73 SQ M-ARVRAT: 26 ML/MIN
GFR SERPLBLD BASED ON 1.73 SQ M-ARVRAT: 29 ML/MIN
GLUCOSE SERPL-MCNC: 147 MG/DL (ref 70–105)
GLUCOSE SERPL-MCNC: 179 MG/DL (ref 70–105)
GLUCOSE UR STRIP-MCNC: NEGATIVE MG/DL
HCT VFR BLD CALC: 36 % (ref 40–54)
HGB BLD-MCNC: 13.3 G/DL (ref 13.3–17.7)
INHALED O2 FLOW RATE: (no result) L/MIN
INR PPP: 1.4 (ref 0.8–1.4)
KETONES UR QL STRIP: NEGATIVE
LEUKOCYTE ESTERASE UR QL STRIP: NEGATIVE
LYMPHOCYTES # BLD AUTO: 1.2 10^3/UL (ref 1–4)
LYMPHOCYTES NFR BLD AUTO: 6 % (ref 12–44)
MACROCYTES BLD QL SMEAR: (no result)
MANUAL DIFFERENTIAL PERFORMED BLD QL: YES
MCH RBC QN AUTO: 42 PG (ref 25–34)
MCHC RBC AUTO-ENTMCNC: 37 G/DL (ref 32–36)
MCV RBC AUTO: 114 FL (ref 80–99)
MONOCYTES # BLD AUTO: 1.9 10^3/UL (ref 0–1)
MONOCYTES NFR BLD AUTO: 9 % (ref 0–12)
MONOCYTES NFR BLD: 8 %
NEUTROPHILS # BLD AUTO: 16.9 10^3/UL (ref 1.8–7.8)
NEUTROPHILS NFR BLD AUTO: 84 % (ref 42–75)
NEUTS BAND NFR BLD MANUAL: 87 %
NITRITE UR QL STRIP: NEGATIVE
PCO2 BLDA: 21 MMHG (ref 35–45)
PH BLDA: 7.45 [PH] (ref 7.37–7.43)
PH UR STRIP: 5.5 [PH] (ref 5–9)
PLATELET # BLD: 191 10^3/UL (ref 130–400)
PMV BLD AUTO: 11 FL (ref 9–12.2)
PO2 BLDA: 75 MMHG (ref 79–93)
POTASSIUM SERPL-SCNC: 4.6 MMOL/L (ref 3.6–5)
POTASSIUM SERPL-SCNC: 4.6 MMOL/L (ref 3.6–5)
PROT SERPL-MCNC: 6 GM/DL (ref 6.4–8.2)
PROT SERPL-MCNC: 6.6 GM/DL (ref 6.4–8.2)
PROT UR QL STRIP: NEGATIVE
PROTHROMBIN TIME: 18 SEC (ref 12.2–14.7)
RBC #/AREA URNS HPF: (no result) /HPF
SAO2 % BLDA FROM PO2: 96 % (ref 94–100)
SODIUM SERPL-SCNC: 126 MMOL/L (ref 135–145)
SODIUM SERPL-SCNC: 129 MMOL/L (ref 135–145)
SP GR UR STRIP: >=1.03 (ref 1.02–1.02)
SQUAMOUS #/AREA URNS HPF: (no result) /HPF
TOXIC GRANULES BLD QL SMEAR: (no result)
URATE CRY #/AREA URNS LPF: (no result) /LPF
VARIANT LYMPHS NFR BLD MANUAL: 5 %
VENTILATION MODE VENT: NO
WBC # BLD AUTO: 20.2 10^3/UL (ref 4.3–11)
WBC #/AREA URNS HPF: (no result) /HPF

## 2022-04-23 PROCEDURE — 82805 BLOOD GASES W/O2 SATURATION: CPT

## 2022-04-23 PROCEDURE — 36415 COLL VENOUS BLD VENIPUNCTURE: CPT

## 2022-04-23 PROCEDURE — 84100 ASSAY OF PHOSPHORUS: CPT

## 2022-04-23 PROCEDURE — 87070 CULTURE OTHR SPECIMN AEROBIC: CPT

## 2022-04-23 PROCEDURE — 85384 FIBRINOGEN ACTIVITY: CPT

## 2022-04-23 PROCEDURE — 85007 BL SMEAR W/DIFF WBC COUNT: CPT

## 2022-04-23 PROCEDURE — 83735 ASSAY OF MAGNESIUM: CPT

## 2022-04-23 PROCEDURE — 86141 C-REACTIVE PROTEIN HS: CPT

## 2022-04-23 PROCEDURE — 83605 ASSAY OF LACTIC ACID: CPT

## 2022-04-23 PROCEDURE — 85610 PROTHROMBIN TIME: CPT

## 2022-04-23 PROCEDURE — 74176 CT ABD & PELVIS W/O CONTRAST: CPT

## 2022-04-23 PROCEDURE — 71260 CT THORAX DX C+: CPT

## 2022-04-23 PROCEDURE — 82947 ASSAY GLUCOSE BLOOD QUANT: CPT

## 2022-04-23 PROCEDURE — 96375 TX/PRO/DX INJ NEW DRUG ADDON: CPT

## 2022-04-23 PROCEDURE — 96366 THER/PROPH/DIAG IV INF ADDON: CPT

## 2022-04-23 PROCEDURE — 87205 SMEAR GRAM STAIN: CPT

## 2022-04-23 PROCEDURE — 81000 URINALYSIS NONAUTO W/SCOPE: CPT

## 2022-04-23 PROCEDURE — 85730 THROMBOPLASTIN TIME PARTIAL: CPT

## 2022-04-23 PROCEDURE — 74178 CT ABD&PLV WO CNTR FLWD CNTR: CPT

## 2022-04-23 PROCEDURE — 76937 US GUIDE VASCULAR ACCESS: CPT

## 2022-04-23 PROCEDURE — 71045 X-RAY EXAM CHEST 1 VIEW: CPT

## 2022-04-23 PROCEDURE — 36569 INSJ PICC 5 YR+ W/O IMAGING: CPT

## 2022-04-23 PROCEDURE — 80053 COMPREHEN METABOLIC PANEL: CPT

## 2022-04-23 PROCEDURE — 87081 CULTURE SCREEN ONLY: CPT

## 2022-04-23 PROCEDURE — 82140 ASSAY OF AMMONIA: CPT

## 2022-04-23 PROCEDURE — 87040 BLOOD CULTURE FOR BACTERIA: CPT

## 2022-04-23 PROCEDURE — 85379 FIBRIN DEGRADATION QUANT: CPT

## 2022-04-23 PROCEDURE — 80320 DRUG SCREEN QUANTALCOHOLS: CPT

## 2022-04-23 PROCEDURE — 96365 THER/PROPH/DIAG IV INF INIT: CPT

## 2022-04-23 PROCEDURE — 85027 COMPLETE CBC AUTOMATED: CPT

## 2022-04-23 PROCEDURE — 93005 ELECTROCARDIOGRAM TRACING: CPT

## 2022-04-23 PROCEDURE — 76942 ECHO GUIDE FOR BIOPSY: CPT

## 2022-04-23 PROCEDURE — 85025 COMPLETE CBC W/AUTO DIFF WBC: CPT

## 2022-04-23 PROCEDURE — 96361 HYDRATE IV INFUSION ADD-ON: CPT

## 2022-04-23 RX ADMIN — CIPROFLOXACIN SCH MLS/HR: 2 INJECTION, SOLUTION INTRAVENOUS at 22:10

## 2022-04-23 RX ADMIN — METRONIDAZOLE SCH MLS/HR: 5 INJECTION, SOLUTION INTRAVENOUS at 22:06

## 2022-04-23 RX ADMIN — SODIUM CHLORIDE SCH MLS/HR: 900 INJECTION, SOLUTION INTRAVENOUS at 21:30

## 2022-04-23 RX ADMIN — SODIUM CHLORIDE, SODIUM LACTATE, POTASSIUM CHLORIDE, CALCIUM CHLORIDE, AND DEXTROSE MONOHYDRATE SCH MLS/HR: 600; 310; 30; 20; 5 INJECTION, SOLUTION INTRAVENOUS at 21:36

## 2022-04-23 NOTE — DIAGNOSTIC IMAGING REPORT
PROCEDURE: CT abdomen and pelvis without contrast.



TECHNIQUE: Multiple contiguous axial images were obtained through

the abdomen and pelvis without the use of intravenous contrast.

Auto Exposure Controls were utilized during the CT exam to meet

ALARA standards for radiation dose reduction. 



DATE: April 23, 2022.



COMPARISON: Ultrasound abdomen complete January 14, 2022. CT

abdomen and pelvis August 24, 2018. 



INDICATION: 81-year-old male, nausea. Leak at incision site

status post cholecystectomy 2-1/2 weeks ago.



FINDINGS: There are limitations for evaluation of the abdominal

organs, neoplastic processes, abscess, and limited evaluation of

the vasculature relating to the lack of intravenous contrast. 



There is respiratory motion artifact. The heart is not enlarged.

There is no pericardial effusion.



The liver contours are mildly nodular suggesting possible

cirrhosis. Recommend correlation with liver function test. The

gallbladder is surgically absent. There is no intrahepatic or

extrahepatic bile duct dilation. The main pancreatic duct is not

abnormally dilated. Limited noncontrast assessment of the

pancreatic parenchyma is unremarkable. Splenic calcifications

likely relate to prior granulomatous disease. The spleen is not

enlarged. The adrenal glands are unremarkable.



Unremarkable appearance of the renal parenchyma. The urinary

collecting systems are not distended. There is no identified

renal or ureteral stone. The urinary bladder is unremarkable.



There is mild diverticulosis without evidence to suggest acute

diverticulitis. The intestinal tract is not distended. There is

no free intraperitoneal air. There is a small volume ascites.

There is no identified drainable fluid collection.



There are atherosclerotic calcifications. There is no identified

abnormally enlarged lymph node in the abdomen or pelvis meeting

CT size criteria for adenopathy.



There are multilevel degenerative changes of the spine. There is

no identified acute bony abnormality.



IMPRESSION: 

CT abdomen and pelvis: 

1. Findings questionable for cirrhosis with small volume ascites.

Recommend correlation with liver function test. No splenomegaly.

2. No identified acute abnormality in the abdomen or pelvis. 



Dictated by: 



  Dictated on workstation # [id number]

## 2022-04-23 NOTE — ED GENERAL
General


Chief Complaint:  Abdominal/GI Problems


Stated Complaint:  NAUSEA/CAN'T EAT/LOW BLOOD PRESSURE/DIZZINESS


Nursing Triage Note:  


PT PRESENTS TO ED VIA POV FROM HOME WITH COMPLAINTS OF NAUSEA, LOW BP, AND 


INCISION CONTINUING TO LEAK AFTER GALLBLADDER REMOVED 2 1/2 WEEKS AGO.





History of Present Illness


Date Seen by Provider:  2022


Time Seen by Provider:  14:52


Initial Comments


81-year-old  male presents for nausea, low blood pressure, continued 

drainage from gallbladder incisions, alcohol dependence (red wine), decreased 

appetite, and malaise. He was started on Bactrim after ED visit here, for UTI. 

Wound was closed with suture in ED, then removed by Andrea Huitron APRN on 

22. Denies fever at home. He had 1-2 glasses of wine today.


Timing/Duration:  Getting Worse


Severity:  Moderate


Associated Systoms:  No Chest Pain, No Fever/Chills; Loss of Appetite, Malaise, 

Nausea/Vomiting; No Seizure, No Shortness of Air, No Syncope; Weakness





Allergies and Home Medications


Allergies


Coded Allergies:  


     No Known Drug Allergies (Unverified , 22)





Patient Home Medication List


Home Medication List Reviewed:  Yes


Glutathione (Glutathione-l) 5 Gm Powder, 5 GM MC DAILY, (Reported)


   Entered as Reported by: JUDE WINKLER on 20 1347


Hydrocodone/Acetaminophen (Hydrocodone-Acetamin 7.5-325) 1 Each Tablet, 1 EACH 

PO Q4H PRN for PAIN-BREAKTHROUGH


   Prescribed by: ANDREA HUITRON on 22 0950


Metoprolol Succinate (Metoprolol Succinate) 25 Mg Tab.er.24h, 25 MG PO DAILY, 

(Reported)


   Entered as Reported by: MILLY LEWIS on 10/2/18 1416


Milk Thistle (Milk Thistle) 175 Mg Tablet, 375 MG PO DAILY, (Reported)


   Entered as Reported by: JUDE WINKLER on 20 1347


Spironolactone (Aldactone) 50 Mg Tablet, 50 MG PO DAILY


   Prescribed by: KEDAR SANFORD on 10/3/18 1153


Vitamin B Complex (Vitamin B Complex) 1 Each Capsule, 1 EACH PO DAILY, 

(Reported)


   Entered as Reported by: JUDE WINKLER on 20 1347





Review of Systems


Review of Systems


Constitutional:  see HPI; No fever; malaise, weakness


EENTM:  see HPI, no symptoms reported


Respiratory:  no symptoms reported, see HPI; No dyspnea on exertion


Cardiovascular:  no symptoms reported, see HPI; No chest pain


Gastrointestinal:  see HPI, abdominal pain, loss of appetite, nausea


Skin:  see HPI, other (Persistent drainage from laparoscopic cholecystectomy.)





All Other Systems Reviewed


Negative Unless Noted:  Yes





Past Medical-Social-Family Hx


Patient Social History


Tobacco Use?:  No


Substance use?:  No


Alcohol Use?:  Yes


Alcohol type:  Wine


Alcohol Frequency:  Daily


Pt feels they are or have been:  No





Immunizations Up To Date


First/Initial COVID19 Vaccinat:  2021


Second COVID19 Vaccination Cl:  2021


Third COVID19 Vaccination Date:  2021


COVID19 Vaccine :  MODERNA





Seasonal Allergies


Seasonal Allergies:  No





Past Medical History


Surgery/Hospitalization HX:  


cholecystectomy-22 had 3 l fluid removed at same time





hx of ascites, cirrhosis of the liver, and htn


Surgeries:  No (LT KNEE ACL REPAIR, PILONDIAL CYSTECTOMY)


Orthopedic, Tonsillectomy


Respiratory:  No


Cardiac:  Yes (TACHYCARDIA)


Hypertension


Neurological:  No


Genitourinary:  No


Gastrointestinal:  Yes (LIVER DISEASE WITH ASCITES)


Liver Disease/Jaundice


Musculoskeletal:  Yes


Back Injury, Chronic Back Pain


Endocrine:  No


HEENT:  Yes (WEARS GLASSES)


Loss of Vision:  Denies


Hearing Impairment:  Denies


Cancer:  No


Psychosocial:  No


Integumentary:  No


Blood Disorders:  No


Adverse Reaction/Blood Tranf:  No





Family Medical History


Reviewed Nursing Family Hx





Physical Exam


Vital Signs





Vital Signs - First Documented








 22





 14:48


 


Temp 36.3


 


Pulse 125


 


Resp 18


 


B/P (MAP) 94/61 (72)





Capillary Refill : Less Than 3 Seconds


Height, Weight, BMI


Height: 5'9.00"


Weight: 179lbs. 0.0oz. 81.035607wi; 34.00 BMI


Method:


General Appearance:  No Apparent Distress, WD/WN


HEENT:  PERRL/EOMI, TMs Normal, Normal ENT Inspection, Pharynx Normal


Neck:  Full Range of Motion, Normal Inspection, Non Tender, Supple


Respiratory:  Chest Non Tender, Lungs Clear, Normal Breath Sounds


Cardiovascular:  Regular Rate, Rhythm, No Edema, Tachycardia (105-120)


Gastrointestinal:  Normal Bowel Sounds, Soft, Distended; No Mass, No Rebound, No

Tenderness; Other (Incision sites without erythema or warmth, clear drainage 

noted from umbilical incision. culture obtained from umbilical drainage. )


Extremity:  Normal Capillary Refill, Normal Inspection, Normal Range of Motion


Neurologic/Psychiatric:  Alert, Oriented x3, No Motor/Sensory Deficits, Normal 

Mood/Affect





Focused Exam


Lactate Level


22 15:25: Lactic Acid Level 3.19*H





Lactic Acid Level





Laboratory Tests








Test


 22


15:25


 


Lactic Acid Level


 3.19 MMOL/L


(0.50-2.00)  *H











Progress/Results/Core Measures


Suspected Sepsis


SIRS


Temperature: 


Pulse: 125 


Respiratory Rate: 18


 


Laboratory Tests


22 14:45: White Blood Count 20.2H


Blood Pressure 94 /61 


Mean: 72


 


22 15:25: Lactic Acid Level 3.19*H


Laboratory Tests


22 14:45: 


Creatinine 2.48H, INR Comment 1.4, Platelet Count 191, Total Bilirubin 3.1H








Results/Orders


Lab Results





Laboratory Tests








Test


 22


14:45 22


15:25 22


16:13 Range/Units


 


 


White Blood Count


 20.2 H


 


 


 4.3-11.0


10^3/uL


 


Red Blood Count


 3.18 L


 


 


 4.30-5.52


10^6/uL


 


Hemoglobin 13.3    13.3-17.7  g/dL


 


Hematocrit 36 L   40-54  %


 


Mean Corpuscular Volume 114 H   80-99  fL


 


Mean Corpuscular Hemoglobin 42 H   25-34  pg


 


Mean Corpuscular Hemoglobin


Concent 37 H


 


 


 32-36  g/dL





 


Red Cell Distribution Width 12.4    10.0-14.5  %


 


Platelet Count


 191 


 


 


 130-400


10^3/uL


 


Mean Platelet Volume 11.0    9.0-12.2  fL


 


Immature Granulocyte % (Auto) 1     %


 


Neutrophils (%) (Auto) 84 H   42-75  %


 


Lymphocytes (%) (Auto) 6 L   12-44  %


 


Monocytes (%) (Auto) 9    0-12  %


 


Eosinophils (%) (Auto) 0    0-10  %


 


Basophils (%) (Auto) 0    0-10  %


 


Neutrophils # (Auto)


 16.9 H


 


 


 1.8-7.8


10^3/uL


 


Lymphocytes # (Auto)


 1.2 


 


 


 1.0-4.0


10^3/uL


 


Monocytes # (Auto)


 1.9 H


 


 


 0.0-1.0


10^3/uL


 


Eosinophils # (Auto)


 0.0 


 


 


 0.0-0.3


10^3/uL


 


Basophils # (Auto)


 0.0 


 


 


 0.0-0.1


10^3/uL


 


Immature Granulocyte # (Auto)


 0.2 H


 


 


 0.0-0.1


10^3/uL


 


Neutrophils % (Manual) 87     %


 


Lymphocytes % (Manual) 5     %


 


Monocytes % (Manual) 8     %


 


Toxic Granulation 1+     


 


Macrocytosis MODERATE     


 


Prothrombin Time 18.0 H   12.2-14.7  SEC


 


INR Comment 1.4    0.8-1.4  


 


Activated Partial


Thromboplast Time 32 


 


 


 24-35  SEC





 


Sodium Level 126 L   135-145  MMOL/L


 


Potassium Level 4.6    3.6-5.0  MMOL/L


 


Chloride Level 97 L     MMOL/L


 


Carbon Dioxide Level 12 L   21-32  MMOL/L


 


Anion Gap 17 H   5-14  MMOL/L


 


Blood Urea Nitrogen 73 H   7-18  MG/DL


 


Creatinine


 2.48 H


 


 


 0.60-1.30


MG/DL


 


Estimat Glomerular Filtration


Rate 26 


 


 


  





 


BUN/Creatinine Ratio 29     


 


Glucose Level 179 H     MG/DL


 


Calcium Level 8.6    8.5-10.1  MG/DL


 


Corrected Calcium 9.7    8.5-10.1  MG/DL


 


Total Bilirubin 3.1 H   0.1-1.0  MG/DL


 


Aspartate Amino Transf


(AST/SGOT) 133 H


 


 


 5-34  U/L





 


Alanine Aminotransferase


(ALT/SGPT) 98 H


 


 


 0-55  U/L





 


Alkaline Phosphatase 119      U/L


 


C-Reactive Protein High


Sensitivity 3.22 H


 


 


 0.00-0.50


MG/DL


 


Total Protein 6.6    6.4-8.2  GM/DL


 


Albumin 2.6 L   3.2-4.5  GM/DL


 


Serum Alcohol < 10    <10  MG/DL


 


Lactic Acid Level


 


 3.19 *H


 


 0.50-2.00


MMOL/L


 


Urine Color   ORANGE   


 


Urine Clarity   CLEAR   


 


Urine pH   5.5  5-9  


 


Urine Specific Gravity   >=1.030  1.016-1.022  


 


Urine Protein   NEGATIVE  NEGATIVE  


 


Urine Glucose (UA)   NEGATIVE  NEGATIVE  


 


Urine Ketones   NEGATIVE  NEGATIVE  


 


Urine Nitrite   NEGATIVE  NEGATIVE  


 


Urine Bilirubin   NEGATIVE  NEGATIVE  


 


Urine Urobilinogen   0.2  < = 1.0  MG/DL


 


Urine Leukocyte Esterase   NEGATIVE  NEGATIVE  


 


Urine RBC (Auto)   NEGATIVE  NEGATIVE  


 


Urine RBC   NONE   /HPF


 


Urine WBC   NONE   /HPF


 


Urine Squamous Epithelial


Cells 


 


 NONE 


  /HPF





 


Urine Crystals   PRESENT H  /LPF


 


Urine Uric Acid Crystals   MODERATE H  /LPF


 


Urine Bacteria   TRACE   /HPF


 


Urine Casts   NONE   /LPF


 


Urine Mucus   NEGATIVE   /LPF


 


Urine Culture Indicated   NO   








My Orders





Orders - ALFONSOHAYDEE LUCA


Cbc With Automated Diff (22 14:44)


Comprehensive Metabolic Panel (22 14:44)


Ua Culture If Indicated (22 14:44)


Ed Iv/Invasive Line Start (22 14:49)


Ns Iv 1000 Ml (Sodium Chloride 0.9%) (22 15:00)


Ondansetron Injection (Zofran Injectio (22 14:49)


Manual Differential (22 14:45)


Alcohol (22 15:07)


Blood Culture (22 15:14)


Lactic Acid Analyzer (22 15:14)


Chest 1 View, Ap/Pa Only (22 15:53)


Ed Iv/Invasive Line Start (22 16:08)


Ns Iv 1000 Ml (Sodium Chloride 0.9%) (22 16:15)


Vancomycin Injection (Vancomycin Injecti (22 16:15)


Hs C Reactive Protein (22 16:23)


Vancomycin Injection (Vancomycin Injecti (22 16:45)


Protime With Inr (22 17:00)


Partial Thromboplastin Time (22 17:00)


Ct Abdomen/Pelvis Wo (22 17:06)





Vital Signs/I&O











 22





 14:48


 


Temp 36.3


 


Pulse 125


 


Resp 18


 


B/P (MAP) 94/61 (72)





Capillary Refill : Less Than 3 Seconds








Blood Pressure Mean:                    72








Progress Note :  


   Time:  14:52


Progress Note


Patient seen and evaluated, will obtain labs, per sepsis protocol based on 

hypotension and tachycardia. NS  He is afebrile. Sister and wife at bedside 


1530 B/P 100/68. No complaints. 


1600 WBC 20.2; Awaiting UA. CXR neg. 


1615 Lactic 3.19, will give second liter NS. 


1645 B/P improved to 112/70, HR . No fever. UA obtained. Based on previous

Urine culture, will give Vanc 1 gm IV. CT abdomen and pelvis to assess ascites 

and possible perotonitis? Dr. Sotomayor agreeable with plan to admit. 


170 spoke to Dr. Lucero, agreeable to see patient this weekend, requested call 

to Dr. Hastings, since surgery < 1month ago. Spoke to Dr. Hastings, recommended Cipro 

and Flagyl IV. Will see 22. Spoke to his wife and sister by phone, updated 

on status and plan. 


1830 lactic 2.89. Receiving 3rd liter of NS. HR 80s-110. B/P 106/74. Patient 

denies any complaints. Awaiting ICU bed placement. 


 ICU shift change, then will accept patient. He was up to bathroom and drank

coffee. B/P remains labile. HR . No complaints, no D/Ts but did ask about 

having wine.





Diagnostic Imaging





   Diagonstic Imaging:  Xray


   Plain Films/CT/US/NM/MRI:  chest


Comments


NAME:   MARCELINOINDIRA COCHRAN


MED REC#:   [id number]


ACCOUNT#:   [id number]


PT STATUS:   REG ER


:   1940


PHYSICIAN:   HAYDEE HAMILTON


ADMIT DATE:   22/ER


                                  ***Signed***


Date of Exam:22





CHEST 1 VIEW, AP/PA ONLY








INDICATION: Sepsis.





TIME OF EXAM: 4:06 PM.





COMPARISON: No prior studies are available for comparison.





FINDING: The heart size is normal. The pulmonary vascularity is


unremarkable. The lungs are clear. No infiltrate, effusion or


pneumothorax is detected.





IMPRESSION: No acute cardiopulmonary process is detected.





Dictated by: 





  Dictated on workstation # [id number]








Dict:   22 1621


Trans:   22 1625


[facility] [id number]





Interpreted by:     RIKA MILLER MD


Electronically signed by: RIKA MILLER MD 22








   Diagonstic Imaging:  CT


   Plain Films/CT/US/NM/MRI:  abdomen, pelvis


Comments


NAME:   MARCELINOINDIRA Kiromic


MED REC#:   [id number]


ACCOUNT#:   [id number]


PT STATUS:   REG ER


:   1940


PHYSICIAN:   HAYDEE HAMILTON


ADMIT DATE:   22/ER


                                  ***Signed***


Date of Exam:22





CT ABDOMEN/PELVIS WO








PROCEDURE: CT abdomen and pelvis without contrast.





TECHNIQUE: Multiple contiguous axial images were obtained through


the abdomen and pelvis without the use of intravenous contrast.


Auto Exposure Controls were utilized during the CT exam to meet


ALARA standards for radiation dose reduction. 





DATE: 2022.





COMPARISON: Ultrasound abdomen complete 2022. CT


abdomen and pelvis 2018. 





INDICATION: 81-year-old male, nausea. Leak at incision site


status post cholecystectomy 2-1/2 weeks ago.





FINDINGS: There are limitations for evaluation of the abdominal


organs, neoplastic processes, abscess, and limited evaluation of


the vasculature relating to the lack of intravenous contrast. 





There is respiratory motion artifact. The heart is not enlarged.


There is no pericardial effusion.





The liver contours are mildly nodular suggesting possible


cirrhosis. Recommend correlation with liver function test. The


gallbladder is surgically absent. There is no intrahepatic or


extrahepatic bile duct dilation. The main pancreatic duct is not


abnormally dilated. Limited noncontrast assessment of the


pancreatic parenchyma is unremarkable. Splenic calcifications


likely relate to prior granulomatous disease. The spleen is not


enlarged. The adrenal glands are unremarkable.





Unremarkable appearance of the renal parenchyma. The urinary


collecting systems are not distended. There is no identified


renal or ureteral stone. The urinary bladder is unremarkable.





There is mild diverticulosis without evidence to suggest acute


diverticulitis. The intestinal tract is not distended. There is


no free intraperitoneal air. There is a small volume ascites.


There is no identified drainable fluid collection.





There are atherosclerotic calcifications. There is no identified


abnormally enlarged lymph node in the abdomen or pelvis meeting


CT size criteria for adenopathy.





There are multilevel degenerative changes of the spine. There is


no identified acute bony abnormality.





IMPRESSION: 


CT abdomen and pelvis: 


1. Findings questionable for cirrhosis with small volume ascites.


Recommend correlation with liver function test. No splenomegaly.


2. No identified acute abnormality in the abdomen or pelvis.


   Reviewed:  Reviewed by Me





Departure


Impression





   Primary Impression:  


   Cirrhosis


   Qualified Codes:  K74.60 - Unspecified cirrhosis of liver


   Additional Impressions:  


   EtOH dependence


   Qualified Codes:  F10.20 - Alcohol dependence, uncomplicated


   Sepsis


   Qualified Codes:  A41.9 - Sepsis, unspecified organism


Disposition:   ADMITTED AS INPATIENT


Condition:  Critical





Admissions


Decision to Admit/Date:  2022


Time/Decision to Admit Time:  16:20





Departure-Patient Inst.


Referrals:  


NO,LOCAL PHYSICIAN (PCP/Family)


Primary Care Physician











HAYDEE HAMILTON                  2022 16:03

## 2022-04-23 NOTE — DIAGNOSTIC IMAGING REPORT
INDICATION: Sepsis.



TIME OF EXAM: 4:06 PM.



COMPARISON: No prior studies are available for comparison.



FINDING: The heart size is normal. The pulmonary vascularity is

unremarkable. The lungs are clear. No infiltrate, effusion or

pneumothorax is detected.



IMPRESSION: No acute cardiopulmonary process is detected.



Dictated by: 



  Dictated on workstation # [id number]

## 2022-04-23 NOTE — TELE-ICU CONSULT
History of Present Illness


History of Present Illness


Date Seen by Provider:  Apr 23, 2022


Time Seen by Provider:  19:25


Date of Admission


ED COURSE: 81-year-old  male presents for nausea, low blood pressure, 

continued drainage from gallbladder incisions, alcohol dependence (red wine), 

decreased appetite, and malaise. He was started on Bactrim after ED visit here, 

for UTI. Wound was closed with suture in ED, then removed by Andrea Huitron APRN

on 4/20/22. Denies fever at home. He had 1-2 glasses of wine today.


ICU course: pt appears stable/ has drainage from the incision/ clear to yellow.


no abd pain; no fever





Allergies and Home Medications


Allergies


Coded Allergies:  


     No Known Drug Allergies (Unverified , 4/7/22)





Home Medications


Glutathione 5 Gm Powder, 5 GM MC DAILY, (Reported)


Hydrocodone/Acetaminophen 1 Each Tablet, 1 EACH PO Q4H PRN for PAIN-BREAKTHROUGH


   Prescribed by: ANRDEA HUITRON on 4/7/22 0950


Metoprolol Succinate 25 Mg Tab.er.24h, 25 MG PO DAILY, (Reported)


Milk Thistle 175 Mg Tablet, 375 MG PO DAILY, (Reported)


Spironolactone 50 Mg Tablet, 50 MG PO DAILY


   Prescribed by: KEDAR SANFORD on 10/3/18 1153


Vitamin B Complex 1 Each Capsule, 1 EACH PO DAILY, (Reported)





Past Medical/Social/Family Hx


Patient Social History


Tobacco Use?:  No


Substance use?:  No


Alcohol Use?:  Yes


Alcohol type:  Wine


Alcohol Frequency:  Daily


Pt stated abuse/neglect:  No





Immunizations Up To Date


First/Initial COVID19 Vaccinat:  6/2021


Second COVID19 Vaccination Cl:  7/2021





Current Status


Primary Language:  English


Preferred Spoken Language:  English





Past Medical History





cirrhosis





Review of Systems


Constitutional:  see HPI





Focused Exam


Lactate Level


4/23/22 15:25: Lactic Acid Level 3.19*H


4/23/22 18:09: Lactic Acid Level 2.89*H


Height, Weight, BMI


Height: 5'9.00"


Weight: 179lbs. 0.0oz. 81.302812wi; 34.00 BMI


Method:


Lactic Acid Level





Laboratory Tests








Test


 4/23/22


18:09


 


Lactic Acid Level


 2.89 MMOL/L


(0.50-2.00)  *H











Exam


Exam


Patient acknowledged, consented, and participated in this virtual visit which 

was conducted using real time audio/video


Vital Signs








  Date Time  Temp Pulse Resp B/P (MAP) Pulse Ox O2 Delivery O2 Flow Rate FiO2


 


4/23/22 14:48 36.3 125 18 94/61 (72)    








Height & Weight


Height: 5'9.00"


Weight: 179lbs. 0.0oz. 81.223057nq; 34.00 BMI


Method:


General Appearance:  No Apparent Distress, WD/WN


HEENT:  PERRL/EOMI, TMs Normal, Normal ENT Inspection, Pharynx Normal


Neck:  Full Range of Motion, Normal Inspection, Non Tender, Supple


Respiratory:  Chest Non Tender, Lungs Clear, Normal Breath Sounds


Cardiovascular:  Regular Rate, Rhythm, No Edema, Tachycardia (105-120)


Capillary Refill:  Less Than 3 Seconds


Extremity:  Normal Capillary Refill, Normal Inspection, Normal Range of Motion


Neurologic/Psychiatric:  Alert, Oriented x3, No Motor/Sensory Deficits, Normal 

Mood/Affect





Results


Lab


Laboratory Tests


4/23/22 14:45











Assessment/Plan


Assessment/Plan


Sepsis due to possible bacteremia/ SBP/ SSI


-trend lactic acid


follow blood cultures


-add G- coverage zosyn


-severe metabolic acidosis: abg/ repeat cmp


dvt prophylxis











CHAD RAMOS MD      Apr 23, 2022 19:27

## 2022-04-24 LAB
ALBUMIN SERPL-MCNC: 2.6 GM/DL (ref 3.2–4.5)
ALP SERPL-CCNC: 93 U/L (ref 40–136)
ALT SERPL-CCNC: 82 U/L (ref 0–55)
BASOPHILS # BLD AUTO: 0 10^3/UL (ref 0–0.1)
BASOPHILS NFR BLD AUTO: 0 % (ref 0–10)
BILIRUB SERPL-MCNC: 2.9 MG/DL (ref 0.1–1)
BUN/CREAT SERPL: 31
CALCIUM SERPL-MCNC: 8 MG/DL (ref 8.5–10.1)
CHLORIDE SERPL-SCNC: 104 MMOL/L (ref 98–107)
CO2 SERPL-SCNC: 13 MMOL/L (ref 21–32)
CREAT SERPL-MCNC: 1.79 MG/DL (ref 0.6–1.3)
EOSINOPHIL # BLD AUTO: 0 10^3/UL (ref 0–0.3)
EOSINOPHIL NFR BLD AUTO: 0 % (ref 0–10)
GFR SERPLBLD BASED ON 1.73 SQ M-ARVRAT: 38 ML/MIN
GLUCOSE SERPL-MCNC: 139 MG/DL (ref 70–105)
HCT VFR BLD CALC: 30 % (ref 40–54)
HGB BLD-MCNC: 10.8 G/DL (ref 13.3–17.7)
LYMPHOCYTES # BLD AUTO: 1.2 10^3/UL (ref 1–4)
LYMPHOCYTES NFR BLD AUTO: 10 % (ref 12–44)
MAGNESIUM SERPL-MCNC: 2.1 MG/DL (ref 1.6–2.4)
MANUAL DIFFERENTIAL PERFORMED BLD QL: NO
MCH RBC QN AUTO: 42 PG (ref 25–34)
MCHC RBC AUTO-ENTMCNC: 36 G/DL (ref 32–36)
MCV RBC AUTO: 117 FL (ref 80–99)
MONOCYTES # BLD AUTO: 1.6 10^3/UL (ref 0–1)
MONOCYTES NFR BLD AUTO: 13 % (ref 0–12)
NEUTROPHILS # BLD AUTO: 9.4 10^3/UL (ref 1.8–7.8)
NEUTROPHILS NFR BLD AUTO: 76 % (ref 42–75)
PHOSPHATE SERPL-MCNC: 3.1 MG/DL (ref 2.3–4.7)
PLATELET # BLD: 142 10^3/UL (ref 130–400)
PMV BLD AUTO: 10.9 FL (ref 9–12.2)
POTASSIUM SERPL-SCNC: 4.4 MMOL/L (ref 3.6–5)
PROT SERPL-MCNC: 5.6 GM/DL (ref 6.4–8.2)
SODIUM SERPL-SCNC: 133 MMOL/L (ref 135–145)
WBC # BLD AUTO: 12.3 10^3/UL (ref 4.3–11)

## 2022-04-24 RX ADMIN — Medication SCH MLS/HR: at 11:41

## 2022-04-24 RX ADMIN — SODIUM CHLORIDE SCH MLS/HR: 900 INJECTION, SOLUTION INTRAVENOUS at 07:38

## 2022-04-24 RX ADMIN — Medication SCH MLS/HR: at 08:13

## 2022-04-24 RX ADMIN — SODIUM CHLORIDE SCH MLS/HR: 900 INJECTION, SOLUTION INTRAVENOUS at 04:45

## 2022-04-24 RX ADMIN — ASCORBIC ACID, VITAMIN A PALMITATE, CHOLECALCIFEROL, THIAMINE HYDROCHLORIDE, RIBOFLAVIN-5 PHOSPHATE SODIUM, PYRIDOXINE HYDROCHLORIDE, NIACINAMIDE, DEXPANTHENOL, ALPHA-TOCOPHEROL ACETATE, VITAMIN K1, FOLIC ACID, BIOTIN, CYANOCOBALAMIN SCH MLS/HR: 200; 3300; 200; 6; 3.6; 6; 40; 15; 10; 150; 600; 60; 5 INJECTION, SOLUTION INTRAVENOUS at 08:18

## 2022-04-24 RX ADMIN — CIPROFLOXACIN SCH MLS/HR: 2 INJECTION, SOLUTION INTRAVENOUS at 21:09

## 2022-04-24 RX ADMIN — SODIUM CHLORIDE, SODIUM LACTATE, POTASSIUM CHLORIDE, CALCIUM CHLORIDE, AND DEXTROSE MONOHYDRATE SCH MLS/HR: 600; 310; 30; 20; 5 INJECTION, SOLUTION INTRAVENOUS at 14:34

## 2022-04-24 RX ADMIN — SODIUM CHLORIDE SCH MLS/HR: 900 INJECTION, SOLUTION INTRAVENOUS at 14:34

## 2022-04-24 RX ADMIN — SODIUM CHLORIDE, SODIUM LACTATE, POTASSIUM CHLORIDE, CALCIUM CHLORIDE, AND DEXTROSE MONOHYDRATE SCH MLS/HR: 600; 310; 30; 20; 5 INJECTION, SOLUTION INTRAVENOUS at 21:50

## 2022-04-24 RX ADMIN — CIPROFLOXACIN SCH MLS/HR: 2 INJECTION, SOLUTION INTRAVENOUS at 08:34

## 2022-04-24 RX ADMIN — METRONIDAZOLE SCH MLS/HR: 5 INJECTION, SOLUTION INTRAVENOUS at 21:17

## 2022-04-24 RX ADMIN — SODIUM CHLORIDE, SODIUM LACTATE, POTASSIUM CHLORIDE, CALCIUM CHLORIDE, AND DEXTROSE MONOHYDRATE SCH MLS/HR: 600; 310; 30; 20; 5 INJECTION, SOLUTION INTRAVENOUS at 04:53

## 2022-04-24 RX ADMIN — SODIUM CHLORIDE, SODIUM LACTATE, POTASSIUM CHLORIDE, CALCIUM CHLORIDE, AND DEXTROSE MONOHYDRATE SCH MLS/HR: 600; 310; 30; 20; 5 INJECTION, SOLUTION INTRAVENOUS at 11:41

## 2022-04-24 RX ADMIN — SODIUM CHLORIDE SCH MLS/HR: 900 INJECTION, SOLUTION INTRAVENOUS at 21:33

## 2022-04-24 RX ADMIN — LORAZEPAM PRN MG: 0.5 TABLET ORAL at 15:18

## 2022-04-24 RX ADMIN — METRONIDAZOLE SCH MLS/HR: 5 INJECTION, SOLUTION INTRAVENOUS at 08:34

## 2022-04-24 RX ADMIN — Medication SCH MLS/HR: at 21:33

## 2022-04-24 RX ADMIN — THIAMINE HYDROCHLORIDE SCH MLS/HR: 100 INJECTION, SOLUTION INTRAMUSCULAR; INTRAVENOUS at 08:29

## 2022-04-24 NOTE — TELE-ICU PROGRESS NOTE
Subjective


Date Seen by a Provider:  Apr 24, 2022


Time Seen by a Provider:  09:15


Subjective/Events-last exam


Admitted for possible abd sepsis, has drainage from recent abd surgery, CT abd 

yesterday showed no abnormal fluid collections, only small amount of ascites, 

probable cirrhosis


on IV Cipro and Flagyl, no + cultures yes


/64, WBC down from 20 to 12, LA also better 3.65 to 2.46 but now back up 

to 3.4





Sepsis Event


Evaluation


Height, Weight, BMI


Height: 5'9.00"


Weight: 179lbs. 0.0oz. 81.335324sk; 34.00 BMI


Method:





Focused Exam


Lactate Level


4/24/22 04:27: Lactic Acid Level 3.65*H


4/24/22 06:40: Lactic Acid Level 2.46*H


4/24/22 08:50: Lactic Acid Level 3.40*H


Lactic Acid Level





Laboratory Tests








Test


 4/24/22


06:40 4/24/22


08:50


 


Lactic Acid Level


 2.46 MMOL/L


(0.50-2.00)  *H 3.40 MMOL/L


(0.50-2.00)  *H











Exam


Exam


Patient acknowledged, consented, and participated in this virtual visit which 

was conducted using real time audio/video


Vital Signs








  Date Time  Temp Pulse Resp B/P (MAP) Pulse Ox O2 Delivery O2 Flow Rate FiO2


 


4/24/22 07:50 36.2       


 


4/24/22 07:42     99 Room Air  


 


4/24/22 07:00  118      


 


4/24/22 06:00  110 25 100/64 99 Room Air  


 


4/24/22 05:00  112 25 90/61 98 Room Air  


 


4/24/22 04:00     100 Room Air  


 


4/24/22 04:00  111 23 109/70 100 Room Air  


 


4/24/22 03:00  112 24 99/62 99 Room Air  


 


4/24/22 02:00  123 20 93/53 97 Room Air  


 


4/24/22 01:09 36.5       


 


4/24/22 01:00  106 14 94/68 98 Room Air  


 


4/24/22 01:00  110      


 


4/24/22 00:00  112 20 102/68 98 Room Air  


 


4/23/22 23:59     97 Room Air  


 


4/23/22 23:00  114 23  99 Room Air  


 


4/23/22 22:15  114 20 94/76 100 Room Air  


 


4/23/22 21:45  111 21 101/62 99 Room Air  


 


4/23/22 21:34  110      


 


4/23/22 21:15  108 19 85/47 97 Room Air  


 


4/23/22 21:00  108 23 85/49 94 Room Air  


 


4/23/22 20:45  110 22 84/47 98 Room Air  


 


4/23/22 20:30     96 Room Air  


 


4/23/22 20:20 36.8 113 17 112/95 100 Room Air  


 


4/23/22 19:42 36.5 103 24 100/52 100   


 


4/23/22 14:48 36.3 125 18 94/61 (72)    














I & O 


 


 4/24/22





 07:00


 


Intake Total 2517.5 ml


 


Output Total 450 ml


 


Balance 2067.5 ml








Height & Weight


Height: 5'9.00"


Weight: 179lbs. 0.0oz. 81.350803sk; 34.00 BMI


Method:


General Appearance:  No Apparent Distress, Chronically ill


HEENT:  PERRL/EOMI, Moist Mucous Membranes; No Scleral Icterus (L), No Scleral 

Icterus (R)


Neck:  Full Range of Motion, Normal Inspection, Non Tender, Supple


Respiratory:  Lungs Clear, No Accessory Muscle Use, No Respiratory Distress


Cardiovascular:  Regular Rate, Rhythm, No JVD, No Murmur, Tachycardia, Other 

(clear drainage from incision from lap yoly)


Capillary Refill:  Less Than 3 Seconds


Gastrointestinal:  normal bowel sounds, non tender, soft, distended (mild 

distension)


Extremity:  Normal Capillary Refill, No Calf Tenderness, No Pedal Edema


Neurologic/Psychiatric:  Alert, Oriented x3, Normal Mood/Affect, Other (oriented

to person and place only)


Skin:  Normal Color, Warm/Dry





Results


Lab


Laboratory Tests


4/23/22 14:45








4/23/22 20:13








4/24/22 04:27











Assessment/Plan


Assessment/Plan


Abd sepsis, appears to be improving, will continue present abx


monitor


if spikes temp do more BC


Critical Care:  Critically Ill Patient


Time spent with patient (mins):  30











CHARAN ROTHMAN MD             Apr 24, 2022 09:19

## 2022-04-24 NOTE — CONSULTATION - SURGERY
CAMERON LUCERO 4/24/22 0953:


History of Present Illness


History of Present Illness


Patient Consulted On(pramod/time)


4/24/22


 09:47


Date Seen by Provider:  Apr 24, 2022


Time Seen by Provider:  07:01


Reason for Visit:  Abdominal pain


History of Present Illness


Mr. Zuleta is a 81 year old male with a past medical history of nodular liver 

cirrhosis, hypertension, and recent cholecystectomy with Dr. Hastings on 04/07/2022.

Patient is a poor historian. He presented to the ED yesterday with complaints of

abdominal pain, low blood pressure, nausea, vomiting, and leakage of closure 

sites from previous surgery. He reported he had been in the hospital for a few 

days and that his cholecystectomy was 2-3 years ago. He reports his abdominal 

pain is resolved since admission. He said time made it better; nothing made it 

worse. He endorsed associated malaise. He denied radiation of any pain. Ac

cording to nursing staff, his umbilical incision was leaking serous fluid that 

soaked through a dressing in 1 hour. He reported complete resolution of his 

sypmtoms at the time of our encounter this morning.





Allergies and Home Medications


Allergies


Coded Allergies:  


     No Known Drug Allergies (Unverified , 4/7/22)





Patient Home Medication List


Glutathione (Glutathione-l) 5 Gm Powder, 5 GM MC DAILY, (Reported)


   Entered as Reported by: JUDE WINKLER on 8/14/20 1347


Hydrocodone/Acetaminophen (Hydrocodone-Acetamin 7.5-325) 1 Each Tablet, 1 EACH 

PO Q4H PRN for PAIN-BREAKTHROUGH


   Prescribed by: ANDREA HUITRON on 4/7/22 0950


Metoprolol Succinate (Metoprolol Succinate) 25 Mg Tab.er.24h, 25 MG PO DAILY, 

(Reported)


   Entered as Reported by: MILLY LEWIS on 10/2/18 1416


Milk Thistle (Milk Thistle) 175 Mg Tablet, 375 MG PO DAILY, (Reported)


   Entered as Reported by: JUDE WINKLER on 8/14/20 1347


Spironolactone (Aldactone) 50 Mg Tablet, 50 MG PO DAILY


   Prescribed by: KEDAR SANFORD on 10/3/18 1153


Vitamin B Complex (Vitamin B Complex) 1 Each Capsule, 1 EACH PO DAILY, 

(Reported)


   Entered as Reported by: JUDE WINKLER on 8/14/20 1347





Past Medical-Social-Family Hx


Patient Social History


Smoking Status:  Former Smoker (Smoked 0.75 PPD for 18 years)


Former Smoker, Quit:  Aug 18, 1991


Type Used:  Cigars


2nd Hand Smoke Exposure:  No


Recent Hopitalizations:  No


Alcohol Use?:  Yes


Have you traveled recently?:  No





Immunizations Up To Date


Date of Influenza Vaccine:  Oct 13, 2021





Seasonal Allergies


Seasonal Allergies:  No





Surgeries


History of Surgeries:  Yes (LT KNEE ACL REPAIR, PILONDIAL CYSTECTOMY)


Surgeries:  Orthopedic, Tonsillectomy





Respiratory


History of Respiratory Disorde:  No





Cardiovascular


History of Cardiac Disorders:  Yes (TACHYCARDIA)


Cardiac Disorders:  Hypertension





Neurological


History of Neurological Disord:  No





Genitourinary


History of Genitourinary Disor:  No





Gastrointestinal


History of Gastrointestinal Di:  Yes (LIVER DISEASE WITH ASCITES)


Gastrointestinal Disorders:  Liver Disease/Jaundice, Cirrhosis, Gall Bladder 

Disease





Musculoskeletal


History of Musculoskeletal Dis:  Yes


Musculoskeletal Disorders:  Back Injury, Chronic Back Pain





Endocrine


History of Endocrine Disorders:  No





HEENT


History of HEENT Disorders:  Yes (WEARS GLASSES)


Loss of Vision:  Denies


Hearing Impairment:  Denies





Cancer


History of Cancer:  No





Psychosocial


History of Psychiatric Problem:  No





Integumentary


History of Skin or Integumenta:  No





Blood Transfusions


History of Blood Disorders:  No


Adverse Reaction to a Blood Tr:  No





Family Medical History


Significant Family History:  No Pertinent Family Hx, Cancer (Brain cancer in 

father, breast cancer in mother)





Review of Systems-General


Constitutional:  No chills, No fever


EENTM:  No blurred vision, No double vision


Cardiovascular:  No chest pain, No palpitations


Gastrointestinal:  No abdominal pain, No nausea (denied at time of exam), No 

vomiting (denied at time of exam)


Psychiatric/Neurological:  Denies Headache, Denies Numbness





Physical Exam-General Problems


Physical Exam


Vital Signs





Vital Signs - First Documented








 4/23/22 4/23/22 4/23/22





 14:48 19:42 20:20


 


Temp 36.3  


 


Pulse 125  


 


Resp 18  


 


B/P (MAP) 94/61 (72)  


 


Pulse Ox  100 


 


O2 Delivery   Room Air





Capillary Refill : Less Than 3 Seconds


General Appearance:  WD/WN, no apparent distress


HEENT:  PERRL/EOMI; No pale conjunctivae (R), No pale conjunctivae (L)


Neck:  non-tender, supple, normal inspection; No lymphadenopathy (R), No lym

phadenopathy (L)


Respiratory:  chest non-tender, lungs clear, normal breath sounds, no 

respiratory distress, no accessory muscle use


Cardiovascular:  normal peripheral pulses, no edema, no murmur, tachycardia


Peripheral Pulses:  2+ Radial Pulses (R), 2+ Radial Pulses (L)


Gastrointestinal:  normal bowel sounds, non tender, soft, other (Umbilical 

laparoscopic port site with serous drainage. Non tender and not erythematous. 

Other sites clean and dry.)


Extremities:  non-tender, normal inspection, no pedal edema


Neurologic/Psychiatric:  no motor/sensory deficits, alert, normal mood/affect; 

No oriented x 3 (Knows name, place, event, and president. Thought it was 2024 

and thought his lap yoly was 2-3 years ago)


Lymphatic:  no adenopathy (Head and neck)





Data Review


Labs


Laboratory Tests


4/23/22 14:45: 


White Blood Count 20.2H, Red Blood Count 3.18L, Hemoglobin 13.3, Hematocrit 36L,

Mean Corpuscular Volume 114H, Mean Corpuscular Hemoglobin 42H, Mean Corpuscular 

Hemoglobin Concent 37H, Red Cell Distribution Width 12.4, Platelet Count 191, 

Mean Platelet Volume 11.0, Immature Granulocyte % (Auto) 1, Neutrophils (%) 

(Auto) 84H, Lymphocytes (%) (Auto) 6L, Monocytes (%) (Auto) 9, Eosinophils (%) 

(Auto) 0, Basophils (%) (Auto) 0, Neutrophils # (Auto) 16.9H, Lymphocytes # 

(Auto) 1.2, Monocytes # (Auto) 1.9H, Eosinophils # (Auto) 0.0, Basophils # 

(Auto) 0.0, Immature Granulocyte # (Auto) 0.2H, Neutrophils % (Manual) 87, 

Lymphocytes % (Manual) 5, Monocytes % (Manual) 8, Toxic Granulation 1+, 

Macrocytosis MODERATE, Prothrombin Time 18.0H, INR Comment 1.4, Activated 

Partial Thromboplast Time 32, Sodium Level 126L, Potassium Level 4.6, Chloride 

Level 97L, Carbon Dioxide Level 12L, Anion Gap 17H, Blood Urea Nitrogen 73H, 

Creatinine 2.48H, Estimat Glomerular Filtration Rate 26, BUN/Creatinine Ratio 

29, Glucose Level 179H, Calcium Level 8.6, Corrected Calcium 9.7, Total 

Bilirubin 3.1H, Aspartate Amino Transf (AST/SGOT) 133H, Alanine Aminotransferase

(ALT/SGPT) 98H, Alkaline Phosphatase 119, C-Reactive Protein High Sensitivity 

3.22H, Total Protein 6.6, Albumin 2.6L, Serum Alcohol < 10


4/23/22 15:25: Lactic Acid Level 3.19*H


4/23/22 16:13: 


Urine Color ORANGE, Urine Clarity CLEAR, Urine pH 5.5, Urine Specific Gravity 

>=1.030, Urine Protein NEGATIVE, Urine Glucose (UA) NEGATIVE, Urine Ketones 

NEGATIVE, Urine Nitrite NEGATIVE, Urine Bilirubin NEGATIVE, Urine Urobilinogen 

0.2, Urine Leukocyte Esterase NEGATIVE, Urine RBC (Auto) NEGATIVE, Urine RBC 

NONE, Urine WBC NONE, Urine Squamous Epithelial Cells NONE, Urine Crystals 

PRESENTH, Urine Uric Acid Crystals MODERATEH, Urine Bacteria TRACE, Urine Casts 

NONE, Urine Mucus NEGATIVE, Urine Culture Indicated NO


4/23/22 18:09: Lactic Acid Level 2.89*H


4/23/22 20:13: 


Sodium Level 129L, Potassium Level 4.6, Chloride Level 102, Carbon Dioxide Level

14L, Anion Gap 13, Blood Urea Nitrogen 66H, Creatinine 2.22H, Estimat Glomerular

Filtration Rate 29, BUN/Creatinine Ratio 30, Glucose Level 147H, Lactic Acid 

Level 2.38*H, Calcium Level 7.9L, Corrected Calcium 9.2, Total Bilirubin 2.9H, 

Aspartate Amino Transf (AST/SGOT) 131H, Alanine Aminotransferase (ALT/SGPT) 92H,

Alkaline Phosphatase 109, Total Protein 6.0L, Albumin 2.4L


4/23/22 22:24: Lactic Acid Level 2.67*H


4/23/22 23:23: 


Blood Gas Puncture Site UNKNOWN, Blood Gas Patient Temperature 36.5, Arterial 

Blood pH 7.45H, Arterial Blood Partial Pressure CO2 21L, Arterial Blood Partial 

Pressure O2 75L, Arterial Blood HCO3 14*L, Arterial Blood Total CO2 14.8L, 

Arterial Blood Oxygen Saturation 96, Arterial Blood Base Excess -9.3L, Rafael 

Test UNKNOWN, Blood Gas Ventilator Setting NO, Blood Gas Inspired Oxygen UNKNOWN


4/24/22 00:20: Lactic Acid Level 2.86*H


4/24/22 02:25: Lactic Acid Level 3.15*H


4/24/22 04:27: 


Lactic Acid Level 3.65*H, White Blood Count 12.3H, Red Blood Count 2.58L, 

Hemoglobin 10.8L, Hematocrit 30L, Mean Corpuscular Volume 117H, Mean Corpuscular

Hemoglobin 42H, Mean Corpuscular Hemoglobin Concent 36, Red Cell Distribution 

Width 12.7, Platelet Count 142, Mean Platelet Volume 10.9, Immature Granulocyte 

% (Auto) 1, Neutrophils (%) (Auto) 76H, Lymphocytes (%) (Auto) 10L, Monocytes 

(%) (Auto) 13H, Eosinophils (%) (Auto) 0, Basophils (%) (Auto) 0, Neutrophils # 

(Auto) 9.4H, Lymphocytes # (Auto) 1.2, Monocytes # (Auto) 1.6H, Eosinophils # 

(Auto) 0.0, Basophils # (Auto) 0.0, Immature Granulocyte # (Auto) 0.1, Sodium 

Level 133L, Potassium Level 4.4, Chloride Level 104, Carbon Dioxide Level 13L, 

Anion Gap 16H, Blood Urea Nitrogen 55H, Creatinine 1.79H, Estimat Glomerular 

Filtration Rate 38, BUN/Creatinine Ratio 31, Glucose Level 139H, Calcium Level 

8.0L, Corrected Calcium 9.1, Phosphorus Level 3.1, Magnesium Level 2.1, Total 

Bilirubin 2.9H, Aspartate Amino Transf (AST/SGOT) 120H, Alanine Aminotransferase

(ALT/SGPT) 82H, Alkaline Phosphatase 93, Total Protein 5.6L, Albumin 2.6L


4/24/22 06:40: Lactic Acid Level 2.46*H


4/24/22 08:50: 


Lactic Acid Level 3.40*H, Ammonia 35H





Radiology


Date of Exam:04/23/22





CT ABDOMEN/PELVIS WO








PROCEDURE: CT abdomen and pelvis without contrast.





TECHNIQUE: Multiple contiguous axial images were obtained through


the abdomen and pelvis without the use of intravenous contrast.


Auto Exposure Controls were utilized during the CT exam to meet


ALARA standards for radiation dose reduction. 





DATE: April 23, 2022.





COMPARISON: Ultrasound abdomen complete January 14, 2022. CT


abdomen and pelvis August 24, 2018. 





INDICATION: 81-year-old male, nausea. Leak at incision site


status post cholecystectomy 2-1/2 weeks ago.





FINDINGS: There are limitations for evaluation of the abdominal


organs, neoplastic processes, abscess, and limited evaluation of


the vasculature relating to the lack of intravenous contrast. 





There is respiratory motion artifact. The heart is not enlarged.


There is no pericardial effusion.





The liver contours are mildly nodular suggesting possible


cirrhosis. Recommend correlation with liver function test. The


gallbladder is surgically absent. There is no intrahepatic or


extrahepatic bile duct dilation. The main pancreatic duct is not


abnormally dilated. Limited noncontrast assessment of the


pancreatic parenchyma is unremarkable. Splenic calcifications


likely relate to prior granulomatous disease. The spleen is not


enlarged. The adrenal glands are unremarkable.





Unremarkable appearance of the renal parenchyma. The urinary


collecting systems are not distended. There is no identified


renal or ureteral stone. The urinary bladder is unremarkable.





There is mild diverticulosis without evidence to suggest acute


diverticulitis. The intestinal tract is not distended. There is


no free intraperitoneal air. There is a small volume ascites.


There is no identified drainable fluid collection.





There are atherosclerotic calcifications. There is no identified


abnormally enlarged lymph node in the abdomen or pelvis meeting


CT size criteria for adenopathy.





There are multilevel degenerative changes of the spine. There is


no identified acute bony abnormality.





IMPRESSION: 


CT abdomen and pelvis: 


1. Findings questionable for cirrhosis with small volume ascites.


Recommend correlation with liver function test. No splenomegaly.


2. No identified acute abnormality in the abdomen or pelvis.





Assessment/Plan


Assessment:





Abdominal pain


- vague and nongeneralized





s/p laparoscopic cholecystectomy


- port sites healing


- umbilical site with serous drainage





Nodular liver cirrhosis


- small volume ascites noted on CT


- likely secondary to alcoholic hepatitis


- elevated liver enzymes, AST > ALT





Hyperbilirubinemia


- total bilirubin 2.9





Hyperammonemia


- 35 this morning





Leukocytosis


- 20.2 on admission


- 12.3 this morning





RITA


- BUN and Cr 55 and 1.79


- Unsure of baseline





Lactic acidosis


- Trend





Hypotension


- BP around 100/60


- patient reports his BP is normally low





Plan:





Continue ciprofloxacin and metronidazole. Continue fluids.


Monitor blood pressure, administer pressors if indicated.


Abdominal dressing change PRN.


Symptomatic control for nausea and vomiting


Patient of Dr. Shresthas, will sign off back to him tomorrow





MARCELO ERYNOSO DO 4/24/22 1509:


History of Present Illness


Consult requested by Dr. Sotmoayor for sepsis/ascites/s/p cholecystectomy.





Patient is a 81 year old male who underwent laparoscopic cholecystectomy on 

4/7/22.  Had ascitic fluid at that time removed.  Patient poor historian. He was

having abdominal pain he states, but now has resolved.  He states he had 

cholecystectomy about 2 years ago.  He is oriented to place and person.  Patient

wanting to go home. He is having ascitic fluid draining from incisions.  He had 

elevated wbc when presented to ED. He had ct scan showing low volume ascites and

cirrhoitic appearance of liver.  Patient curently on Cirpo/FLagyl.  Denies n/v 

fever sweats chills shortness of breath or chest pain.





Allergies and Home Medications


Allergies


Coded Allergies:  


     No Known Drug Allergies (Unverified , 4/7/22)





Patient Home Medication List


Home Medication List Reviewed:  Yes


Glutathione (Glutathione-l) 5 Gm Powder, 5 GM MC DAILY, (Reported)


   Entered as Reported by: JUDE WINKLER on 8/14/20 1347


Hydrocodone/Acetaminophen (Hydrocodone-Acetamin 7.5-325) 1 Each Tablet, 1 EACH 

PO Q4H PRN for PAIN-BREAKTHROUGH


   Prescribed by: ANDREA HUITRON on 4/7/22 0950


Metoprolol Succinate (Metoprolol Succinate) 25 Mg Tab.er.24h, 25 MG PO DAILY, 

(Reported)


   Entered as Reported by: MILLY LEWIS on 10/2/18 1416


Milk Thistle (Milk Thistle) 175 Mg Tablet, 375 MG PO DAILY, (Reported)


   Entered as Reported by: JUDE WINKLER on 8/14/20 1347


Spironolactone (Aldactone) 50 Mg Tablet, 50 MG PO DAILY


   Prescribed by: KEDAR SANFORD on 10/3/18 1153


Vitamin B Complex (Vitamin B Complex) 1 Each Capsule, 1 EACH PO DAILY, 

(Reported)


   Entered as Reported by: JUDE WINKLER on 8/14/20 1347





Past Medical-Social-Family Hx


Surgeries


History of Surgeries:  Yes (LT KNEE ACL REPAIR, PILONDIAL CYSTECTOMY)


Surgeries:  Gallbladder





Reviewed Nursing Assessment


Reviewed/Agree w Nursing PMH:  Yes





Family Medical History


Significant Family History:  No Pertinent Family Hx





Review of Systems-General


Constitutional:  No chills, No fever


EENTM:  No blurred vision, No double vision


Respiratory:  No cough, No dyspnea on exertion


Cardiovascular:  No chest pain, No palpitations


Gastrointestinal:  abdominal pain; No nausea (denied at time of exam), No 

vomiting (denied at time of exam)


Genitourinary:  No decreased output, No discharge


Musculoskeletal:  No back pain, No joint pain


Skin:  No change in color, No change in hair/nails


Psychiatric/Neurological:  Denies Headache, Denies Numbness


All Other Systems Reviewed


Negative Unless Noted:  Yes (Negative excepted noted.)





Physical Exam-General Problems


Physical Exam


General Appearance:  WD/WN, no apparent distress


HEENT:  PERRL/EOMI, normal ENT inspection


Neck:  non-tender, supple


Respiratory:  chest non-tender, no respiratory distress, no accessory muscle use


Cardiovascular:  no edema, no JVD, tachycardia


Gastrointestinal:  soft, distended (minimally,  ascitic appearing fluid 

draining), other (Umbilical laparoscopic port site with ascitic drainage. Non 

tender and not erythematous. Other sites clean and dry.)


Rectal:  deferred


Back:  no CVA tenderness, no vertebral tenderness


Extremities:  non-tender, normal inspection


Neurologic/Psychiatric:  no motor/sensory deficits, alert, normal mood/affect; 

No oriented x 3 (Knows name, place, event, and president. Thought it was 2024 

and thought his lap yoly was 2-3 years ago)


Skin:  normal color, warm/dry


Lymphatic:  no adenopathy (Head and neck)





Assessment/Plan


Abdominal pain


- vague and generalized, no pain at this time.





s/p laparoscopic cholecystectomy


- port sites healing


- umbilical site with ascitic drainage





Nodular liver cirrhosis


- small volume ascites noted on CT


- likely secondary to alcoholic hepatitis


- elevated liver enzymes, AST > ALT


-Has had regular EtOh use.





Hyperbilirubinemia


- total bilirubin 2.9





Hyperammonemia


- 35 this morning





Leukocytosis


- 20.2 on admission


- 12.3 this morning





RITA


- BUN and Cr 55 and 1.79


- Unsure of baseline





Lactic acidosis


- Trend





Hypotension


- BP around 100/60


- patient reports his BP is normally low











Continue ciprofloxacin and metronidazole. Continue fluids.


Monitor blood pressure, administer pressors if indicated.


Abdominal dressing change PRN.


Symptomatic control for nausea and vomiting


Patient of Dr. Thakkar, he is aware he's been admitted  and will see him tomorrow





Supervisory-Addendum Brief


Verification & Attestation


Participated in pt care:  history, MDM, physical


Personally performed:  exam, history, MDM, supervision of care


Care discussed with:  Medical Student


Procedures:  n/a


Results interpretation:  Verified all documentation


Verification and Attestation of Medical Student E/M Service





A medical student performed and documented this service in my presence. I 

reviewed and verified all information documented by the medical student and made

modifications to such information, when appropriate. I personally performed the 

physical exam and medical decision making. 





 Marcelo Reynoso, Apr 24, 2022,15:14











CAMERON LUCERO                 Apr 24, 2022 09:53


MARCELO REYNOSO DO              Apr 24, 2022 15:09

## 2022-04-24 NOTE — HISTORY & PHYSICAL-HOSPITALIST
History of Present Illness


HPI/Chief Complaint


Patient is 81-year-old  male past medical history of cirrhosis, 

hypertension, and recent cholecystectomy who presented to the emergency room due

to drainage from surgical wounds and low blood pressure.  He is a very poor 

historian but per the ER note he also complained of nausea and decreased 

appetite.  He told me his surgery was 2 years ago though records reveal it was 

much more recent than that ().  He does not complain of any of this to me and

states he is feeling very well.  When asked why he came to the hospital then if 

he was feeling well he states because his sister is a nurse.  Reviewing records 

reveals he was seen in the emergency department last week for drainage from 

surgical incisions and a suture was placed.  He saw Dr. WELCH's nurse 

practitioner on  for removal.  Since then he has continued to have 

drainage.  He also continues to drink 1-2 glasses of wine a day.


Date Seen


22


Time Seen by a Provider:  08:00


Attending Physician


Haley Sotomayor MD


PCP


No,Local Physician


Referring Physician





Date of Admission


2022 at 17:30





Home Medications & Allergies


Home Medications


Reviewed patient Home Medication Reconciliation performed by pharmacy medication

reconciliations technician and/or nursing.


Patients Allergies have been reviewed.





Allergies





Allergies


Coded Allergies


  No Known Drug Allergies ([id number])








Past Medical-Social-Family Hx


Patient Social History


Marrital Status:  


Tobacco Use?:  No


Smoking Status:  Unknown if Ever Smoked


Use of E-Cig and/or Vaping dev:  No


Substance use?:  No


Alcohol Use?:  Yes


Alcohol type:  Wine


Alcohol Frequency:  Daily


Pt feels they are or have been:  No





Immunizations Up To Date


Date of Influenza Vaccine:  Oct 13, 2021


First/Initial COVID19 Vaccinat:  2021


Second COVID19 Vaccination Cl:  2021





Seasonal Allergies


Seasonal Allergies:  No





Current Status


Primary Language:  English


Preferred Spoken Language:  English





Past Medical History


Surgeries:  Orthopedic, Tonsillectomy


Hypertension


Liver Disease/Jaundice, Cirrhosis, Gall Bladder Disease


Back Injury, Chronic Back Pain


Loss of Vision:  Denies


Hearing Impairment:  Denies


Blood Disorders:  No


Adverse Reaction/Blood Tranf:  No





cirrhosis





Family Medical History


Reviewed Nursing Family Hx


No Pertinent Family Hx





Review of Systems


ROS-Unable to Obtain:  poor historian, denies all complaints


Constitutional:  see HPI





Physical Exam


Physical Exam


Vital Signs





Vital Signs - First Documented








 22





 14:48 19:42 20:20


 


Temp 36.3  


 


Pulse 125  


 


Resp 18  


 


B/P (MAP) 94/61 (72)  


 


Pulse Ox  100 


 


O2 Delivery   Room Air





Capillary Refill : Less Than 3 Seconds


Height, Weight, BMI


Height: 5'9.00"


Weight: 179lbs. 0.0oz. 81.330264ls; 34.00 BMI


Method:


General Appearance:  No Apparent Distress, Chronically ill


HEENT:  PERRL/EOMI, Moist Mucous Membranes; No Scleral Icterus (L), No Scleral 

Icterus (R)


Respiratory:  Lungs Clear, No Accessory Muscle Use, No Respiratory Distress


Cardiovascular:  No JVD, No Murmur, Tachycardia


Gastrointestinal:  Normal Bowel Sounds, Non Tender, Distended; No Guarding, No 

Rebound; Other (abd pag on umbilical incision to control drainage but left 

surgical wound with serious drainage as well)


Extremity:  Normal Capillary Refill, No Calf Tenderness, No Pedal Edema


Neurologic/Psychiatric:  Alert, Normal Mood/Affect, Other (oriented to person 

and place only)


Skin:  Normal Color, Warm/Dry





Results


Results/Procedures


Labs


Laboratory Tests


22 14:45








22 20:13








22 04:27








Patient resulted labs reviewed.


Imaging:  Reviewed Imaging Report


Imaging


                 [facility] [place], [place]





NAME:   INDIRA BENSON


MED REC#:   [id number]


ACCOUNT#:   [id number]


PT STATUS:   REG ER


:   1940


PHYSICIAN:   HAYDEE HAMILTON


ADMIT DATE:   22/ER


                                  ***Signed***


Date of Exam:22





CT ABDOMEN/PELVIS WO








PROCEDURE: CT abdomen and pelvis without contrast.





TECHNIQUE: Multiple contiguous axial images were obtained through


the abdomen and pelvis without the use of intravenous contrast.


Auto Exposure Controls were utilized during the CT exam to meet


ALARA standards for radiation dose reduction. 





DATE: 2022.





COMPARISON: Ultrasound abdomen complete 2022. CT


abdomen and pelvis 2018. 





INDICATION: 81-year-old male, nausea. Leak at incision site


status post cholecystectomy 2-1/2 weeks ago.





FINDINGS: There are limitations for evaluation of the abdominal


organs, neoplastic processes, abscess, and limited evaluation of


the vasculature relating to the lack of intravenous contrast. 





There is respiratory motion artifact. The heart is not enlarged.


There is no pericardial effusion.





The liver contours are mildly nodular suggesting possible


cirrhosis. Recommend correlation with liver function test. The


gallbladder is surgically absent. There is no intrahepatic or


extrahepatic bile duct dilation. The main pancreatic duct is not


abnormally dilated. Limited noncontrast assessment of the


pancreatic parenchyma is unremarkable. Splenic calcifications


likely relate to prior granulomatous disease. The spleen is not


enlarged. The adrenal glands are unremarkable.





Unremarkable appearance of the renal parenchyma. The urinary


collecting systems are not distended. There is no identified


renal or ureteral stone. The urinary bladder is unremarkable.





There is mild diverticulosis without evidence to suggest acute


diverticulitis. The intestinal tract is not distended. There is


no free intraperitoneal air. There is a small volume ascites.


There is no identified drainable fluid collection.





There are atherosclerotic calcifications. There is no identified


abnormally enlarged lymph node in the abdomen or pelvis meeting


CT size criteria for adenopathy.





There are multilevel degenerative changes of the spine. There is


no identified acute bony abnormality.





IMPRESSION: 


CT abdomen and pelvis: 


1. Findings questionable for cirrhosis with small volume ascites.


Recommend correlation with liver function test. No splenomegaly.


2. No identified acute abnormality in the abdomen or pelvis. 





Dictated by: 





  Dictated on workstation # [id number]








Dict:   22


Trans:   22


[facility] [id number]





Interpreted by:     MER MCMULLEN MD


Electronically signed by: MER MCMULLEN MD 22





Assessment/Plan


Admission Diagnosis


Sepsis


Admission Status:  Inpatient Order (span 2 midnights)


Reason for Inpatient Admission:  


see below





Assessment and Plan


Severe Sepsis


Acute Kidney injury


Metabolic acidosis-lactic acidosis


   Likely SBP


   Continue IV abx- Currently on Cipro and Flagyl


      If condition changes will add 3rd generation cephalosporin but seems to be

improving with this regimen


   Lactic acid likely not clearly due to live failure


   Await cultures


   BP marginal, got albumin over night


   Continue IVF, creatinine improving





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


Recent cholecystectomy


Alcohol abuse


Hyperbilirubinemia


Transaminitis


   Surgery consulted, appreciate deepika MCNEIL protocol


   Trend labs


   Consider palliative consult


   Banana bag


   Check ammonia





DVT ppx: HALEY De Anda MD              2022 08:52

## 2022-04-25 LAB
ALBUMIN SERPL-MCNC: 2.4 GM/DL (ref 3.2–4.5)
ALP SERPL-CCNC: 97 U/L (ref 40–136)
ALT SERPL-CCNC: 95 U/L (ref 0–55)
BASOPHILS # BLD AUTO: 0 10^3/UL (ref 0–0.1)
BASOPHILS NFR BLD AUTO: 0 % (ref 0–10)
BILIRUB SERPL-MCNC: 3.5 MG/DL (ref 0.1–1)
BUN/CREAT SERPL: 27
CALCIUM SERPL-MCNC: 8.2 MG/DL (ref 8.5–10.1)
CHLORIDE SERPL-SCNC: 109 MMOL/L (ref 98–107)
CO2 SERPL-SCNC: 14 MMOL/L (ref 21–32)
CREAT SERPL-MCNC: 1.35 MG/DL (ref 0.6–1.3)
EOSINOPHIL # BLD AUTO: 0 10^3/UL (ref 0–0.3)
EOSINOPHIL NFR BLD AUTO: 0 % (ref 0–10)
GFR SERPLBLD BASED ON 1.73 SQ M-ARVRAT: 53 ML/MIN
GLUCOSE SERPL-MCNC: 166 MG/DL (ref 70–105)
HCT VFR BLD CALC: 32 % (ref 40–54)
HGB BLD-MCNC: 11.6 G/DL (ref 13.3–17.7)
LYMPHOCYTES # BLD AUTO: 0.9 10^3/UL (ref 1–4)
LYMPHOCYTES NFR BLD AUTO: 10 % (ref 12–44)
MAGNESIUM SERPL-MCNC: 1.9 MG/DL (ref 1.6–2.4)
MANUAL DIFFERENTIAL PERFORMED BLD QL: NO
MCH RBC QN AUTO: 43 PG (ref 25–34)
MCHC RBC AUTO-ENTMCNC: 36 G/DL (ref 32–36)
MCV RBC AUTO: 118 FL (ref 80–99)
MONOCYTES # BLD AUTO: 1.5 10^3/UL (ref 0–1)
MONOCYTES NFR BLD AUTO: 15 % (ref 0–12)
NEUTROPHILS # BLD AUTO: 7 10^3/UL (ref 1.8–7.8)
NEUTROPHILS NFR BLD AUTO: 74 % (ref 42–75)
PHOSPHATE SERPL-MCNC: 2.5 MG/DL (ref 2.3–4.7)
PLATELET # BLD: 109 10^3/UL (ref 130–400)
PMV BLD AUTO: 11.2 FL (ref 9–12.2)
POTASSIUM SERPL-SCNC: 4.4 MMOL/L (ref 3.6–5)
PROT SERPL-MCNC: 5.3 GM/DL (ref 6.4–8.2)
SODIUM SERPL-SCNC: 137 MMOL/L (ref 135–145)
WBC # BLD AUTO: 9.5 10^3/UL (ref 4.3–11)

## 2022-04-25 RX ADMIN — AMPICILLIN SODIUM AND SULBACTAM SODIUM SCH MLS/HR: 1; .5 INJECTION, POWDER, FOR SOLUTION INTRAMUSCULAR; INTRAVENOUS at 12:44

## 2022-04-25 RX ADMIN — AMPICILLIN SODIUM AND SULBACTAM SODIUM SCH MLS/HR: 1; .5 INJECTION, POWDER, FOR SOLUTION INTRAMUSCULAR; INTRAVENOUS at 17:42

## 2022-04-25 RX ADMIN — METRONIDAZOLE SCH MLS/HR: 5 INJECTION, SOLUTION INTRAVENOUS at 08:44

## 2022-04-25 RX ADMIN — SODIUM CHLORIDE, SODIUM LACTATE, POTASSIUM CHLORIDE, CALCIUM CHLORIDE, AND DEXTROSE MONOHYDRATE SCH MLS/HR: 600; 310; 30; 20; 5 INJECTION, SOLUTION INTRAVENOUS at 06:10

## 2022-04-25 RX ADMIN — THIAMINE HYDROCHLORIDE SCH MLS/HR: 100 INJECTION, SOLUTION INTRAMUSCULAR; INTRAVENOUS at 08:44

## 2022-04-25 RX ADMIN — Medication SCH MLS/HR: at 15:52

## 2022-04-25 RX ADMIN — ASCORBIC ACID, VITAMIN A PALMITATE, CHOLECALCIFEROL, THIAMINE HYDROCHLORIDE, RIBOFLAVIN-5 PHOSPHATE SODIUM, PYRIDOXINE HYDROCHLORIDE, NIACINAMIDE, DEXPANTHENOL, ALPHA-TOCOPHEROL ACETATE, VITAMIN K1, FOLIC ACID, BIOTIN, CYANOCOBALAMIN SCH MLS/HR: 200; 3300; 200; 6; 3.6; 6; 40; 15; 10; 150; 600; 60; 5 INJECTION, SOLUTION INTRAVENOUS at 08:40

## 2022-04-25 RX ADMIN — CIPROFLOXACIN SCH MLS/HR: 2 INJECTION, SOLUTION INTRAVENOUS at 08:44

## 2022-04-25 RX ADMIN — SODIUM CHLORIDE SCH MLS/HR: 900 INJECTION, SOLUTION INTRAVENOUS at 05:11

## 2022-04-25 RX ADMIN — SODIUM CHLORIDE SCH MLS/HR: 900 INJECTION, SOLUTION INTRAVENOUS at 14:11

## 2022-04-25 NOTE — PROGRESS NOTE - HOSPITALIST
Subjective


HPI/CC On Admission


Date Seen by Provider:  Apr 25, 2022


Time Seen by Provider:  09:00


Patient is 81-year-old  male past medical history of cirrhosis, 

hypertension, and recent cholecystectomy who presented to the emergency room due

to drainage from surgical wounds and low blood pressure.  He is a very poor 

historian but per the ER note he also complained of nausea and decreased 

appetite.  He told me his surgery was 2 years ago though records reveal it was 

much more recent than that (4/7).  He does not complain of any of this to me and

states he is feeling very well.  When asked why he came to the hospital then if 

he was feeling well he states because his sister is a nurse.  Reviewing records 

reveals he was seen in the emergency department last week for drainage from 

surgical incisions and a suture was placed.  He saw Dr. WELCH's nurse 

practitioner on April 20 for removal.  Since then he has continued to have 

drainage.  He also continues to drink 1-2 glasses of wine a day.


Subjective/Events-last exam


He is doing well. He has no complaints or concerns. He denies fevers. He denies 

pain. He denies trouble breathing. He has not had any nausea, vomiting, or 

diarrhea. He has not been able to eat or drink yet.





Focused Exam


Lactate Level


4/24/22 10:44: Lactic Acid Level 2.93*H


4/24/22 12:52: Lactic Acid Level 3.22*H


4/24/22 17:08: Lactic Acid Level 2.39*H








Objective


Exam


Vital Signs





Vital Signs








  Date Time  Temp Pulse Resp B/P (MAP) Pulse Ox O2 Delivery O2 Flow Rate FiO2


 


4/25/22 14:00  130 14 86/64 100 Room Air  


 


4/25/22 12:00 36.4       





Capillary Refill : Less Than 3 Seconds


General Appearance:  No Apparent Distress, WD/WN


Respiratory:  Lungs Clear, No Respiratory Distress


Cardiovascular:  No Murmur, Tachycardia


Gastrointestinal:  Soft, Abnormal Bowel Sounds (hypoactive), Distended


Extremity:  Normal Inspection, Pedal Edema


Neurologic/Psychiatric:  Alert, Normal Mood/Affect


Skin:  Warm/Dry, Jaundice





Results/Procedures


Lab


Laboratory Tests


4/25/22 05:50








Patient resulted labs reviewed.


Imaging:  Reviewed Imaging Report





Assessment/Plan


Assessment and Plan


Assess & Plan/Chief Complaint


Severe sepsis


RITA


Recent cholecystectomy


Possible SBP


Decompensated cirrhosis


   Cultures with Enterococcus


   Transition to Unasyn


   Continue fluids


   Surgery following





DVT ppx: SCDs





Critical Care


Critically Ill Patient











MAGDALENE VALADEZ MD              Apr 25, 2022 14:55

## 2022-04-25 NOTE — PHYSICAL THERAPY EVALUATION
PT Evaluation-General


Medical Diagnosis


Admission Date


Apr 23, 2022 at 17:30


Medical Diagnosis:  post op Olga


Onset Date:  Apr 23, 2022





Therapy Diagnosis


Therapy Diagnosis:  generalized weakness/debility





Height/Weight


Height (Feet):  5


Height (Inches):  9.00


Weight (Pounds):  179


Weight (Ounces):  0.0





Precautions


Precautions/Isolations:  Fall Prevention, Standard Precautions





Referral


Physician:  Darian


Reason for Referral:  Evaluation/Treatment





Medical History


Pertinent Medical History:  HTN


Additional Medical History


liver disease


Current History


s/p cholecystectomy 4/7/2022


Reviewed History:  Yes





Social History


Home:  Single Level


Current Living Status:  Alone





Prior


Prior Level of Function


SCALE: Activities may be completed with or without assistive devices.





6-Indepedent-patient completes the activity by him/herself with no assistance 

from a helper.


5-Set-up or Clean-up Assistance-helper sets up or cleans up; patient completes 

activity. [place] assists only prior to or  


    following the activity.


4-Supervision or Touching Assistance-helper provides verbal cues and/or 

touching/steadying and/or contact guard assistance as patient completes activi

ty. Assistance may be provided   


    throughout the activity or intermittently.


3-Partial/Moderate Assistance-helper does LESS THAN HALF the effort. [place] 

lifts, holds or supports trunk or limbs, but provides less than half the effort.


2-Substantial/Maximal Assistance-helper does MORE THAN HALF the effort. [place] 

lifts or holds trunk or limbs and provides more than half the effort.


6-Vhiayogdx-dbvqfl does ALL the effort. Patient does none of the effort to 

complete the activity. Or, the assistance of 2 or more helpers is required for 

the patient to complete the  


    activity.


If activity was not attempted, code reason:


7-Patient Refused.


9-Not Applicable-not attempted and the patient did not perform the activity 

before the current illness, exacerbation or injury.


10-Not Attempted due to Environmental Limitations-(lack of equipment, weather 

restraints, etc.).


88-Not Attempted due to Medical Conditions or Safety Concerns.


Bed Mobility:  6


Transfers (B,C,W/C):  6


Gait:  6


Stairs:  6


Indoor Mobility (Ambulation):  Independent (furniture walks)


Prior Devices Use:  None





PT Evaluation-Current


Subjective


Patient agrees to PT.  Noted confusion.





Objective


Patient Orientation:  Confused


Attachments:  Purdy Catheter, IV





ROM/Strength


ROM Lower Extremities


bilateral LE WFL


Strength Lower Extremities


3+/5 grossly bilateral





Integumentary/Posture


Integumentary


refer to nursing notes


Bladder Incontinence:  Purdy Cath


Posture


slight trunk flexed posture





Neuromuscular


(Tone, Coordination, Reflexes)


grossly intact





Sensory


Vision:  Wears Glasses


Hearing:  Impaired





Transfers


Lying to Sitting/Side of Bed(Q:  3


Sit to Stand (QC):  4


Chair/Bed-to-Chair Xfer(QC):  4





Gait


Does the Patient Walk?:  Yes


Mode of Locomotion:  Walk


Anticipated Mode of Locomotion:  Walk


Walk 10 feet (QC):  3


Walk 50 ft with 2 Turns(QC):  3


Distance:  50'


Gait Assistive Device:  FWW


Comments/Gait Description


difficulty using FWW due to mild confusion and has not used one prior (patient 

furniture walks at home per his report)





Balance


Sitting Static:  Normal


Sitting Dynamic:  Normal


Standing Static:  Fair


 Standing Dynamic:  Fair





Assessment/Needs


81 y.o. male, will benefit from skilled PT to address functional strength and 

mobility to improve current LOF.  Patient confused and fatigues with minimal 

activity.


Rehab Potential:  Guarded





PT Long Term Goals


Long Term Goals


PT Long Term Goals Time Frame:  May 7, 2022


Roll Left & Right (QC):  6


Sit to Lying (QC):  6


Lying-Sitting on Side/Bed(QC):  6


Sit to Stand (QC):  6


Chair/Bed-to-Chair Xfer(QC):  6


Toilet Transfer (QC):  6


Walk 10 feet (QC):  6


Walk 50ft with 2 Turns (QC):  6


Walk 150 ft (QC):  6





PT Plan


Problem List


Problem List:  Activity Tolerance, Functional Strength, Safety, Balance, Gait, 

Transfer, Bed Mobility





Treatment/Plan


Treatment Plan:  Continue Plan of Care


Treatment Plan:  Bed Mobility, Education, Functional Activity Elia, Functional 

Strength, Gait, Safety, Therapeutic Exercise, Transfers


Treatment Duration:  May 7, 2022


Frequency:  6 times per week


Estimated Hrs Per Day:  .25 hour per day


Patient and/or Family Agrees t:  Yes





Time/GCodes


Time In:  1306


Time Out:  1322


Total Billed Treatment Time:  16


Total Billed Treatment


1 visit


EVModC 16 min











CY SLAAS PT              Apr 25, 2022 13:41

## 2022-04-25 NOTE — DIAGNOSTIC IMAGING REPORT
INDICATION: Abdominal pain



Abdominal sonography performed in all 4 quadrants to evaluate for

ascites. No significant ascites is seen.



IMPRESSION:

Limited abdominal sonography demonstrates no evidence of

significant ascites.



Dictated by: 



  Dictated on workstation # [id number]

## 2022-04-25 NOTE — TELE-ICU PROGRESS NOTE
Subjective


Date Seen by a Provider:  Apr 25, 2022


Time Seen by a Provider:  09:40


Subjective/Events-last exam


(Tele-ICU Physician , Progress Note ) 





Available chart/ vitals / labs / Images reviewed 


Video assessment done using  teleICU camera, rest of exam as per RN 





Discussed with RN , EXAM AS PER RN 


Events overnight :   


Afebrile  


FiO2 - ra


I/O = even





Drips:  


Pressors: , hemodynamically stable 





Consultants:  sx





Hospital course: 


4/213:  82 y/o male presented to the ED from home.  Recent PSH of lap cholecyst

ectomy on (4/7) with incisional draniage and recent UTI on bactrim.  





A/P 


Severe sepsis


- improving on abx 


 - cpm 





RITA


- improving 





Recent cholecystectomy


Possible SBP - as per sx 





Decompensated cirrhosis


+cx Enterococcus - on Unasyn


-  monitor volume status 


   


h/o ETON 


 - thiamine , folate 











VTE Prophylaxis:  scd ( plt 109 , inr 1.4 


Stress Ulcer Prophylaxis:  





 


Plans in collaboration with bedside consultants and IM MDs. 


Discussed with RN to reach out if any questions or concerns 


A total of 20 minutes of critical care time was devoted to this patient today, 

required to treat and/or prevent further deterioration of critical care 

condition ( as above)





Sepsis Event


Evaluation


Height, Weight, BMI


Height: 5'9.00"


Weight: 179lbs. 0.0oz. 81.623100bu; 29.40 BMI


Method:





Focused Exam


Lactate Level


4/24/22 10:44: Lactic Acid Level 2.93*H


4/24/22 12:52: Lactic Acid Level 3.22*H


4/24/22 17:08: Lactic Acid Level 2.39*H





Exam


Exam


Patient acknowledged, consented, and participated in this virtual visit which 

was conducted using real time audio/video


Vital Signs








  Date Time  Temp Pulse Resp B/P (MAP) Pulse Ox O2 Delivery O2 Flow Rate FiO2


 


4/25/22 15:22 36.8       


 


4/25/22 15:00  129 25 87/61 98 Room Air  


 


4/25/22 14:00  130 14 86/64 100 Room Air  


 


4/25/22 13:00  130 14 100/77 98 Room Air  


 


4/25/22 12:53  126      


 


4/25/22 12:00  117 22 82/48 96 Room Air  


 


4/25/22 12:00 36.4       


 


4/25/22 12:00     97 Room Air  


 


4/25/22 11:00  124 17 88/56 97 Room Air  


 


4/25/22 10:00  118 22 99/57 100 Room Air  


 


4/25/22 09:00  120 24 100/64 100 Room Air  


 


4/25/22 08:00  118 19 89/60 99 Room Air  


 


4/25/22 08:00     97 Room Air  


 


4/25/22 08:00 36.2       


 


4/25/22 07:57  120      


 


4/25/22 07:00  116 21 85/45 98 Room Air  


 


4/25/22 06:00  124 24 92/59 99 Room Air  


 


4/25/22 05:00  114 19 99/64 96 Room Air  


 


4/25/22 04:00  134 21 115/75 100 Room Air  


 


4/25/22 03:50     97 Room Air  


 


4/25/22 03:50 36.3     Room Air  


 


4/25/22 03:00  112 23 94/57 97 Room Air  


 


4/25/22 02:00  117 23 100/64 96 Room Air  


 


4/25/22 01:00  120      


 


4/25/22 01:00  120 30 95/71 97 Room Air  


 


4/25/22 00:00  117 16 92/71 99 Room Air  


 


4/24/22 23:20 36.9     Room Air  


 


4/24/22 23:20     99 Room Air  


 


4/24/22 23:00  109 32 104/60 95 Room Air  


 


4/24/22 22:00  112 22 99/61 95 Room Air  


 


4/24/22 21:00  121 26 92/59 95 Room Air  


 


4/24/22 20:00  114 21 90/58 97 Room Air  


 


4/24/22 20:00 37.7       


 


4/24/22 19:15     100 Room Air  


 


4/24/22 19:00  128      


 


4/24/22 19:00 36.4 120 25 91/70 100 Room Air  


 


4/24/22 18:00  122 27 108/65 99 Room Air  


 


4/24/22 17:00  118 16 89/52 100 Room Air  


 


4/24/22 16:00 36.3       


 


4/24/22 16:00  116 24 108/79 100 Room Air  


 


4/24/22 15:52     99 Room Air  














I & O 


 


 4/25/22





 07:00


 


Intake Total 2425 ml


 


Output Total 1325 ml


 


Balance 1100 ml








Height & Weight


Height: 5'9.00"


Weight: 179lbs. 0.0oz. 81.738364ut; 29.40 BMI


Method:


General Appearance:  No Apparent Distress, WD/WN


HEENT:  PERRL/EOMI, Moist Mucous Membranes; No Scleral Icterus (L), No Scleral 

Icterus (R)


Neck:  Full Range of Motion, Normal Inspection, Non Tender, Supple


Respiratory:  Lungs Clear, No Respiratory Distress


Cardiovascular:  No Murmur, Tachycardia


Capillary Refill:  Less Than 3 Seconds


Peripheral Pulses:  2+ Radial Pulses (R), 2+ Radial Pulses (L)


Gastrointestinal:  soft, distended (minimally,  ascitic appearing fluid 

draining), other (Umbilical laparoscopic port site with ascitic drainage. Non 

tender and not erythematous. Other sites clean and dry.)


Extremity:  Normal Inspection, Pedal Edema


Neurologic/Psychiatric:  Alert, Normal Mood/Affect


Skin:  Warm/Dry, Jaundice





Results


Lab


Laboratory Tests


4/23/22 20:13








4/24/22 04:27








4/25/22 05:50











Assessment/Plan


Assessment/Plan


`











DOLORES DOW MD         Apr 25, 2022 15:40

## 2022-04-26 LAB
ALBUMIN SERPL-MCNC: 2.3 GM/DL (ref 3.2–4.5)
ALP SERPL-CCNC: 93 U/L (ref 40–136)
ALT SERPL-CCNC: 99 U/L (ref 0–55)
BASOPHILS # BLD AUTO: 0 10^3/UL (ref 0–0.1)
BASOPHILS NFR BLD AUTO: 0 % (ref 0–10)
BILIRUB SERPL-MCNC: 3.6 MG/DL (ref 0.1–1)
BUN/CREAT SERPL: 23
CALCIUM SERPL-MCNC: 8.1 MG/DL (ref 8.5–10.1)
CHLORIDE SERPL-SCNC: 110 MMOL/L (ref 98–107)
CO2 SERPL-SCNC: 15 MMOL/L (ref 21–32)
CREAT SERPL-MCNC: 1.27 MG/DL (ref 0.6–1.3)
EOSINOPHIL # BLD AUTO: 0.1 10^3/UL (ref 0–0.3)
EOSINOPHIL NFR BLD AUTO: 1 % (ref 0–10)
GFR SERPLBLD BASED ON 1.73 SQ M-ARVRAT: 57 ML/MIN
GLUCOSE SERPL-MCNC: 132 MG/DL (ref 70–105)
HCT VFR BLD CALC: 34 % (ref 40–54)
HGB BLD-MCNC: 12.3 G/DL (ref 13.3–17.7)
LYMPHOCYTES # BLD AUTO: 1.2 10^3/UL (ref 1–4)
LYMPHOCYTES NFR BLD AUTO: 12 % (ref 12–44)
MAGNESIUM SERPL-MCNC: 1.9 MG/DL (ref 1.6–2.4)
MANUAL DIFFERENTIAL PERFORMED BLD QL: NO
MCH RBC QN AUTO: 42 PG (ref 25–34)
MCHC RBC AUTO-ENTMCNC: 36 G/DL (ref 32–36)
MCV RBC AUTO: 118 FL (ref 80–99)
MONOCYTES # BLD AUTO: 1.6 10^3/UL (ref 0–1)
MONOCYTES NFR BLD AUTO: 15 % (ref 0–12)
NEUTROPHILS # BLD AUTO: 7.4 10^3/UL (ref 1.8–7.8)
NEUTROPHILS NFR BLD AUTO: 71 % (ref 42–75)
PHOSPHATE SERPL-MCNC: 2.9 MG/DL (ref 2.3–4.7)
PLATELET # BLD: 114 10^3/UL (ref 130–400)
PMV BLD AUTO: 10.9 FL (ref 9–12.2)
POTASSIUM SERPL-SCNC: 4.6 MMOL/L (ref 3.6–5)
PROT SERPL-MCNC: 5.3 GM/DL (ref 6.4–8.2)
SODIUM SERPL-SCNC: 136 MMOL/L (ref 135–145)
WBC # BLD AUTO: 10.3 10^3/UL (ref 4.3–11)

## 2022-04-26 RX ADMIN — ASCORBIC ACID, VITAMIN A PALMITATE, CHOLECALCIFEROL, THIAMINE HYDROCHLORIDE, RIBOFLAVIN-5 PHOSPHATE SODIUM, PYRIDOXINE HYDROCHLORIDE, NIACINAMIDE, DEXPANTHENOL, ALPHA-TOCOPHEROL ACETATE, VITAMIN K1, FOLIC ACID, BIOTIN, CYANOCOBALAMIN SCH MLS/HR: 200; 3300; 200; 6; 3.6; 6; 40; 15; 10; 150; 600; 60; 5 INJECTION, SOLUTION INTRAVENOUS at 08:28

## 2022-04-26 RX ADMIN — Medication SCH MLS/HR: at 17:05

## 2022-04-26 RX ADMIN — THIAMINE HYDROCHLORIDE SCH MLS/HR: 100 INJECTION, SOLUTION INTRAMUSCULAR; INTRAVENOUS at 08:43

## 2022-04-26 RX ADMIN — AMPICILLIN SODIUM AND SULBACTAM SODIUM SCH MLS/HR: 1; .5 INJECTION, POWDER, FOR SOLUTION INTRAMUSCULAR; INTRAVENOUS at 05:46

## 2022-04-26 RX ADMIN — SODIUM CHLORIDE SCH MLS/HR: 900 INJECTION, SOLUTION INTRAVENOUS at 05:47

## 2022-04-26 RX ADMIN — SODIUM CHLORIDE SCH MLS/HR: 900 INJECTION, SOLUTION INTRAVENOUS at 08:10

## 2022-04-26 RX ADMIN — SODIUM CHLORIDE, SODIUM LACTATE, POTASSIUM CHLORIDE, CALCIUM CHLORIDE, AND DEXTROSE MONOHYDRATE SCH MLS/HR: 600; 310; 30; 20; 5 INJECTION, SOLUTION INTRAVENOUS at 08:10

## 2022-04-26 RX ADMIN — AMPICILLIN SODIUM AND SULBACTAM SODIUM SCH MLS/HR: 1; .5 INJECTION, POWDER, FOR SOLUTION INTRAMUSCULAR; INTRAVENOUS at 11:18

## 2022-04-26 RX ADMIN — Medication SCH MLS/HR: at 05:46

## 2022-04-26 RX ADMIN — LORAZEPAM PRN MG: 0.5 TABLET ORAL at 10:25

## 2022-04-26 RX ADMIN — AMPICILLIN SODIUM AND SULBACTAM SODIUM SCH MLS/HR: 1; .5 INJECTION, POWDER, FOR SOLUTION INTRAMUSCULAR; INTRAVENOUS at 18:36

## 2022-04-26 RX ADMIN — THIAMINE HYDROCHLORIDE SCH MLS/HR: 100 INJECTION, SOLUTION INTRAMUSCULAR; INTRAVENOUS at 08:29

## 2022-04-26 RX ADMIN — SODIUM CHLORIDE, SODIUM LACTATE, POTASSIUM CHLORIDE, CALCIUM CHLORIDE, AND DEXTROSE MONOHYDRATE SCH MLS/HR: 600; 310; 30; 20; 5 INJECTION, SOLUTION INTRAVENOUS at 05:47

## 2022-04-26 NOTE — PHYSICAL THERAPY DAILY NOTE
PT Daily Note-Current


Subjective


Pt agrees to PT.





Pain





   Location:  No Pain Reported





Mental Status


Patient Orientation:  Normal For Age


Attachments:  IV





Transfers


SCALE: Activities may be completed with or without assistive devices.





6-Indepedent-patient completes the activity by him/herself with no assistance 

from a helper.


5-Set-up or Clean-up Assistance-helper sets up or cleans up; patient completes 

activity. [place] assists only prior to or  


    following the activity.


4-Supervision or Touching Assistance-helper provides verbal cues and/or 

touching/steadying and/or contact guard assistance as patient completes 

activity. Assistance may be provided   


    throughout the activity or intermittently.


3-Partial/Moderate Assistance-helper does LESS THAN HALF the effort. [place] 

lifts, holds or supports trunk or limbs, but provides less than half the effort.


2-Substantial/Maximal Assistance-helper does MORE THAN HALF the effort. [place] 

lifts or holds trunk or limbs and provides more than half the effort.


2-Lgxdjaftl-dsegxi does ALL the effort. Patient does none of the effort to 

complete the activity. Or, the assistance of 2 or more helpers is required for 

the patient to complete the  


    activity.


If activity was not attempted, code reason:


7-Patient Refused.


9-Not Applicable-not attempted and the patient did not perform the activity 

before the current illness, exacerbation or injury.


10-Not Attempted due to Environmental Limitations-(lack of equipment, weather 

restraints, etc.).


88-Not Attempted due to Medical Conditions or Safety Concerns.


Roll Left & Right (QC):  4


Lying to Sitting/Side of Bed(Q:  4


Sit to Stand (QC):  4


Chair/Bed-to-Chair Xfer(QC):  4


Toilet Transfer (QC):  4





Gait Training


Does the Patient Walk?:  Yes


Distance:  15'


Walk 10 feet (QC):  4


Gait Persons Needed:  1


Gait Assistive Device:  FWW


slighty unsteady at times





Wheelchair Training


Does the Pt Use a Wheelchair?:  No





Exercises


Supine Ex:  Ankle pumps, Quad Set, Glut sets, Heel Slides, Straight leg raise


Supine Reps:  10


Seated Therapy Exercises:  Ankle pumps, Long arc quads, Hip flexion


Seated Reps:  10





Assessment


Current Status:  Fair Progress


Pt is unsteady at times while walking.  Note, elevated  after ambulation 

of 15'.  Patient denies symptoms.  Patient up in recliner with needs met and 

chair alarm activated.





PT Long Term Goals


Long Term Goals


PT Long Term Goals Time Frame:  May 7, 2022


Roll Left & Right (QC):  6


Sit to Lying (QC):  6


Lying-Sitting on Side/Bed(QC):  6


Sit to Stand (QC):  6


Chair/Bed-to-Chair Xfer(QC):  6


Toilet Transfer (QC):  6


Walk 10 feet (QC):  6


Walk 50ft with 2 Turns (QC):  6


Walk 150 ft (QC):  6





PT Plan


Treatment/Plan


Treatment Plan:  Continue Plan of Care


Treatment Plan:  Bed Mobility, Education, Functional Activity Elia, Functional 

Strength, Gait, Safety, Therapeutic Exercise, Transfers


Treatment Duration:  May 7, 2022


Frequency:  6 times per week


Estimated Hrs Per Day:  .25 hour per day


Patient and/or Family Agrees t:  Yes





Time/GCodes


Time In:  725


Time Out:  748


Total Billed Treatment Time:  23


Total Billed Treatment


1, GT (8 min) EX ( 15 min)











CY SALAS PT              Apr 26, 2022 08:36

## 2022-04-26 NOTE — PROGRESS NOTE - HOSPITALIST
Subjective


HPI/CC On Admission


Date Seen by Provider:  Apr 26, 2022


Time Seen by Provider:  09:45


Patient is 81-year-old  male past medical history of cirrhosis, 

hypertension, and recent cholecystectomy who presented to the emergency room due

to drainage from surgical wounds and low blood pressure.  He is a very poor 

historian but per the ER note he also complained of nausea and decreased 

appetite.  He told me his surgery was 2 years ago though records reveal it was 

much more recent than that (4/7).  He does not complain of any of this to me and

states he is feeling very well.  When asked why he came to the hospital then if 

he was feeling well he states because his sister is a nurse.  Reviewing records 

reveals he was seen in the emergency department last week for drainage from 

surgical incisions and a suture was placed.  He saw Dr. WELCH's nurse 

practitioner on April 20 for removal.  Since then he has continued to have 

drainage.  He also continues to drink 1-2 glasses of wine a day.


Subjective/Events-last exam


He is alert and oriented. He answers all questions appropriately. He continually

states he wants to go home. He denies any pain or other complaints.





Focused Exam


Lactate Level


4/24/22 10:44: Lactic Acid Level 2.93*H


4/24/22 12:52: Lactic Acid Level 3.22*H


4/24/22 17:08: Lactic Acid Level 2.39*H








Objective


Exam


Vital Signs





Vital Signs








  Date Time  Temp Pulse Resp B/P (MAP) Pulse Ox O2 Delivery O2 Flow Rate FiO2


 


4/27/22 06:00  125 20 107/59 97 Room Air  


 


4/27/22 00:00 37.0       





Capillary Refill : Less Than 3 Seconds


General Appearance:  No Apparent Distress, Chronically ill


Respiratory:  No Respiratory Distress, Decreased Breath Sounds


Cardiovascular:  No Murmur, Tachycardia


Gastrointestinal:  Soft, Abnormal Bowel Sounds (hypoactive), Distended


Extremity:  Normal Inspection, Pedal Edema


Neurologic/Psychiatric:  Alert, Oriented x3, No Motor/Sensory Deficits, Normal 

Mood/Affect


Skin:  Warm/Dry, Jaundice





Results/Procedures


Lab


Laboratory Tests


4/27/22 03:45








Patient resulted labs reviewed.


Imaging:  Reviewed Imaging Report





Assessment/Plan


Assessment and Plan


Assess & Plan/Chief Complaint


Severe sepsis


RITA


Recent cholecystectomy


Possible SBP


End stage liver disease


Decompensated cirrhosis


Hypotension


Poor prognosis


   Cultures with Enterococcus


   Continue Unasyn


   Continue fluids


   Restarted on pressors


   Surgery following





DVT ppx: SCDs





Critical Care


Critically Ill Patient





Diagnosis/Problems


Diagnosis/Problems





(1) Severe sepsis


Status:  Acute


(2) Abdominal infection


Status:  Acute


(3) S/P cholecystectomy


Status:  Chronic


(4) End-stage liver disease


Status:  Acute


(5) Poor prognosis


Status:  Acute


(6) Hypotension


Status:  Acute











MAGDALENE VALADEZ MD              Apr 26, 2022 15:23

## 2022-04-26 NOTE — DIAGNOSTIC IMAGING REPORT
INDICATION: PICC line placement.



TIME OF EXAM: 05:18 p.m.



COMPARISON: Correlation is made to prior chest from 04/23/2022.



FINDINGS: Heart size is stable. Lungs are clear. No infiltrates

are seen. There is no effusion or pneumothorax. Right upper

extremity PICC line has tip in good position overlying the lower

SVC.



IMPRESSION: Satisfactory PICC line placement.



Dictated by: 



  Dictated on workstation # [id number]

## 2022-04-26 NOTE — PROGRESS NOTE
Subjective


Date Seen by a Provider:  Apr 26, 2022


Time Seen by a Provider:  13:30


Subjective/Events-last exam


doing ok. still has some mild ascites drainagel per umbilical wound. low bp but 

states has always been that way.





Focused Exam


Lactate Level


4/24/22 10:44: Lactic Acid Level 2.93*H


4/24/22 12:52: Lactic Acid Level 3.22*H


4/24/22 17:08: Lactic Acid Level 2.39*H





Objective


Exam





Vital Signs








  Date Time  Temp Pulse Resp B/P (MAP) Pulse Ox O2 Delivery O2 Flow Rate FiO2


 


4/26/22 13:00  124 25 88/60 98 Room Air  


 


4/26/22 12:41  149      


 


4/26/22 12:00  124 21 84/55 99 Room Air  


 


4/26/22 12:00 36.1       


 


4/26/22 11:00  140 19 80/61 100 Room Air  


 


4/26/22 10:00  134 19 89/52 100 Room Air  


 


4/26/22 09:00  133 31 80/56 99 Room Air  


 


4/26/22 08:00     98 Room Air  


 


4/26/22 08:00  135 28 81/52 95 Room Air  


 


4/26/22 08:00 37.1       


 


4/26/22 07:00  120 29 104/72 96 Room Air  


 


4/26/22 07:00  141      


 


4/26/22 06:00  137 31 85/56 94 Room Air  


 


4/26/22 05:46  120  105/76    


 


4/26/22 05:00  125 7 105/76 96 Room Air  


 


4/26/22 04:00  130 6 109/63 97 Room Air  


 


4/26/22 04:00     97 Room Air  


 


4/26/22 04:00 36.2       


 


4/26/22 03:00  128 38 94/61 100 Room Air  


 


4/26/22 03:00 36.2       


 


4/26/22 02:00  122 24 94/58 94 Room Air  


 


4/26/22 02:00 36.2       


 


4/26/22 01:06  125      


 


4/26/22 01:00 36.2       


 


4/26/22 01:00  124 23 105/68 96 Room Air  


 


4/26/22 00:00  120 8 93/68 96 Room Air  


 


4/25/22 23:59     97 Room Air  


 


4/25/22 23:00 36.0       


 


4/25/22 23:00  122 9 104/66 99 Room Air  


 


4/25/22 22:21 36.0       


 


4/25/22 22:00  125 25 91/72 99 Room Air  


 


4/25/22 21:00  117 7 95/56 98 Room Air  


 


4/25/22 20:00  131 37 92/53 96 Room Air  


 


4/25/22 20:00     97 Room Air  


 


4/25/22 19:28 36.5       


 


4/25/22 19:00  120 17 84/53 98 Room Air  


 


4/25/22 19:00  122      


 


4/25/22 18:00  130 26 87/58 98 Room Air  


 


4/25/22 17:00  141 38 102/73 99 Room Air  


 


4/25/22 16:00     97 Room Air  


 


4/25/22 16:00  74 27 96/68 99 Room Air  


 


4/25/22 15:52  120  82/54    


 


4/25/22 15:22 36.8       


 


4/25/22 15:00  129 25 87/61 98 Room Air  


 


4/25/22 14:00  130 14 86/64 100 Room Air  














I & O 


 


 4/26/22





 07:00


 


Intake Total 1271.2 ml


 


Output Total 900 ml


 


Balance 371.2 ml





Capillary Refill : Less Than 3 Seconds


General Appearance:  No Apparent Distress


HEENT:  PERRL/EOMI


Neck:  Full Range of Motion


Respiratory:  Chest Non Tender, Decreased Breath Sounds


Cardiovascular:  Regular Rate, Rhythm


Gastrointestinal:  normal bowel sounds, non tender, soft


Extremity:  Normal Capillary Refill


Neurologic/Psychiatric:  Alert, Oriented x3


Skin:  Normal Color


Lymphatic:  No Adenopathy





Results


Lab


Laboratory Tests


4/26/22 04:15: 


White Blood Count 10.3, Red Blood Count 2.92L, Hemoglobin 12.3L, Hematocrit 34L,

Mean Corpuscular Volume 118H, Mean Corpuscular Hemoglobin 42H, Mean Corpuscular 

Hemoglobin Concent 36, Red Cell Distribution Width 13.0, Platelet Count 114L, 

Mean Platelet Volume 10.9, Immature Granulocyte % (Auto) 1, Neutrophils (%) (

Auto) 71, Lymphocytes (%) (Auto) 12, Monocytes (%) (Auto) 15H, Eosinophils (%) 

(Auto) 1, Basophils (%) (Auto) 0, Neutrophils # (Auto) 7.4, Lymphocytes # (Auto)

1.2, Monocytes # (Auto) 1.6H, Eosinophils # (Auto) 0.1, Basophils # (Auto) 0.0, 

Immature Granulocyte # (Auto) 0.1, Percent Immature Platelet Fraction 4.0, 

Sodium Level 136, Potassium Level 4.6, Chloride Level 110H, Carbon Dioxide Level

15L, Anion Gap 11, Blood Urea Nitrogen 29H, Creatinine 1.27, Estimat Glomerular 

Filtration Rate 57, BUN/Creatinine Ratio 23, Glucose Level 132H, Calcium Level 

8.1L, Corrected Calcium 9.5, Phosphorus Level 2.9, Magnesium Level 1.9, Total 

Bilirubin 3.6H, Aspartate Amino Transf (AST/SGOT) 135H, Alanine Aminotransferase

(ALT/SGPT) 99H, Alkaline Phosphatase 93, Total Protein 5.3L, Albumin 2.3L





Microbiology


4/23/22 MRSA Screen - Final, Complete


          MRSA not isolated


4/23/22 Gram Stain - Final, Complete


          


4/23/22 Wound Culture - Final, Complete


          Mixed Bacterial Marcella


          Enterococcus species


4/23/22 Blood Culture - Preliminary, Resulted


          No growth





Assessment/Plan


Assessment/Plan


Assess & Plan/Chief Complaint


liver cirrhosis s/p lap cholecystectomy 4/7/22.


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


will proceed with placement interrupted figure of 8 sutures across umbilical 

wound. 


low protein diet.


Na and fluid restriction.


cont spironolactone. 


ok for home when ok with IM.











BRENDA WELCH MD                Apr 26, 2022 13:50

## 2022-04-27 LAB
ALBUMIN SERPL-MCNC: 2.3 GM/DL (ref 3.2–4.5)
ALP SERPL-CCNC: 95 U/L (ref 40–136)
ALT SERPL-CCNC: 96 U/L (ref 0–55)
BASOPHILS # BLD AUTO: 0 10^3/UL (ref 0–0.1)
BASOPHILS NFR BLD AUTO: 0 % (ref 0–10)
BILIRUB SERPL-MCNC: 3.4 MG/DL (ref 0.1–1)
BUN/CREAT SERPL: 23
CALCIUM SERPL-MCNC: 8.1 MG/DL (ref 8.5–10.1)
CHLORIDE SERPL-SCNC: 111 MMOL/L (ref 98–107)
CO2 SERPL-SCNC: 16 MMOL/L (ref 21–32)
CREAT SERPL-MCNC: 1.49 MG/DL (ref 0.6–1.3)
EOSINOPHIL # BLD AUTO: 0.1 10^3/UL (ref 0–0.3)
EOSINOPHIL NFR BLD AUTO: 1 % (ref 0–10)
GFR SERPLBLD BASED ON 1.73 SQ M-ARVRAT: 47 ML/MIN
GLUCOSE SERPL-MCNC: 158 MG/DL (ref 70–105)
HCT VFR BLD CALC: 33 % (ref 40–54)
HGB BLD-MCNC: 11.8 G/DL (ref 13.3–17.7)
LYMPHOCYTES # BLD AUTO: 1.1 10^3/UL (ref 1–4)
LYMPHOCYTES NFR BLD AUTO: 8 % (ref 12–44)
MAGNESIUM SERPL-MCNC: 2 MG/DL (ref 1.6–2.4)
MANUAL DIFFERENTIAL PERFORMED BLD QL: NO
MCH RBC QN AUTO: 42 PG (ref 25–34)
MCHC RBC AUTO-ENTMCNC: 36 G/DL (ref 32–36)
MCV RBC AUTO: 118 FL (ref 80–99)
MONOCYTES # BLD AUTO: 1.6 10^3/UL (ref 0–1)
MONOCYTES NFR BLD AUTO: 12 % (ref 0–12)
NEUTROPHILS # BLD AUTO: 10.2 10^3/UL (ref 1.8–7.8)
NEUTROPHILS NFR BLD AUTO: 78 % (ref 42–75)
PHOSPHATE SERPL-MCNC: 2.8 MG/DL (ref 2.3–4.7)
PLATELET # BLD: 98 10^3/UL (ref 130–400)
PMV BLD AUTO: 10.7 FL (ref 9–12.2)
POTASSIUM SERPL-SCNC: 4.8 MMOL/L (ref 3.6–5)
PROT SERPL-MCNC: 5.3 GM/DL (ref 6.4–8.2)
SODIUM SERPL-SCNC: 139 MMOL/L (ref 135–145)
WBC # BLD AUTO: 13.1 10^3/UL (ref 4.3–11)

## 2022-04-27 RX ADMIN — THIAMINE HYDROCHLORIDE SCH MLS/HR: 100 INJECTION, SOLUTION INTRAMUSCULAR; INTRAVENOUS at 08:26

## 2022-04-27 RX ADMIN — MIDODRINE HYDROCHLORIDE SCH MG: 10 TABLET ORAL at 20:24

## 2022-04-27 RX ADMIN — SODIUM CHLORIDE, SODIUM LACTATE, POTASSIUM CHLORIDE, AND CALCIUM CHLORIDE SCH MLS/HR: 600; 310; 30; 20 INJECTION, SOLUTION INTRAVENOUS at 00:20

## 2022-04-27 RX ADMIN — AMPICILLIN SODIUM AND SULBACTAM SODIUM SCH MLS/HR: 1; .5 INJECTION, POWDER, FOR SOLUTION INTRAMUSCULAR; INTRAVENOUS at 11:45

## 2022-04-27 RX ADMIN — LORAZEPAM PRN MG: 1 TABLET ORAL at 04:07

## 2022-04-27 RX ADMIN — AMPICILLIN SODIUM AND SULBACTAM SODIUM SCH MLS/HR: 1; .5 INJECTION, POWDER, FOR SOLUTION INTRAMUSCULAR; INTRAVENOUS at 00:18

## 2022-04-27 RX ADMIN — LORAZEPAM PRN MG: 1 TABLET ORAL at 01:03

## 2022-04-27 RX ADMIN — SODIUM CHLORIDE, SODIUM LACTATE, POTASSIUM CHLORIDE, AND CALCIUM CHLORIDE SCH MLS/HR: 600; 310; 30; 20 INJECTION, SOLUTION INTRAVENOUS at 18:54

## 2022-04-27 RX ADMIN — Medication SCH MLS/HR: at 18:06

## 2022-04-27 RX ADMIN — SODIUM CHLORIDE, SODIUM LACTATE, POTASSIUM CHLORIDE, AND CALCIUM CHLORIDE SCH MLS/HR: 600; 310; 30; 20 INJECTION, SOLUTION INTRAVENOUS at 16:30

## 2022-04-27 RX ADMIN — AMPICILLIN SODIUM AND SULBACTAM SODIUM SCH MLS/HR: 1; .5 INJECTION, POWDER, FOR SOLUTION INTRAMUSCULAR; INTRAVENOUS at 17:24

## 2022-04-27 RX ADMIN — AMPICILLIN SODIUM AND SULBACTAM SODIUM SCH MLS/HR: 1; .5 INJECTION, POWDER, FOR SOLUTION INTRAMUSCULAR; INTRAVENOUS at 06:41

## 2022-04-27 RX ADMIN — SODIUM CHLORIDE, SODIUM LACTATE, POTASSIUM CHLORIDE, AND CALCIUM CHLORIDE SCH MLS/HR: 600; 310; 30; 20 INJECTION, SOLUTION INTRAVENOUS at 08:34

## 2022-04-27 RX ADMIN — ASCORBIC ACID, VITAMIN A PALMITATE, CHOLECALCIFEROL, THIAMINE HYDROCHLORIDE, RIBOFLAVIN-5 PHOSPHATE SODIUM, PYRIDOXINE HYDROCHLORIDE, NIACINAMIDE, DEXPANTHENOL, ALPHA-TOCOPHEROL ACETATE, VITAMIN K1, FOLIC ACID, BIOTIN, CYANOCOBALAMIN SCH MLS/HR: 200; 3300; 200; 6; 3.6; 6; 40; 15; 10; 150; 600; 60; 5 INJECTION, SOLUTION INTRAVENOUS at 08:24

## 2022-04-27 RX ADMIN — Medication SCH MLS/HR: at 10:30

## 2022-04-27 NOTE — PROGRESS NOTE - HOSPITALIST
Subjective


HPI/CC On Admission


Date Seen by Provider:  Apr 27, 2022


Time Seen by Provider:  10:50


Patient is 81-year-old  male past medical history of cirrhosis, 

hypertension, and recent cholecystectomy who presented to the emergency room due

to drainage from surgical wounds and low blood pressure.  He is a very poor 

historian but per the ER note he also complained of nausea and decreased 

appetite.  He told me his surgery was 2 years ago though records reveal it was 

much more recent than that (4/7).  He does not complain of any of this to me and

states he is feeling very well.  When asked why he came to the hospital then if 

he was feeling well he states because his sister is a nurse.  Reviewing records 

reveals he was seen in the emergency department last week for drainage from 

surgical incisions and a suture was placed.  He saw Dr. WELCH's nurse 

practitioner on April 20 for removal.  Since then he has continued to have 

drainage.  He also continues to drink 1-2 glasses of wine a day.


Subjective/Events-last exam


He has no complaints or concerns. He wants to go home.





Objective


Exam


Vital Signs





Vital Signs








  Date Time  Temp Pulse Resp B/P (MAP) Pulse Ox O2 Delivery O2 Flow Rate FiO2


 


4/27/22 18:06  151  72/52    


 


4/27/22 18:00   13  98 Room Air  


 


4/27/22 15:36 36.2       





Capillary Refill : Less Than 3 Seconds


General Appearance:  No Apparent Distress, Chronically ill


Respiratory:  Lungs Clear, No Respiratory Distress


Cardiovascular:  No Murmur, Tachycardia


Gastrointestinal:  Normal Bowel Sounds, Soft, Distended


Extremity:  Normal Inspection, Pedal Edema


Neurologic/Psychiatric:  Alert, Oriented x3, Normal Mood/Affect


Skin:  Warm/Dry, Jaundice





Results/Procedures


Lab


Laboratory Tests


4/27/22 03:45








Patient resulted labs reviewed.


Imaging:  Reviewed Imaging Report





Assessment/Plan


Assessment and Plan


Assess & Plan/Chief Complaint


Severe sepsis


RITA


Recent cholecystectomy


Possible SBP


End stage liver disease


Decompensated cirrhosis


Hypotension


Poor prognosis


   Cultures with Enterococcus


   Continue Unasyn


   Continue fluids


   Continue pressors


   Add midodrine


   Surgery following





DVT ppx: SCDs





Critical Care


Critically Ill Patient





Diagnosis/Problems


Diagnosis/Problems





(1) Severe sepsis


Status:  Acute


(2) Abdominal infection


Status:  Acute


(3) S/P cholecystectomy


Status:  Chronic


(4) End-stage liver disease


Status:  Acute


(5) Poor prognosis


Status:  Acute


(6) Hypotension


Status:  Acute











MAGDALENE VALADEZ MD              Apr 27, 2022 18:37

## 2022-04-27 NOTE — PHYSICAL THERAPY PROGRESS NOTE
Therapy Progress Note


Patient laying in bed and HR is 144 bpm at rest.  Nurse states he is in a-fib 

and she was just getting ready to notify the doctor.  Patient will be on hold 

this afternoon.  Will check back in the morning.











FARSHAD GODWIN PT                Apr 27, 2022 14:02

## 2022-04-28 LAB
ALBUMIN SERPL-MCNC: 2.3 GM/DL (ref 3.2–4.5)
ALP SERPL-CCNC: 107 U/L (ref 40–136)
ALT SERPL-CCNC: 83 U/L (ref 0–55)
BASOPHILS # BLD AUTO: 0.1 10^3/UL (ref 0–0.1)
BASOPHILS NFR BLD AUTO: 0 % (ref 0–10)
BILIRUB SERPL-MCNC: 2.9 MG/DL (ref 0.1–1)
BUN/CREAT SERPL: 21
CALCIUM SERPL-MCNC: 7.9 MG/DL (ref 8.5–10.1)
CHLORIDE SERPL-SCNC: 110 MMOL/L (ref 98–107)
CO2 SERPL-SCNC: 14 MMOL/L (ref 21–32)
CREAT SERPL-MCNC: 1.68 MG/DL (ref 0.6–1.3)
EOSINOPHIL # BLD AUTO: 0.1 10^3/UL (ref 0–0.3)
EOSINOPHIL NFR BLD AUTO: 1 % (ref 0–10)
GFR SERPLBLD BASED ON 1.73 SQ M-ARVRAT: 41 ML/MIN
GLUCOSE SERPL-MCNC: 157 MG/DL (ref 70–105)
HCT VFR BLD CALC: 34 % (ref 40–54)
HGB BLD-MCNC: 12 G/DL (ref 13.3–17.7)
LYMPHOCYTES # BLD AUTO: 1.2 10^3/UL (ref 1–4)
LYMPHOCYTES NFR BLD AUTO: 8 % (ref 12–44)
MAGNESIUM SERPL-MCNC: 1.9 MG/DL (ref 1.6–2.4)
MANUAL DIFFERENTIAL PERFORMED BLD QL: NO
MCH RBC QN AUTO: 42 PG (ref 25–34)
MCHC RBC AUTO-ENTMCNC: 36 G/DL (ref 32–36)
MCV RBC AUTO: 117 FL (ref 80–99)
MONOCYTES # BLD AUTO: 2.1 10^3/UL (ref 0–1)
MONOCYTES NFR BLD AUTO: 13 % (ref 0–12)
NEUTROPHILS # BLD AUTO: 12.2 10^3/UL (ref 1.8–7.8)
NEUTROPHILS NFR BLD AUTO: 78 % (ref 42–75)
PHOSPHATE SERPL-MCNC: 3.1 MG/DL (ref 2.3–4.7)
PLATELET # BLD: 59 10^3/UL (ref 130–400)
PMV BLD AUTO: 10.6 FL (ref 9–12.2)
POTASSIUM SERPL-SCNC: 4.5 MMOL/L (ref 3.6–5)
PROT SERPL-MCNC: 5.4 GM/DL (ref 6.4–8.2)
SODIUM SERPL-SCNC: 137 MMOL/L (ref 135–145)
WBC # BLD AUTO: 15.7 10^3/UL (ref 4.3–11)

## 2022-04-28 RX ADMIN — MIDODRINE HYDROCHLORIDE SCH MG: 10 TABLET ORAL at 18:26

## 2022-04-28 RX ADMIN — SODIUM CHLORIDE, SODIUM LACTATE, POTASSIUM CHLORIDE, AND CALCIUM CHLORIDE SCH MLS/HR: 600; 310; 30; 20 INJECTION, SOLUTION INTRAVENOUS at 01:24

## 2022-04-28 RX ADMIN — ASCORBIC ACID, VITAMIN A PALMITATE, CHOLECALCIFEROL, THIAMINE HYDROCHLORIDE, RIBOFLAVIN-5 PHOSPHATE SODIUM, PYRIDOXINE HYDROCHLORIDE, NIACINAMIDE, DEXPANTHENOL, ALPHA-TOCOPHEROL ACETATE, VITAMIN K1, FOLIC ACID, BIOTIN, CYANOCOBALAMIN SCH MLS/HR: 200; 3300; 200; 6; 3.6; 6; 40; 15; 10; 150; 600; 60; 5 INJECTION, SOLUTION INTRAVENOUS at 08:26

## 2022-04-28 RX ADMIN — Medication SCH MLS/HR: at 07:46

## 2022-04-28 RX ADMIN — AMPICILLIN SODIUM AND SULBACTAM SODIUM SCH MLS/HR: 1; .5 INJECTION, POWDER, FOR SOLUTION INTRAMUSCULAR; INTRAVENOUS at 18:26

## 2022-04-28 RX ADMIN — MIDODRINE HYDROCHLORIDE SCH MG: 10 TABLET ORAL at 12:06

## 2022-04-28 RX ADMIN — AMPICILLIN SODIUM AND SULBACTAM SODIUM SCH MLS/HR: 1; .5 INJECTION, POWDER, FOR SOLUTION INTRAMUSCULAR; INTRAVENOUS at 12:06

## 2022-04-28 RX ADMIN — Medication SCH MLS/HR: at 19:06

## 2022-04-28 RX ADMIN — AMPICILLIN SODIUM AND SULBACTAM SODIUM SCH MLS/HR: 1; .5 INJECTION, POWDER, FOR SOLUTION INTRAMUSCULAR; INTRAVENOUS at 06:43

## 2022-04-28 RX ADMIN — MIDODRINE HYDROCHLORIDE SCH MG: 10 TABLET ORAL at 07:48

## 2022-04-28 RX ADMIN — AMPICILLIN SODIUM AND SULBACTAM SODIUM SCH MLS/HR: 1; .5 INJECTION, POWDER, FOR SOLUTION INTRAMUSCULAR; INTRAVENOUS at 01:22

## 2022-04-28 RX ADMIN — THIAMINE HYDROCHLORIDE SCH MLS/HR: 100 INJECTION, SOLUTION INTRAMUSCULAR; INTRAVENOUS at 08:26

## 2022-04-28 RX ADMIN — Medication SCH MLS/HR: at 22:13

## 2022-04-28 RX ADMIN — Medication SCH MLS/HR: at 13:28

## 2022-04-28 RX ADMIN — LORAZEPAM PRN MG: 2 INJECTION INTRAMUSCULAR; INTRAVENOUS at 20:48

## 2022-04-28 RX ADMIN — Medication SCH MLS/HR: at 01:23

## 2022-04-28 RX ADMIN — SODIUM CHLORIDE, SODIUM LACTATE, POTASSIUM CHLORIDE, AND CALCIUM CHLORIDE SCH MLS/HR: 600; 310; 30; 20 INJECTION, SOLUTION INTRAVENOUS at 12:06

## 2022-04-28 RX ADMIN — SODIUM CHLORIDE, SODIUM LACTATE, POTASSIUM CHLORIDE, AND CALCIUM CHLORIDE SCH MLS/HR: 600; 310; 30; 20 INJECTION, SOLUTION INTRAVENOUS at 19:34

## 2022-04-28 NOTE — PROGRESS NOTE - HOSPITALIST
Subjective


HPI/CC On Admission


Date Seen by Provider:  Apr 28, 2022


Time Seen by Provider:  09:25


Patient is 81-year-old  male past medical history of cirrhosis, 

hypertension, and recent cholecystectomy who presented to the emergency room due

to drainage from surgical wounds and low blood pressure.  He is a very poor 

historian but per the ER note he also complained of nausea and decreased 

appetite.  He told me his surgery was 2 years ago though records reveal it was 

much more recent than that (4/7).  He does not complain of any of this to me and

states he is feeling very well.  When asked why he came to the hospital then if 

he was feeling well he states because his sister is a nurse.  Reviewing records 

reveals he was seen in the emergency department last week for drainage from 

surgical incisions and a suture was placed.  He saw Dr. WELCH's nurse 

practitioner on April 20 for removal.  Since then he has continued to have 

drainage.  He also continues to drink 1-2 glasses of wine a day.


Subjective/Events-last exam


He is feeling well. He wants to go home. He wants to sit in his recliner. He den

ies pain. He denies shortness of breath. His wife is at the bedside. We 

discussed his prognosis and options. We also discussed code status.





Objective


Exam


Vital Signs





Vital Signs








  Date Time  Temp Pulse Resp B/P (MAP) Pulse Ox O2 Delivery O2 Flow Rate FiO2


 


4/28/22 18:00  137 33 81/52  Room Air  


 


4/28/22 16:00 36.2       


 


4/28/22 16:00     98   





Capillary Refill : Less Than 3 Seconds


General Appearance:  No Apparent Distress, Chronically ill, Obese


Respiratory:  Lungs Clear, No Respiratory Distress


Cardiovascular:  No Murmur, Tachycardia


Gastrointestinal:  Normal Bowel Sounds, Non Tender, Distended; No Guarding


Extremity:  Normal Inspection, Pedal Edema


Neurologic/Psychiatric:  Alert, Oriented x3, Normal Mood/Affect


Skin:  Warm/Dry, Jaundice





Results/Procedures


Lab


Laboratory Tests


4/28/22 05:20








Patient resulted labs reviewed.


Imaging:  Reviewed Imaging Report





Assessment/Plan


Assessment and Plan


Assess & Plan/Chief Complaint


Severe sepsis


RITA


Recent cholecystectomy


Possible SBP


End stage liver disease


Decompensated cirrhosis


Hypotension


Poor prognosis


Goals of care discussion


   Cultures with Enterococcus


   Continue Unasyn


   Continue fluids


   Continue pressors


   Continue midodrine


   Surgery following


   Discussed code status, patient and wife agree to DNR


   Palliative care consulted





DVT ppx: SCDs





Critical Care


Critically Ill Patient





Diagnosis/Problems


Diagnosis/Problems





(1) Severe sepsis


Status:  Acute


(2) Abdominal infection


Status:  Acute


(3) S/P cholecystectomy


Status:  Chronic


(4) End-stage liver disease


Status:  Acute


(5) Poor prognosis


Status:  Acute


(6) Hypotension


Status:  Acute











MAGDALENE VALADEZ MD              Apr 28, 2022 18:52

## 2022-04-28 NOTE — TELE-ICU PROGRESS NOTE
Subjective


Date Seen by a Provider:  Apr 28, 2022


Time Seen by a Provider:  08:05


Subjective/Events-last exam


This virtual visit was conducted using real time audio/video.


Thank you for asking us to see this patient for sepsis


PE: VSS.  O2 sat 98% on RA





HEENT: No obvious masses, adenopathy or JVD.


              Chest: clear to auscultation.


              CV: RRR S1 S2 No murmur or added sounds.


              Abd: Non-tender. Bowel sounds Y.


              : Unremarkable. Purdy N.


              CNS/psychiatric: Grossly intact. No obvious focal findings.


              Extremities: Trace edema. Capillary refill < 3 seconds.


              Skin: unremarkable.





Results: Elevated WCC 15.7, BUN 36, Creat 1.68.   Decreased Hb 12, plts 59K., 

Alb 2.3..   CXR: clear.


Available chart/ vitals / labs / images reviewed.


Video assessment done using teleICU camera, rest of exam as per RN.





A/P: Critical Care: critically ill patient. Cont.Levo., SCDs, thiamine, 

folate,midodrine. Wean Levo as sanna.


Hem consult w low plts.





Discussed with RN Andie. Asked RN to reach out to eICU if any questions or 

concerns later. 


Time spent with patient/coordination of care with other health professionals 

(mins): 20





Sepsis Event


Evaluation


Height, Weight, BMI


Height: 5'9.00"


Weight: 179lbs. 0.0oz. [id number]; 32.72 BMI


Method:





Exam


Exam


Patient acknowledged, consented, and participated in this virtual visit which 

was conducted using real time audio/video


Vital Signs








  Date Time  Temp Pulse Resp B/P (MAP) Pulse Ox O2 Delivery O2 Flow Rate FiO2


 


4/28/22 07:46  137  87/56    


 


4/28/22 07:33 36.0       


 


4/28/22 07:00  136      


 


4/28/22 06:00  138 24 115/70 90 Room Air  


 


4/28/22 05:00  137 23 100/62 96 Room Air  


 


4/28/22 04:00  138 22 114/71 100 Room Air  


 


4/28/22 04:00 36.6       


 


4/28/22 04:00     96 Room Air  


 


4/28/22 03:00  141 22 105/86 96 Room Air  


 


4/28/22 02:00  113 17 85/75 91 Room Air  


 


4/28/22 01:23  138  118/71    


 


4/28/22 01:00  133 19 118/71 98 Room Air  


 


4/28/22 01:00  133      


 


4/28/22 00:00     96 Room Air  


 


4/28/22 00:00 36.4 142 19 65/41 97 Room Air  


 


4/27/22 23:00  131 25 111/75 97 Room Air  


 


4/27/22 22:00  133 19 104/75 97 Room Air  


 


4/27/22 21:00  141 15 100/53 98 Room Air  


 


4/27/22 20:00  149 21 106/59 96 Room Air  


 


4/27/22 20:00     96 Room Air  


 


4/27/22 19:29 36.3       


 


4/27/22 19:00  151 21 119/77 98 Room Air  


 


4/27/22 19:00  151      


 


4/27/22 18:06  151  72/52    


 


4/27/22 18:00  99 13 69/56 98 Room Air  


 


4/27/22 17:00  151 22 72/52 96 Room Air  


 


4/27/22 16:00  140 18 93/58 98 Room Air  


 


4/27/22 16:00     96 Room Air  


 


4/27/22 15:36 36.2       


 


4/27/22 15:00  134  103/75 96 Room Air  


 


4/27/22 14:00  137 24 90/63 97 Room Air  


 


4/27/22 13:00  141 23 94/70 100 Room Air  


 


4/27/22 13:00  140      


 


4/27/22 12:00     94 Room Air  


 


4/27/22 12:00  134 20 95/72 98 Room Air  


 


4/27/22 12:00 36.0       


 


4/27/22 11:00  140 18 112/96 97 Room Air  


 


4/27/22 10:30  134  107/74    


 


4/27/22 10:00  134 7 107/74 99 Room Air  














I & O 


 


 4/28/22





 07:00


 


Intake Total 2535 ml


 


Output Total 575 ml


 


Balance 1960 ml








Height & Weight


Height: 5'9.00"


Weight: 179lbs. 0.0oz. 81.913631mf; 32.72 BMI


Method:


General Appearance:  No Apparent Distress, Chronically ill


HEENT:  PERRL/EOMI


Neck:  Full Range of Motion


Respiratory:  Lungs Clear, No Respiratory Distress


Cardiovascular:  No Murmur, Tachycardia


Capillary Refill:  Less Than 3 Seconds


Peripheral Pulses:  2+ Radial Pulses (R), 2+ Radial Pulses (L)


Gastrointestinal:  normal bowel sounds, non tender, soft


Extremity:  Normal Inspection, Pedal Edema


Neurologic/Psychiatric:  Alert, Oriented x3, Normal Mood/Affect


Skin:  Warm/Dry, Jaundice


Lymphatic:  No Adenopathy





Results


Lab


Laboratory Tests


4/27/22 03:45








4/28/22 05:20











Assessment/Plan


Assessment/Plan


See free text.


Critical Care:  Critically Ill Patient











DIANE ABEL MD          Apr 28, 2022 09:27

## 2022-04-28 NOTE — PHYSICAL THERAPY PROGRESS NOTE
Therapy Progress Note


Patient refuses physical therapy twice today.  The benefits of therapy are 

explained to patient but he continues to refuse.  He states "I just don't want 

to, I want to stay in bed".  Will check back tomorrow.  Also, patient's resting 

HR is still running anywhere from 130 bpm to 140 bpm.











FARSHAD GODWIN PT                Apr 28, 2022 13:39

## 2022-04-29 LAB
ALBUMIN SERPL-MCNC: 2.2 GM/DL (ref 3.2–4.5)
ALP SERPL-CCNC: 114 U/L (ref 40–136)
ALT SERPL-CCNC: 76 U/L (ref 0–55)
APTT BLD: 46 SEC (ref 24–35)
BASOPHILS # BLD AUTO: 0.1 10^3/UL (ref 0–0.1)
BASOPHILS NFR BLD AUTO: 0 % (ref 0–10)
BILIRUB SERPL-MCNC: 2.9 MG/DL (ref 0.1–1)
BUN/CREAT SERPL: 20
CALCIUM SERPL-MCNC: 7.5 MG/DL (ref 8.5–10.1)
CHLORIDE SERPL-SCNC: 111 MMOL/L (ref 98–107)
CO2 SERPL-SCNC: 13 MMOL/L (ref 21–32)
CREAT SERPL-MCNC: 1.8 MG/DL (ref 0.6–1.3)
D DIMER PPP FEU-MCNC: 18.14 UG/ML (ref 0–0.49)
EOSINOPHIL # BLD AUTO: 0.1 10^3/UL (ref 0–0.3)
EOSINOPHIL NFR BLD AUTO: 1 % (ref 0–10)
FIBRINOGEN PPP-MCNC: 147 MG/DL (ref 221–496)
GFR SERPLBLD BASED ON 1.73 SQ M-ARVRAT: 38 ML/MIN
GLUCOSE SERPL-MCNC: 140 MG/DL (ref 70–105)
HCT VFR BLD CALC: 32 % (ref 40–54)
HGB BLD-MCNC: 11.3 G/DL (ref 13.3–17.7)
INR PPP: 1.9 (ref 0.8–1.4)
LYMPHOCYTES # BLD AUTO: 1.3 10^3/UL (ref 1–4)
LYMPHOCYTES NFR BLD AUTO: 9 % (ref 12–44)
MAGNESIUM SERPL-MCNC: 1.7 MG/DL (ref 1.6–2.4)
MANUAL DIFFERENTIAL PERFORMED BLD QL: NO
MCH RBC QN AUTO: 42 PG (ref 25–34)
MCHC RBC AUTO-ENTMCNC: 36 G/DL (ref 32–36)
MCV RBC AUTO: 117 FL (ref 80–99)
MONOCYTES # BLD AUTO: 2 10^3/UL (ref 0–1)
MONOCYTES NFR BLD AUTO: 13 % (ref 0–12)
NEUTROPHILS # BLD AUTO: 11.6 10^3/UL (ref 1.8–7.8)
NEUTROPHILS NFR BLD AUTO: 76 % (ref 42–75)
PHOSPHATE SERPL-MCNC: 3.1 MG/DL (ref 2.3–4.7)
PLATELET # BLD: 35 10^3/UL (ref 130–400)
PMV BLD AUTO: 11.1 FL (ref 9–12.2)
POTASSIUM SERPL-SCNC: 4.2 MMOL/L (ref 3.6–5)
PROT SERPL-MCNC: 5 GM/DL (ref 6.4–8.2)
PROTHROMBIN TIME: 22.4 SEC (ref 12.2–14.7)
SODIUM SERPL-SCNC: 137 MMOL/L (ref 135–145)
WBC # BLD AUTO: 15.2 10^3/UL (ref 4.3–11)

## 2022-04-29 RX ADMIN — AMPICILLIN SODIUM AND SULBACTAM SODIUM SCH MLS/HR: 1; .5 INJECTION, POWDER, FOR SOLUTION INTRAMUSCULAR; INTRAVENOUS at 00:00

## 2022-04-29 RX ADMIN — THIAMINE HYDROCHLORIDE SCH MLS/HR: 100 INJECTION, SOLUTION INTRAMUSCULAR; INTRAVENOUS at 09:15

## 2022-04-29 RX ADMIN — Medication SCH MG: at 09:49

## 2022-04-29 RX ADMIN — NOREPINEPHRINE BITARTRATE SCH MLS/HR: 1 INJECTION, SOLUTION, CONCENTRATE INTRAVENOUS at 20:24

## 2022-04-29 RX ADMIN — NOREPINEPHRINE BITARTRATE SCH MLS/HR: 1 INJECTION, SOLUTION, CONCENTRATE INTRAVENOUS at 15:42

## 2022-04-29 RX ADMIN — AMPICILLIN SODIUM AND SULBACTAM SODIUM SCH MLS/HR: 1; .5 INJECTION, POWDER, FOR SOLUTION INTRAMUSCULAR; INTRAVENOUS at 12:27

## 2022-04-29 RX ADMIN — Medication SCH MLS/HR: at 12:43

## 2022-04-29 RX ADMIN — ASCORBIC ACID, VITAMIN A PALMITATE, CHOLECALCIFEROL, THIAMINE HYDROCHLORIDE, RIBOFLAVIN-5 PHOSPHATE SODIUM, PYRIDOXINE HYDROCHLORIDE, NIACINAMIDE, DEXPANTHENOL, ALPHA-TOCOPHEROL ACETATE, VITAMIN K1, FOLIC ACID, BIOTIN, CYANOCOBALAMIN SCH MLS/HR: 200; 3300; 200; 6; 3.6; 6; 40; 15; 10; 150; 600; 60; 5 INJECTION, SOLUTION INTRAVENOUS at 09:14

## 2022-04-29 RX ADMIN — Medication SCH MLS/HR: at 02:31

## 2022-04-29 RX ADMIN — MIDODRINE HYDROCHLORIDE SCH MG: 10 TABLET ORAL at 12:27

## 2022-04-29 RX ADMIN — Medication SCH MLS/HR: at 09:51

## 2022-04-29 RX ADMIN — MIDODRINE HYDROCHLORIDE SCH MG: 10 TABLET ORAL at 17:52

## 2022-04-29 RX ADMIN — MIDODRINE HYDROCHLORIDE SCH MG: 10 TABLET ORAL at 09:14

## 2022-04-29 RX ADMIN — SODIUM CHLORIDE SCH MLS/HR: 4.5 INJECTION, SOLUTION INTRAVENOUS at 09:23

## 2022-04-29 RX ADMIN — SODIUM CHLORIDE, SODIUM LACTATE, POTASSIUM CHLORIDE, AND CALCIUM CHLORIDE SCH MLS/HR: 600; 310; 30; 20 INJECTION, SOLUTION INTRAVENOUS at 05:36

## 2022-04-29 RX ADMIN — AMPICILLIN SODIUM AND SULBACTAM SODIUM SCH MLS/HR: 1; .5 INJECTION, POWDER, FOR SOLUTION INTRAMUSCULAR; INTRAVENOUS at 05:19

## 2022-04-29 RX ADMIN — AMPICILLIN SODIUM AND SULBACTAM SODIUM SCH MLS/HR: 1; .5 INJECTION, POWDER, FOR SOLUTION INTRAMUSCULAR; INTRAVENOUS at 23:56

## 2022-04-29 RX ADMIN — Medication SCH MLS/HR: at 06:32

## 2022-04-29 RX ADMIN — SODIUM CHLORIDE SCH MLS/HR: 4.5 INJECTION, SOLUTION INTRAVENOUS at 20:26

## 2022-04-29 RX ADMIN — AMPICILLIN SODIUM AND SULBACTAM SODIUM SCH MLS/HR: 1; .5 INJECTION, POWDER, FOR SOLUTION INTRAMUSCULAR; INTRAVENOUS at 17:52

## 2022-04-29 NOTE — TELE-ICU PROGRESS NOTE
Progress Note


HAs developed sinus tach rate 140s


BP labile with systolic  on low dose levophed





Have asked RN to increase levophed to support BP


Will check troponins and give fluid bolus and diigitalize





Focused Exam


Height, Weight, BMI


Height: 5'9.00"


Weight: 179lbs. 0.0oz. 81.398450ru; 32.72 BMI


Method:


Laboratory Tests


4/28/22 05:20








Labs


Labs


Laboratory Tests


4/28/22 05:20: 


White Blood Count 15.7H, Red Blood Count 2.89L, Hemoglobin 12.0L, Hematocrit 34L

, Mean Corpuscular Volume 117H, Mean Corpuscular Hemoglobin 42H, Mean 

Corpuscular Hemoglobin Concent 36, Red Cell Distribution Width 13.3, Platelet 

Count 59L, Mean Platelet Volume 10.6, Immature Granulocyte % (Auto) 1, 

Neutrophils (%) (Auto) 78H, Lymphocytes (%) (Auto) 8L, Monocytes (%) (Auto) 13H,

Eosinophils (%) (Auto) 1, Basophils (%) (Auto) 0, Neutrophils # (Auto) 12.2H, 

Lymphocytes # (Auto) 1.2, Monocytes # (Auto) 2.1H, Eosinophils # (Auto) 0.1, 

Basophils # (Auto) 0.1, Immature Granulocyte # (Auto) 0.1, Percent Immature 

Platelet Fraction 4.4, Sodium Level 137, Potassium Level 4.5, Chloride Level 

110H, Carbon Dioxide Level 14L, Anion Gap 13, Blood Urea Nitrogen 36H, 

Creatinine 1.68H, Estimat Glomerular Filtration Rate 41, BUN/Creatinine Ratio 

21, Glucose Level 157H, Calcium Level 7.9L, Corrected Calcium 9.3, Phosphorus 

Level 3.1, Magnesium Level 1.9, Total Bilirubin 2.9H, Aspartate Amino Transf 

(AST/SGOT) 99H, Alanine Aminotransferase (ALT/SGPT) 83H, Alkaline Phosphatase 

107, Total Protein 5.4L, Albumin 2.3L


4/28/22 11:31: Glucometer 172H


4/28/22 17:40: Glucometer 155H


4/28/22 23:03: Glucometer 150H





Microbiology


4/23/22 MRSA Screen - Final, Complete


          MRSA not isolated


4/23/22 Gram Stain - Final, Complete


          


4/23/22 Wound Culture - Final, Complete


          Mixed Bacterial Marcella


          Enterococcus species


4/23/22 Blood Culture - Final, Complete


          No growth











CHARAN RUIZ MD            Apr 29, 2022 05:19

## 2022-04-29 NOTE — PHYSICAL THERAPY PROGRESS NOTE
Therapy Progress Note


Patient on hold for today.  His resting HR is running between 170 and 190 bpm 

currently.  Will check back tomorrow.











FARSHAD GODWIN PT                Apr 29, 2022 09:28

## 2022-04-29 NOTE — DIAGNOSTIC IMAGING REPORT
PROCEDURE: CT chest with contrast, CT abdomen and pelvis with and

without contrast.



TECHNIQUE: Pre and post intravenous contrast axial imaging of the

abdomen and pelvis and post contrast axial imaging of the chest

were performed. Auto Exposure Controls were utilized during the

CT exam to meet ALARA standards for radiation dose reduction. 



INDICATION:  Status post recent laparoscopic cholecystectomy. Now

with increased abdominal distention.



COMPARISON: 04/23/2022



FINDINGS: 



CT CHEST: Evaluation of the lung fields demonstrates mild

bilateral pleural effusions and associated atelectasis. Pulmonary

parenchymal evaluation is degraded by motion artifact. Pulmonary

nodule may be obscured. There is no pneumothorax on either side.



Cardiomediastinal structures show mild cardiomegaly. There is no

large pericardial effusion. Moderate to advanced calcified

coronary and mild scattered calcified aortic, calcified

atherosclerosis is noted. No pathologically enlarged or

morphologically abnormal adenopathy is seen within the

mediastinum, adithya, nor axilla.



Osseous structures show age-related degenerative changes. No

lytic or blastic bony lesions are seen.



CT ABDOMEN: There is moderate abdominal pelvic free fluid. This

has progressed since the previous exam. No loculated air-fluid

collection is seen. There is no pneumoperitoneum.



A few colonic diverticula are noted. There is no CT evidence of

acute diverticulitis. Small bowel loops are nondistended. Normal

appendix cannot be adequately identified.



Multiple punctate nonobstructive renal calculi are identified

bilaterally. Otherwise, the kidneys, adrenal glands, spleen,

pancreas, and liver have a normal CT appearance. Portal vein

shows normal enhancement. No abnormal mesenteric or

retroperitoneal adenopathy is seen. Mild to moderate calcified

aortic and arterial atherosclerosis is noted. Osseous structures

show no acute abnormalities.



CT PELVIS: Purdy catheter is present within the urinary bladder.

Urinary bladder is decompressed. Again, there is pelvic free

fluid. There is no loculated air-fluid collection or

pneumoperitoneum. No abnormal lymph nodes are identified. Osseous

structures show no acute abnormalities.



IMPRESSION:

1. Interval progression of moderate abdominal pelvic free fluid.

While this may be on the basis of progressive ascites, bile leak

should be considered given the history of recent cholecystectomy.

2. No loculated air-fluid collection or pneumoperitoneum.

3. Multiple punctate bilateral nonobstructive renal calculi.

4. Mild bilateral pleural effusions with associated atelectasis.



Dictated by: 



  Dictated on workstation # [id number]

## 2022-04-29 NOTE — CONSULTATION-CARDIOLOGY
HPI-Cardiology


Cardiology Consultation:


Date of Consultation


4/29/22


Time Seen by a Provider:  09:10


Date of Admission


4/23/22


Attending Physician


Kaykay Farmer MD


Admitting Physician


No,Local Physician


Consulting Physician


DAYANA FOURNIER MD,MA,  FACP, FACC, [facility], CCDS





Physician requesting Card consult: Dr Farmer





HPI:


Chief Complaint:


Reason for Card consultation: Sinus tach





81 year old male with a h/o liver cirrhosis who had a lap yoly by Dr Hastings on 

4/7/22. Readmitted on 4/23/22 with continuing abdominal discomfort and with 

leakage from the incision. Notes gen malaise and weakness. Does not report cp or

palp or syncope. Feels short of breath. Dr Farmer has asked us to see him today 

for persistent sinus tach





Review of Systems-Cardiology


Review of Systems


Constitutional:  malaise


Eyes:  No vision change


Ears/Nose/Throat:  No recent hearing loss


Respiratory:  As described under HPI


Cardiovascular:  As described under HPI


Gastrointestinal:  As described under HPI


Genitourinary:  No dysuria, No hematuria


Musculoskeletal:  No joint pain


Skin:  No rash, No ulcerations


Psychiatric/Neurological:  No seizure, No focal weakness, No syncope


Hematologic:  No bleeding abnormalities





All Other Systems Reviewed


Negative Unless Noted:  Yes (Negative excepted noted.)





PMH-Social-Family Hx


Patient Social History


Marrital Status:  


Smoking Status:  Former Smoker (Smoked 0.75 PPD for 18 years)


Former smoker/When Quit:  Mar 1, 1990


2nd Hand Smoke Exposure:  No


Have you traveled recently?:  No


Alcohol Use?:  Yes


Pt feels they are or have been:  No





Immunizations Up To Date


Date of Influenza Vaccine:  Oct 13, 2021





Past Medical History


PMH


As described under Assessment.





Family Medical History


Family Medical History:  


Fam h/o early CAD





Allergies and Home Medications


Allergies


Coded Allergies:  


     No Known Drug Allergies (Unverified , 4/7/22)





Patient Home Medication List


Home Medication List Reviewed:  Yes


Glutamine (l-Glutamine) 500 Mg Capsule, 500 MG PO DAILY, (Reported)


   Entered as Reported by: CARLY LOPEZ on 4/25/22 1131


   Last Action: Reviewed


Metoprolol Succinate (Metoprolol Succinate) 25 Mg Tab.er.24h, 25 MG PO DAILY, 

(Reported)


   Entered as Reported by: MILLY LEWIS on 10/2/18 1416


   Last Action: Reviewed


Milk Thistle (Milk Thistle) 175 Mg Tablet, 375 MG PO DAILY, (Reported)


   Entered as Reported by: JUDE WINKLER on 8/14/20 1347


   Last Action: Reviewed


Spironolactone (Spironolactone) 50 Mg Tablet, 50 MG PO DAILY, (Reported)


   Entered as Reported by: CARLY LOPEZ on 4/25/22 1131


   Last Action: Reviewed


Sulfamethoxazole/Trimethoprim (Bactrim Ds Tablet) 1 Each Tablet, 1 EA PO BID, 

(Reported)


   Entered as Reported by: CARLY LOPEZ on 4/25/22 1131


   Last Action: Reviewed


Vitamin B Complex (Vitamin B Complex) 1 Each Capsule, 1 EACH PO DAILY, 

(Reported)


   Entered as Reported by: JUDE WINKLER on 8/14/20 1347


   Last Action: Reviewed


Discontinued Medications


Glutathione (Glutathione-l) 5 Gm Powder, 5 GM MC DAILY, (Reported)


   Discontinued Reason: Prescription changed


   Entered as Reported by: JUDE WINKLER on 8/14/20 1347


Hydrocodone/Acetaminophen (Hydrocodone-Acetamin 7.5-325) 1 Each Tablet, 1 EACH 

PO Q4H PRN for PAIN-BREAKTHROUGH


   Discontinued Reason: No Longer Taking


   Prescribed by: ANDREA HUITRON on 4/7/22 0950


   Last Action: Discontinued


Spironolactone (Aldactone) 50 Mg Tablet, 50 MG PO DAILY


   Discontinued Reason: No Longer Taking


   Prescribed by: KEDAR SANFORD on 10/3/18 1153


   Last Action: Discontinued





Physical Exam-Cardiology


Physical Exam


Vital Signs/I&O











 4/28/22 4/28/22 4/28/22 4/29/22





 22:00 22:13 23:00 00:00


 


Pulse 141  146 142


 


Resp 33  33 33


 


B/P (MAP) 76/52 123/66 88/65 115/77


 


O2 Delivery Room Air  Room Air Room Air





 4/29/22 4/29/22 4/29/22 4/29/22





 00:00 00:00 01:00 01:00


 


Temp 36.1   


 


Pulse   144 144


 


Resp   24 


 


B/P (MAP)   114/76 


 


Pulse Ox  95 99 


 


O2 Delivery  Room Air Room Air 


 


    





 4/29/22 4/29/22 4/29/22 4/29/22





 02:00 02:31 03:00 04:00


 


Pulse 149  144 146


 


Resp 24  21 26


 


B/P (MAP) 96/57 89/62 85/51 96/63


 


Pulse Ox 92  99 94


 


O2 Delivery Room Air  Room Air Room Air





 4/29/22 4/29/22 4/29/22 4/29/22





 04:00 04:00 05:00 06:00


 


Temp 35.9   


 


Pulse   146 163


 


Resp   24 24


 


B/P (MAP)   85/54 113/70


 


Pulse Ox  95 92 91


 


O2 Delivery  Room Air Room Air Room Air


 


    





 4/29/22 4/29/22 4/29/22 4/29/22





 06:10 06:32 07:00 07:00


 


Pulse  149 154 157


 


Resp   20 


 


B/P (MAP)  91/69 106/76 


 


Pulse Ox 87  96 


 


O2 Delivery Nasal Cannula  Nasal Cannula 


 


O2 Flow Rate 2.00  2.00 





 4/29/22 4/29/22 4/29/22 4/29/22





 08:00 08:00 08:00 08:00


 


Temp  36.3  


 


Pulse    154


 


B/P (MAP)    100/67


 


Pulse Ox 95   98


 


O2 Delivery Nasal Cannula  Nasal Cannula Nasal Cannula


 


O2 Flow Rate 3.00  3.00 2.00


 


    





 4/29/22 4/29/22 4/29/22 





 08:33 09:00 09:00 


 


Pulse   158 


 


Resp   27 


 


B/P (MAP)   92/66 


 


Pulse Ox   94 


 


O2 Delivery Nasal Cannula Nasal Cannula Nasal Cannula 


 


O2 Flow Rate 2.00 3.00 2.00 














 4/29/22





 00:00


 


Intake Total 1880 ml


 


Output Total 250 ml


 


Balance 1630 ml





Capillary Refill : Less Than 3 Seconds


Constitutional:  AAO x 3, well-developed, well-nourished


HEENT:  PERRL, EOMI; No xanthelasmas are seen


Neck:  carotid pulses are 2 + bilaterally


Respiratory:  other (diminished air entry especially at the bases)


Cardiovascular:  regular rate-rhythm, S1 and S2, systolic murmur (soft JOESPH at 

card base)


Gastrointestinal:  distended, other (serous drainage from subumbilical incision)


Extremities:  swelling (mod edema of all extremities)


Neurologic/Psychiatric:  oriented x 3, other (moves all limbs)


Skin:  No rash on exposed areas, No ulcerations on exposed areas





Data Review


Labs


Laboratory Tests


4/28/22 11:31: Glucometer 172H


4/28/22 17:40: Glucometer 155H


4/28/22 23:03: Glucometer 150H


4/29/22 05:20: 


White Blood Count 15.2H, Red Blood Count 2.70L, Hemoglobin 11.3L, Hematocrit 32L

, Mean Corpuscular Volume 117H, Mean Corpuscular Hemoglobin 42H, Mean 

Corpuscular Hemoglobin Concent 36, Red Cell Distribution Width 13.7, Platelet 

Count 35*L, Mean Platelet Volume 11.1, Immature Granulocyte % (Auto) 1, 

Neutrophils (%) (Auto) 76H, Lymphocytes (%) (Auto) 9L, Monocytes (%) (Auto) 13H,

Eosinophils (%) (Auto) 1, Basophils (%) (Auto) 0, Neutrophils # (Auto) 11.6H, 

Lymphocytes # (Auto) 1.3, Monocytes # (Auto) 2.0H, Eosinophils # (Auto) 0.1, 

Basophils # (Auto) 0.1, Immature Granulocyte # (Auto) 0.1, Percent Immature 

Platelet Fraction 6.1, Sodium Level 137, Potassium Level 4.2, Chloride Level 

111H, Carbon Dioxide Level 13L, Anion Gap 13, Blood Urea Nitrogen 36H, 

Creatinine 1.80H, Estimat Glomerular Filtration Rate 38, BUN/Creatinine Ratio 

20, Glucose Level 140H, Calcium Level 7.5L, Corrected Calcium 8.9, Phosphorus 

Level 3.1, Magnesium Level 1.7, Total Bilirubin 2.9H, Aspartate Amino Transf 

(AST/SGOT) 92H, Alanine Aminotransferase (ALT/SGPT) 76H, Alkaline Phosphatase 

114, Total Protein 5.0L, Albumin 2.2L





Microbiology


4/23/22 MRSA Screen - Final, Complete


          MRSA not isolated


4/23/22 Gram Stain - Final, Complete


          


4/23/22 Wound Culture - Final, Complete


          Mixed Bacterial Marcella


          Enterococcus species


4/23/22 Blood Culture - Final, Complete


          No growth





Laboratory Tests


4/28/22 05:20








4/29/22 05:20











A/P-Cardiology


Assessment/Admission Diagnosis


Acute on chronic hepatic failure, complicated by the following:


* Sepsis and septic shock


* Probable peritonitis following recent cholecystectomy


* Generalized anasarca likely due to hypoproteinemia


* Probable hepatorenal syndrome and acute kidney injury


* Probable DIC


* Severe metabolic acidosis








CAD


- Cardiac cath of 3-1-16 (following a 3-beat run of WCT on 2/19/16) showed 

angiographically mild CAD. Normal global LV systolic function with an LVEF of 

approx 60%. Normal LVEDP. No significant MR.


- MPI of 6/18/19 showed no ischemia or infarction and EF 72%


- Echo on 6/20/19: LVEF 60-65%, grade 1 hunter dysfunction, mild MR< mild TR, RVSP

30 mmHg





H/o calcific densities in the pulmonary fields for which we states he been 

following with his PCP.


- States he has h/o histoplasmosis





Carotid dz


- Carotid u/s on 6-4-21: minimal plaque





Hyperlipidemia


- Not suitable for statin therapy due to chronic liver enz elev





Fam h/o early CAD





Discussion and Recomendations





* Complex management and poor prognosis


* Sinus tach due to pressors. Treat acidosis in the hope of being able to reduce

  pressors and that such reduction in pressors will lower heart rate


* Consider switching pressors to Neosynephrine. Theoretically, it may lower 

  heart rate











DAYANA FOURNIER MD FACP [facility]   Apr 29, 2022 09:44

## 2022-04-29 NOTE — PROGRESS NOTE
Subjective


Date Seen by a Provider:  Apr 29, 2022


Time Seen by a Provider:  11:00


Subjective/Events-last exam


patient confused. likely secondary to hyperammonemia. wound still draining 

however much less. abd distention likely consistent with recurrent ascites.





Objective


Exam





Vital Signs








  Date Time  Temp Pulse Resp B/P (MAP) Pulse Ox O2 Delivery O2 Flow Rate FiO2


 


4/29/22 11:00  154 29 84/68 95 Nasal Cannula 3.00 


 


4/29/22 10:00  163 29 91/79 96 Nasal Cannula 3.00 


 


4/29/22 09:51    92/72    


 


4/29/22 09:00  158 27 92/66 94 Nasal Cannula 2.00 


 


4/29/22 09:00      Nasal Cannula 3.00 


 


4/29/22 08:33      Nasal Cannula 2.00 


 


4/29/22 08:00  154  100/67 98 Nasal Cannula 2.00 


 


4/29/22 08:00      Nasal Cannula 3.00 


 


4/29/22 08:00 36.3       


 


4/29/22 08:00     95 Nasal Cannula 3.00 


 


4/29/22 07:00  157      


 


4/29/22 07:00  154 20 106/76 96 Nasal Cannula 2.00 


 


4/29/22 06:32  149  91/69    


 


4/29/22 06:10     87 Nasal Cannula 2.00 


 


4/29/22 06:00  163 24 113/70 91 Room Air  


 


4/29/22 05:00  146 24 85/54 92 Room Air  


 


4/29/22 04:00     95 Room Air  


 


4/29/22 04:00 35.9       


 


4/29/22 04:00  146 26 96/63 94 Room Air  


 


4/29/22 03:00  144 21 85/51 99 Room Air  


 


4/29/22 02:31    89/62    


 


4/29/22 02:00  149 24 96/57 92 Room Air  


 


4/29/22 01:00  144      


 


4/29/22 01:00  144 24 114/76 99 Room Air  


 


4/29/22 00:00     95 Room Air  


 


4/29/22 00:00 36.1       


 


4/29/22 00:00  142 33 115/77  Room Air  


 


4/28/22 23:00  146 33 88/65  Room Air  


 


4/28/22 22:13    123/66    


 


4/28/22 22:00  141 33 76/52  Room Air  


 


4/28/22 21:00  137 33 122/76  Room Air  


 


4/28/22 20:00  144 33 96/54  Room Air  


 


4/28/22 20:00     95 Room Air  


 


4/28/22 20:00 36.2       


 


4/28/22 19:06    83/67    


 


4/28/22 19:00  133 33 105/63  Room Air  


 


4/28/22 19:00  133      


 


4/28/22 18:00  137 33 81/52  Room Air  


 


4/28/22 17:00  141 11 84/58  Room Air  


 


4/28/22 16:00 36.2       


 


4/28/22 16:00     95 Room Air  


 


4/28/22 16:00  142 19 105/68 98 Room Air  


 


4/28/22 15:00  146 42  96 Room Air  


 


4/28/22 14:00  131 20 91/60 98 Room Air  


 


4/28/22 13:28  140  93/51    


 


4/28/22 13:00  141 34 80/67 95 Room Air  


 


4/28/22 13:00  143      


 


4/28/22 12:00     95 Room Air  


 


4/28/22 12:00  140 21 93/51 98 Room Air  


 


4/28/22 11:56 35.9       














I & O 


 


 4/29/22





 07:00


 


Intake Total 3850 ml


 


Output Total 625 ml


 


Balance 3225 ml





Capillary Refill : Less Than 3 Seconds


General Appearance:  No Apparent Distress


HEENT:  PERRL/EOMI


Neck:  Full Range of Motion


Respiratory:  Chest Non Tender, Decreased Breath Sounds


Cardiovascular:  Regular Rate, Rhythm


Gastrointestinal:  soft, distended


Extremity:  Normal Capillary Refill


Neurologic/Psychiatric:  Disoriented


Skin:  Normal Color


Lymphatic:  No Adenopathy





Results


Lab


Laboratory Tests


4/28/22 11:31: Glucometer 172H


4/28/22 17:40: Glucometer 155H


4/28/22 23:03: Glucometer 150H


4/29/22 05:20: 


White Blood Count 15.2H, Red Blood Count 2.70L, Hemoglobin 11.3L, Hematocrit 32L

, Mean Corpuscular Volume 117H, Mean Corpuscular Hemoglobin 42H, Mean 

Corpuscular Hemoglobin Concent 36, Red Cell Distribution Width 13.7, Platelet 

Count 35*L, Mean Platelet Volume 11.1, Immature Granulocyte % (Auto) 1, 

Neutrophils (%) (Auto) 76H, Lymphocytes (%) (Auto) 9L, Monocytes (%) (Auto) 13H,

Eosinophils (%) (Auto) 1, Basophils (%) (Auto) 0, Neutrophils # (Auto) 11.6H, 

Lymphocytes # (Auto) 1.3, Monocytes # (Auto) 2.0H, Eosinophils # (Auto) 0.1, 

Basophils # (Auto) 0.1, Immature Granulocyte # (Auto) 0.1, Percent Immature 

Platelet Fraction 6.1, Sodium Level 137, Potassium Level 4.2, Chloride Level 

111H, Carbon Dioxide Level 13L, Anion Gap 13, Blood Urea Nitrogen 36H, 

Creatinine 1.80H, Estimat Glomerular Filtration Rate 38, BUN/Creatinine Ratio 

20, Glucose Level 140H, Calcium Level 7.5L, Corrected Calcium 8.9, Phosphorus 

Level 3.1, Magnesium Level 1.7, Total Bilirubin 2.9H, Aspartate Amino Transf 

(AST/SGOT) 92H, Alanine Aminotransferase (ALT/SGPT) 76H, Alkaline Phosphatase 

114, Total Protein 5.0L, Albumin 2.2L





Microbiology


4/23/22 MRSA Screen - Final, Complete


          MRSA not isolated


4/23/22 Gram Stain - Final, Complete


          


4/23/22 Wound Culture - Final, Complete


          Mixed Bacterial Marcella


          Enterococcus species


4/23/22 Blood Culture - Final, Complete


          No growth





Assessment/Plan


Assessment/Plan


Assess & Plan/Chief Complaint


liver cirrhosis s/p lap cholecystectomy 4/7/22.


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


will proceed with placement interrupted figure of 8 sutures across umbilical 

wound. 


low protein diet.


Na and fluid restriction.


cont spironolactone. 


continues to be hypotensive and tachycardic. will get CT chest/abd/pelvis.











BRENDA WELCH MD                Apr 29, 2022 11:28

## 2022-04-29 NOTE — PROGRESS NOTE - HOSPITALIST
Subjective


HPI/CC On Admission


Date Seen by Provider:  Apr 29, 2022


Time Seen by Provider:  09:30


Patient is 81-year-old  male past medical history of cirrhosis, 

hypertension, and recent cholecystectomy who presented to the emergency room due

to drainage from surgical wounds and low blood pressure.  He is a very poor 

historian but per the ER note he also complained of nausea and decreased 

appetite.  He told me his surgery was 2 years ago though records reveal it was 

much more recent than that (4/7).  He does not complain of any of this to me and

states he is feeling very well.  When asked why he came to the hospital then if 

he was feeling well he states because his sister is a nurse.  Reviewing records 

reveals he was seen in the emergency department last week for drainage from 

surgical incisions and a suture was placed.  He saw Dr. WELCH's nurse 

practitioner on April 20 for removal.  Since then he has continued to have 

drainage.  He also continues to drink 1-2 glasses of wine a day.


Subjective/Events-last exam


He is more confused today. He is slurring his speech a bit. He denies complaints

or concerns. His family is present and is updated regarding his current status, 

plan, and prognosis.





Objective


Exam


Vital Signs





Vital Signs








  Date Time  Temp Pulse Resp B/P (MAP) Pulse Ox O2 Delivery O2 Flow Rate FiO2


 


4/29/22 17:00  154 26 89/74 91 Nasal Cannula 3.00 


 


4/29/22 15:25 36.3       





Capillary Refill : Less Than 3 Seconds


General Appearance:  Chronically ill, Mild Distress (abnormal respirations), Ob

micheal


Respiratory:  Decreased Breath Sounds, Respiratory Distress (abnormal respi

rations)


Cardiovascular:  No Murmur, Tachycardia


Gastrointestinal:  Normal Bowel Sounds, Distended


Extremity:  Normal Inspection, Pedal Edema


Neurologic/Psychiatric:  Alert, Motor Weakness


Skin:  Warm/Dry, Jaundice





Results/Procedures


Lab


Laboratory Tests


4/29/22 05:20








Patient resulted labs reviewed.


Imaging:  Reviewed Imaging Report





Assessment/Plan


Assessment and Plan


Assess & Plan/Chief Complaint


Septic shock


Oliguric RITA


Recent cholecystectomy


Possible SBP


Thrombocytopenia


DIC


End stage liver disease


Decompensated cirrhosis


Poor prognosis


Goals of care discussion


   Cultures with Enterococcus


   CT chest/abdomen today


   Continue Unasyn


   Continue fluids, adding Bicarb


   Continue pressors


   Continue midodrine


   Surgery following


   Palliative care consulted





DVT ppx: SCDs





Critical Care


Critically Ill Patient





Diagnosis/Problems


Diagnosis/Problems





(1) Septic shock


Status:  Acute


(2) DIC (disseminated intravascular coagulation)


Status:  Acute


(3) Severe sepsis


Status:  Acute


(4) Abdominal infection


Status:  Acute


(5) S/P cholecystectomy


Status:  Chronic


(6) End-stage liver disease


Status:  Acute


(7) Poor prognosis


Status:  Acute


(8) Hypotension


Status:  Acute











MAGDALENE VALADEZ MD              Apr 29, 2022 17:50

## 2022-04-29 NOTE — CONSULTATION-CARDIOLOGY
HPI-Cardiology


Cardiology Consultation:


Date of Consultation


22


Date of Admission


22


Attending Physician


Magdalene Valadez MD


Admitting Physician


No,Local Physician


Consulting Physician


VANESSA SEGOVIA





Review of Systems-Cardiology


All Other Systems Reviewed


Negative Unless Noted:  Yes (Negative excepted noted.)





PMH-Social-Family Hx


Patient Social History


Marrital Status:  


Smoking Status:  Former Smoker (Smoked 0.75 PPD for 18 years)


Former smoker/When Quit:  Mar 1, 1990


2nd Hand Smoke Exposure:  No


Have you traveled recently?:  No


Alcohol Use?:  Yes


Pt feels they are or have been:  No





Immunizations Up To Date


Date of Influenza Vaccine:  Oct 13, 2021





Past Medical History


PMH


As described under Assessment.





Allergies and Home Medications


Allergies


Coded Allergies:  


     No Known Drug Allergies (Unverified , 22)





Patient Home Medication List


Glutamine (l-Glutamine) 500 Mg Capsule, 500 MG PO DAILY, (Reported)


   Entered as Reported by: CARLY LOPEZ on 22 113


   Last Action: Reviewed


Metoprolol Succinate (Metoprolol Succinate) 25 Mg Tab.er.24h, 25 MG PO DAILY, 

(Reported)


   Entered as Reported by: MILLY LEWIS on 10/2/18 1416


   Last Action: Reviewed


Milk Thistle (Milk Thistle) 175 Mg Tablet, 375 MG PO DAILY, (Reported)


   Entered as Reported by: JUDE WINKLER on 20 1347


   Last Action: Reviewed


Spironolactone (Spironolactone) 50 Mg Tablet, 50 MG PO DAILY, (Reported)


   Entered as Reported by: CARLY LOPEZ on 22 113


   Last Action: Reviewed


Sulfamethoxazole/Trimethoprim (Bactrim Ds Tablet) 1 Each Tablet, 1 EA PO BID, 

(Reported)


   Entered as Reported by: CARLY LOPEZ on 22 113


   Last Action: Reviewed


Vitamin B Complex (Vitamin B Complex) 1 Each Capsule, 1 EACH PO DAILY, 

(Reported)


   Entered as Reported by: JUDE WINKLER on 20 1347


   Last Action: Reviewed





Physical Exam-Cardiology


Physical Exam


Vital Signs/I&O











 22





 21:00 22:00 22:39 22:44


 


Temp    36.5


 


Pulse 116 112  


 


Resp 19 18  


 


B/P (MAP) 99/64 97/60 103/62 


 


Pulse Ox 100 100  


 


O2 Delivery Room Air Room Air  


 


    





 5/2/22 5/3/22 5/3/22 5/3/22





 23:00 00:00 00:00 01:00


 


Pulse 105  101 107


 


Resp 19  19 


 


B/P (MAP) 94/62  92/61 


 


Pulse Ox 91 100 98 


 


O2 Delivery Room Air Room Air Room Air 





 5/3/22 5/3/22 5/3/22 5/3/22





 01:00 02:00 03:00 04:00


 


Pulse 107 99 100 


 


Resp 17 17 17 


 


B/P (MAP) 95/56 88/52 98/55 


 


Pulse Ox 99 100 96 98


 


O2 Delivery Room Air Room Air Room Air Room Air





 5/3/22 5/3/22 5/3/22 5/3/22





 04:00 05:00 06:00 07:00


 


Pulse 112 98 98 102


 


Resp 12 15 15 


 


B/P (MAP) 93/58 92/55 85/54 


 


Pulse Ox 100 96 94 


 


O2 Delivery Room Air Room Air Room Air 





 5/3/22 5/3/22  





 07:27 08:00  


 


Temp 36.2   


 


Pulse Ox  97  


 


O2 Delivery  Room Air  














 5/3/22





 00:00


 


Intake Total 330 ml


 


Output Total 2175 ml


 


Balance -1845 ml





Capillary Refill : Less Than 3 Seconds


Rectal:  deferred


Lymphatic:  no adenopathy (Head and neck)





Data Review


Labs


Laboratory Tests


22 12:22: Glucometer 178H


22 17:49: Glucometer 147H


5/3/22 00:22: Glucometer 131H


5/3/22 04:05: 


White Blood Count 8.2, Red Blood Count 2.13L, Hemoglobin 8.9L, Hematocrit 25L, 

Mean Corpuscular Volume 117H, Mean Corpuscular Hemoglobin 42H, Mean Corpuscular 

Hemoglobin Concent 36, Red Cell Distribution Width 14.6H, Platelet Count 28*L, 

Mean Platelet Volume 12.6H, Immature Granulocyte % (Auto) 1, Neutrophils (%) 

(Auto) 65, Lymphocytes (%) (Auto) 20, Monocytes (%) (Auto) 14H, Eosinophils (%) 

(Auto) 1, Basophils (%) (Auto) 0, Neutrophils # (Auto) 5.3, Lymphocytes # (Auto)

1.6, Monocytes # (Auto) 1.2H, Eosinophils # (Auto) 0.1, Basophils # (Auto) 0.0, 

Immature Granulocyte # (Auto) 0.0, Sodium Level 140, Potassium Level 3.4L, 

Chloride Level 103, Carbon Dioxide Level 25, Anion Gap 12, Blood Urea Nitrogen 

70H, Creatinine 2.28H, Estimat Glomerular Filtration Rate 28, BUN/Creatinine 

Ratio 31, Glucose Level 127H, Calcium Level 7.0L, Corrected Calcium 8.7, 

Phosphorus Level 4.1, Magnesium Level 2.0, Total Bilirubin 2.2H, Aspartate Amino

Transf (AST/SGOT) 76H, Alanine Aminotransferase (ALT/SGPT) 49, Alkaline 

Phosphatase 98, Total Protein 4.4L, Albumin 1.9L





Microbiology


22 MRSA Screen - Final, Complete


          MRSA not isolated


22 Gram Stain - Final, Complete


          


22 Wound Culture - Final, Complete


          Mixed Bacterial Marcella


          Enterococcus species


22 Blood Culture - Final, Complete


          No growth








Radiology


NAME:   INDIRA BENSON


MED REC#:   [id number]


ACCOUNT#:   [id number]


PT STATUS:   ADM IN


:   1940


PHYSICIAN:   MAGDALENE VALADEZ MD


ADMIT DATE:   22/ICU


***Signed***


Date of Exam:22





CHEST 1 VIEW, AP/PA ONLY








INDICATION: PICC line placement.





TIME OF EXAM: 05:18 p.m.





COMPARISON: Correlation is made to prior chest from 2022.





FINDINGS: Heart size is stable. Lungs are clear. No infiltrates


are seen. There is no effusion or pneumothorax. Right upper


extremity PICC line has tip in good position overlying the lower


SVC.





IMPRESSION: Satisfactory PICC line placement.





Dictated by: 





  Dictated on workstation # [id number]








Dict:   [id number]


Trans:   [id number]


[id number]





Interpreted by:     RIKA MILLER MD


Electronically signed by: RIKA MILLER MD [id number]





A/P-Cardiology


Assessment/Admission Diagnosis


CAD


- Cardiac cath of 3-1-16 (following a 3-beat run of WCT on 16) showed angio

graphically mild CAD. Normal global LV systolic function with an LVEF of approx 

60%. Normal LVEDP. No significant MR.


- MPI of 19 showed no ischemia or infarction and EF 72%


- Echo on 19: LVEF 60-65%, grade 1 hunter dysfunction, mild MR< mild TR, RVSP

30 mmHg





H/o calcific densities in the pulmonary fields for which we states he been 

following with his PCP.


- States he has h/o histoplasmosis





Labile hypertension


- relative hypotension on office visith of 3/23/22





Carotid dz


- Carotid u/s on 21: minimal plaque





Elevated liver enzymes and elevated bilirubin


- likely related to chronic heavy alcohol use, followed by his PCP


- Liver and gallbladder u/s of 16 showed gall bladder sludge and hepatic 

steatosis





Macrocytic indices


- etiology undetermined, followed by PCP





Hyperlipidemia


- Not suitable for statin therapy due to chronic liver enz elev





Fam h/o early CAD





Mild to mod, bilateral leg edema, likely due to venous insuff











VANESSA MELGOZA [facility]           2022 08:59

## 2022-04-29 NOTE — DIAGNOSTIC IMAGING REPORT
INDICATION: Ascites.



TECHNIQUE: Interrogation of all four quadrants was performed.



FINDINGS: Right upper quadrant is unremarkable. There is moderate

fluid in the right lower quadrant. The left upper quadrant is

unremarkable. Small-to-moderate amount of fluid in the left lower

quadrant is noted. Areas were marked for Dr. Hastings for performance

of paracentesis.



IMPRESSION: Right lower quadrant and left lower quadrant ascites.



Dictated by: 



  Dictated on workstation # [id number]

## 2022-04-30 LAB
ALBUMIN SERPL-MCNC: 2.2 GM/DL (ref 3.2–4.5)
ALP SERPL-CCNC: 111 U/L (ref 40–136)
ALT SERPL-CCNC: 64 U/L (ref 0–55)
BASOPHILS # BLD AUTO: 0.1 10^3/UL (ref 0–0.1)
BASOPHILS NFR BLD AUTO: 0 % (ref 0–10)
BILIRUB SERPL-MCNC: 2.5 MG/DL (ref 0.1–1)
BUN/CREAT SERPL: 19
CALCIUM SERPL-MCNC: 7.2 MG/DL (ref 8.5–10.1)
CHLORIDE SERPL-SCNC: 111 MMOL/L (ref 98–107)
CO2 SERPL-SCNC: 16 MMOL/L (ref 21–32)
CREAT SERPL-MCNC: 2.34 MG/DL (ref 0.6–1.3)
EOSINOPHIL # BLD AUTO: 0.1 10^3/UL (ref 0–0.3)
EOSINOPHIL NFR BLD AUTO: 1 % (ref 0–10)
GFR SERPLBLD BASED ON 1.73 SQ M-ARVRAT: 28 ML/MIN
GLUCOSE SERPL-MCNC: 161 MG/DL (ref 70–105)
HCT VFR BLD CALC: 28 % (ref 40–54)
HGB BLD-MCNC: 10.1 G/DL (ref 13.3–17.7)
LYMPHOCYTES # BLD AUTO: 1.7 10^3/UL (ref 1–4)
LYMPHOCYTES NFR BLD AUTO: 11 % (ref 12–44)
MAGNESIUM SERPL-MCNC: 1.9 MG/DL (ref 1.6–2.4)
MANUAL DIFFERENTIAL PERFORMED BLD QL: NO
MCH RBC QN AUTO: 42 PG (ref 25–34)
MCHC RBC AUTO-ENTMCNC: 36 G/DL (ref 32–36)
MCV RBC AUTO: 118 FL (ref 80–99)
MONOCYTES # BLD AUTO: 2.2 10^3/UL (ref 0–1)
MONOCYTES NFR BLD AUTO: 14 % (ref 0–12)
NEUTROPHILS # BLD AUTO: 11.9 10^3/UL (ref 1.8–7.8)
NEUTROPHILS NFR BLD AUTO: 74 % (ref 42–75)
PHOSPHATE SERPL-MCNC: 3.7 MG/DL (ref 2.3–4.7)
PLATELET # BLD: 30 10^3/UL (ref 130–400)
PMV BLD AUTO: 11.8 FL (ref 9–12.2)
POTASSIUM SERPL-SCNC: 4.4 MMOL/L (ref 3.6–5)
PROT SERPL-MCNC: 4.6 GM/DL (ref 6.4–8.2)
SODIUM SERPL-SCNC: 140 MMOL/L (ref 135–145)
WBC # BLD AUTO: 16 10^3/UL (ref 4.3–11)

## 2022-04-30 PROCEDURE — 0W9G30Z DRAINAGE OF PERITONEAL CAVITY WITH DRAINAGE DEVICE, PERCUTANEOUS APPROACH: ICD-10-PCS | Performed by: SURGERY

## 2022-04-30 RX ADMIN — MIDODRINE HYDROCHLORIDE SCH MG: 10 TABLET ORAL at 13:09

## 2022-04-30 RX ADMIN — VASOPRESSIN SCH MLS/HR: 20 INJECTION INTRAVENOUS at 16:08

## 2022-04-30 RX ADMIN — SODIUM CHLORIDE SCH MLS/HR: 4.5 INJECTION, SOLUTION INTRAVENOUS at 16:13

## 2022-04-30 RX ADMIN — FOLIC ACID SCH MG: 1 TABLET ORAL at 08:15

## 2022-04-30 RX ADMIN — NOREPINEPHRINE BITARTRATE SCH MLS/HR: 1 INJECTION, SOLUTION, CONCENTRATE INTRAVENOUS at 03:36

## 2022-04-30 RX ADMIN — MIDODRINE HYDROCHLORIDE SCH MG: 10 TABLET ORAL at 08:15

## 2022-04-30 RX ADMIN — NOREPINEPHRINE BITARTRATE SCH MLS/HR: 1 INJECTION, SOLUTION, CONCENTRATE INTRAVENOUS at 16:06

## 2022-04-30 RX ADMIN — Medication SCH MG: at 08:16

## 2022-04-30 RX ADMIN — NOREPINEPHRINE BITARTRATE SCH MLS/HR: 1 INJECTION, SOLUTION, CONCENTRATE INTRAVENOUS at 00:03

## 2022-04-30 RX ADMIN — LORAZEPAM PRN MG: 2 INJECTION INTRAMUSCULAR; INTRAVENOUS at 03:03

## 2022-04-30 RX ADMIN — AMPICILLIN SODIUM AND SULBACTAM SODIUM SCH MLS/HR: 1; .5 INJECTION, POWDER, FOR SOLUTION INTRAMUSCULAR; INTRAVENOUS at 05:58

## 2022-04-30 RX ADMIN — MIDODRINE HYDROCHLORIDE SCH MG: 10 TABLET ORAL at 17:20

## 2022-04-30 RX ADMIN — SODIUM CHLORIDE SCH MLS/HR: 4.5 INJECTION, SOLUTION INTRAVENOUS at 08:15

## 2022-04-30 RX ADMIN — VASOPRESSIN SCH MLS/HR: 20 INJECTION INTRAVENOUS at 06:29

## 2022-04-30 RX ADMIN — NOREPINEPHRINE BITARTRATE SCH MLS/HR: 1 INJECTION, SOLUTION, CONCENTRATE INTRAVENOUS at 08:16

## 2022-04-30 NOTE — OPERATIVE REPORT
DATE OF SERVICE:  04/30/2022



PREOPERATIVE DIAGNOSIS:

Recurrent ascites with history of liver cirrhosis.



POSTOPERATIVE DIAGNOSIS:

Recurrent ascites with history of liver cirrhosis.



PROCEDURE PERFORMED:

Paracentesis.



SURGEON:

Brenda Welch MD.



ANESTHESIA:

Local.



ESTIMATED BLOOD LOSS:

Minimal.



FINDINGS:

Straw yellow transudative fluid.



DISPOSITION:

The patient tolerated the procedure well.



INDICATIONS FOR PROCEDURE:

The patient is a 81-year-old male known to us.  He had symptomatic gallstones;

however, before this, he had undergone a paracentesis, which was felt to be

cryptogenic at that time.  He then underwent a laparoscopic cholecystectomy and

was also found to have significant liver cirrhosis.  Since that time, his

overall health has declined and he continues to have recurrent symptomatic

ascites.



DESCRIPTION OF PROCEDURE:

Before the procedure, an ultrasound was performed and marked in the left lower

abdominal quadrant.  The area was then prepped and draped in a standard surgical

fashion.  A 1% lidocaine was then used to anesthetize the skin, subcutaneous

tissue, muscle layers as well as the peritoneal lining.  A vertical skin

incision was then made using an 11 blade and the trocar and sheath were then

introduced withdrawing a straw yellow transudative fluid and the catheter was

advanced over the trocar without any resistance.  The catheter was then

connected to tubing and a gravity drainage bag.  Catheter was then cleaned and

covered with gauze followed by Op-Site.



The patient tolerated the procedure well.  We will continue with decompression

for the next several days, then remove the catheter.





Job ID: [id number]

DocumentID: [id number]

Dictated Date:  04/30/2022 11:06:30

Transcription Date: 04/30/2022 18:51:40

Dictated By: BRENDA WELCH MD

## 2022-04-30 NOTE — TELE-ICU PROGRESS NOTE
Progress Note


video rounds completed





80 y/o male with cirrhosis and s/p recent lap choly


Admitted for drainage from incision, trevon ascites





Has developed decompensated cirrhosis with rising T . Bili and transaminases.





Likely has hepatorenal syndrome with rising creatinine.





Now made DNR.





PE: pulse: 128 Sinus tach


BP: 95/65 while on levophed and vasopressin





Plts: 30,000





PLAN: continue suportive care





Focused Exam


Lactate Level


4/30/22 04:45: Lactic Acid Level 1.44


Height, Weight, BMI


Height: 5'9.00"


Weight: 179lbs. 0.0oz. 81.609919fl; 32.72 BMI


Method:


Laboratory Tests


4/30/22 04:45








Labs


Labs


Laboratory Tests


4/29/22 13:18: Glucometer 154H


4/29/22 15:00: 


Prothrombin Time 22.4H, INR Comment 1.9H, Activated Partial Thromboplast Time 

46H, Fibrinogen 147L, D-Dimer 18.14H


4/29/22 17:38: Glucometer 150H


4/30/22 04:45: 


White Blood Count 16.0H, Red Blood Count 2.40L, Hemoglobin 10.1L, Hematocrit 28L

, Mean Corpuscular Volume 118H, Mean Corpuscular Hemoglobin 42H, Mean Sarah

uscular Hemoglobin Concent 36, Red Cell Distribution Width 14.3, Platelet Count 

30*L, Mean Platelet Volume 11.8, Immature Granulocyte % (Auto) 1, Neutrophils 

(%) (Auto) 74, Lymphocytes (%) (Auto) 11L, Monocytes (%) (Auto) 14H, Eosinophils

(%) (Auto) 1, Basophils (%) (Auto) 0, Neutrophils # (Auto) 11.9H, Lymphocytes # 

(Auto) 1.7, Monocytes # (Auto) 2.2H, Eosinophils # (Auto) 0.1, Basophils # (

Auto) 0.1, Immature Granulocyte # (Auto) 0.1, Percent Immature Platelet Fraction

7.6, Sodium Level 140, Potassium Level 4.4, Chloride Level 111H, Carbon Dioxide 

Level 16L, Anion Gap 13, Blood Urea Nitrogen 45H, Creatinine 2.34H, Estimat 

Glomerular Filtration Rate 28, BUN/Creatinine Ratio 19, Glucose Level 161H, 

Lactic Acid Level 1.44, Calcium Level 7.2L, Corrected Calcium 8.6, Phosphorus 

Level 3.7, Magnesium Level 1.9, Total Bilirubin 2.5H, Aspartate Amino Transf 

(AST/SGOT) 79H, Alanine Aminotransferase (ALT/SGPT) 64H, Alkaline Phosphatase 

111, Total Protein 4.6L, Albumin 2.2L


4/30/22 10:32: Glucometer 145H





Microbiology


4/23/22 MRSA Screen - Final, Complete


          MRSA not isolated


4/23/22 Gram Stain - Final, Complete


          


4/23/22 Wound Culture - Final, Complete


          Mixed Bacterial Marcella


          Enterococcus species


4/23/22 Blood Culture - Final, Complete


          No growth


Lab results:





Laboratory Tests








Test


 4/29/22


13:18 4/29/22


15:00 4/29/22


17:38 4/30/22


04:45 Range/Units


 


 


Glucometer 154 H  150 H    MG/DL


 


Prothrombin Time  22.4 H   12.2-14.7  SEC


 


INR Comment  1.9 H   0.8-1.4  


 


Activated Partial


Thromboplast Time 


 46 H


 


 


 24-35  SEC





 


Fibrinogen  147 L   221-496  MG/DL


 


D-Dimer


 


 18.14 H


 


 


 0.00-0.49


UG/ML


 


White Blood Count


 


 


 


 16.0 H


 4.3-11.0


10^3/uL


 


Red Blood Count


 


 


 


 2.40 L


 4.30-5.52


10^6/uL


 


Hemoglobin    10.1 L 13.3-17.7  g/dL


 


Hematocrit    28 L 40-54  %


 


Mean Corpuscular Volume    118 H 80-99  fL


 


Mean Corpuscular Hemoglobin    42 H 25-34  pg


 


Mean Corpuscular Hemoglobin


Concent 


 


 


 36 


 32-36  g/dL





 


Red Cell Distribution Width    14.3  10.0-14.5  %


 


Platelet Count


 


 


 


 30 *L


 130-400


10^3/uL


 


Mean Platelet Volume    11.8  9.0-12.2  fL


 


Immature Granulocyte % (Auto)    1   %


 


Neutrophils (%) (Auto)    74  42-75  %


 


Lymphocytes (%) (Auto)    11 L 12-44  %


 


Monocytes (%) (Auto)    14 H 0-12  %


 


Eosinophils (%) (Auto)    1  0-10  %


 


Basophils (%) (Auto)    0  0-10  %


 


Neutrophils # (Auto)


 


 


 


 11.9 H


 1.8-7.8


10^3/uL


 


Lymphocytes # (Auto)


 


 


 


 1.7 


 1.0-4.0


10^3/uL


 


Monocytes # (Auto)


 


 


 


 2.2 H


 0.0-1.0


10^3/uL


 


Eosinophils # (Auto)


 


 


 


 0.1 


 0.0-0.3


10^3/uL


 


Basophils # (Auto)


 


 


 


 0.1 


 0.0-0.1


10^3/uL


 


Immature Granulocyte # (Auto)


 


 


 


 0.1 


 0.0-0.1


10^3/uL


 


Percent Immature Platelet


Fraction 


 


 


 7.6 


 0.0-7.6  %





 


Sodium Level    140  135-145  MMOL/L


 


Potassium Level    4.4  3.6-5.0  MMOL/L


 


Chloride Level    111 H   MMOL/L


 


Carbon Dioxide Level    16 L 21-32  MMOL/L


 


Anion Gap    13  5-14  MMOL/L


 


Blood Urea Nitrogen    45 H 7-18  MG/DL


 


Creatinine


 


 


 


 2.34 H


 0.60-1.30


MG/DL


 


Estimat Glomerular Filtration


Rate 


 


 


 28 


  





 


BUN/Creatinine Ratio    19   


 


Glucose Level    161 H   MG/DL


 


Lactic Acid Level


 


 


 


 1.44 


 0.50-2.00


MMOL/L


 


Calcium Level    7.2 L 8.5-10.1  MG/DL


 


Corrected Calcium    8.6  8.5-10.1  MG/DL


 


Phosphorus Level    3.7  2.3-4.7  MG/DL


 


Magnesium Level    1.9  1.6-2.4  MG/DL


 


Total Bilirubin    2.5 H 0.1-1.0  MG/DL


 


Aspartate Amino Transf


(AST/SGOT) 


 


 


 79 H


 5-34  U/L





 


Alanine Aminotransferase


(ALT/SGPT) 


 


 


 64 H


 0-55  U/L





 


Alkaline Phosphatase    111    U/L


 


Total Protein    4.6 L 6.4-8.2  GM/DL


 


Albumin    2.2 L 3.2-4.5  GM/DL


 


Test


 4/30/22


10:32 


 


 


 Range/Units


 


 


Glucometer 145 H      MG/DL

















CHARAN RUIZ MD            Apr 30, 2022 10:40

## 2022-04-30 NOTE — PROGRESS NOTE
Subjective


Date Seen by a Provider:  Apr 30, 2022


Time Seen by a Provider:  10:00


Subjective/Events-last exam


patient status unchanged. somnolent and confused. CT and US showed recurrent 

ascites.





Focused Exam


Lactate Level


4/30/22 04:45: Lactic Acid Level 1.44





Objective


Exam





Vital Signs








  Date Time  Temp Pulse Resp B/P (MAP) Pulse Ox O2 Delivery O2 Flow Rate FiO2


 


4/30/22 09:00  144 19 124/74 90 Nasal Cannula 3.00 


 


4/30/22 08:22 36.0       


 


4/30/22 08:00  133 18 107/77 99 Nasal Cannula 3.00 


 


4/30/22 08:00     96 Nasal Cannula 3.00 


 


4/30/22 07:00  156 19 91/51 98 Nasal Cannula 3.00 


 


4/30/22 07:00  149      


 


4/30/22 06:29  156  92/60    


 


4/30/22 06:00  156 18 92/60 98 Nasal Cannula 3.00 


 


4/30/22 05:00  151 18 96/57 97 Nasal Cannula 3.00 


 


4/30/22 04:02 36.8       


 


4/30/22 04:00     95 Nasal Cannula 3.00 


 


4/30/22 04:00  154 20 89/76 97 Nasal Cannula 3.00 


 


4/30/22 03:00  172 23 100/75 96 Nasal Cannula 3.00 


 


4/30/22 02:00  181 25 105/60 95 Nasal Cannula 3.00 


 


4/30/22 01:00  168 25 92/58 97 Nasal Cannula 3.00 


 


4/30/22 01:00  168      


 


4/30/22 00:00 36.3       


 


4/30/22 00:00  158 18 90/64 95 Nasal Cannula 3.00 


 


4/29/22 23:59     95 Nasal Cannula 3.00 


 


4/29/22 23:00  160 21 95/64 96 Nasal Cannula 3.00 


 


4/29/22 22:34 36.1       


 


4/29/22 22:00  118 27 82/56 91 Nasal Cannula 3.00 


 


4/29/22 21:00  146 22 83/73 100 Nasal Cannula 3.00 


 


4/29/22 20:34 36.9       


 


4/29/22 20:00     95 Nasal Cannula 3.00 


 


4/29/22 20:00  152 23 107/87 97 Nasal Cannula 3.00 


 


4/29/22 19:00  152 25 101/64 95 Nasal Cannula 3.00 


 


4/29/22 19:00  166      


 


4/29/22 18:00  165 25 99/65 96 Nasal Cannula 3.00 


 


4/29/22 17:00  154 26 89/74 91 Nasal Cannula 3.00 


 


4/29/22 16:00     95 Nasal Cannula 3.00 


 


4/29/22 16:00  154 20 90/78 90 Nasal Cannula 3.00 


 


4/29/22 15:25 36.3       


 


4/29/22 15:00  149 27 100/71 95 Nasal Cannula 3.00 


 


4/29/22 14:00  152 22 108/55 99 Nasal Cannula 3.00 


 


4/29/22 13:00  149 22 90/68 98 Nasal Cannula 3.00 


 


4/29/22 12:43    102/81    


 


4/29/22 12:36  148      


 


4/29/22 12:00  142 19 90/65 98 Nasal Cannula 3.00 


 


4/29/22 12:00 36.2       


 


4/29/22 12:00     95 Nasal Cannula 3.00 


 


4/29/22 11:00  154 29 84/68 95 Nasal Cannula 3.00 














I & O 


 


 4/30/22





 07:00


 


Intake Total 950.2 ml


 


Output Total 270 ml


 


Balance 680.2 ml





Capillary Refill : Less Than 3 Seconds


General Appearance:  No Apparent Distress


HEENT:  PERRL/EOMI


Neck:  Full Range of Motion


Respiratory:  Normal Breath Sounds


Cardiovascular:  Regular Rate, Rhythm


Gastrointestinal:  soft, distended


Extremity:  Normal Capillary Refill


Neurologic/Psychiatric:  Alert, Disoriented


Skin:  Normal Color


Lymphatic:  No Adenopathy





Results


Lab


Laboratory Tests


4/29/22 13:18: Glucometer 154H


4/29/22 15:00: 


Prothrombin Time 22.4H, INR Comment 1.9H, Activated Partial Thromboplast Time 

46H, Fibrinogen 147L, D-Dimer 18.14H


4/29/22 17:38: Glucometer 150H


4/30/22 04:45: 


White Blood Count 16.0H, Red Blood Count 2.40L, Hemoglobin 10.1L, Hematocrit 28L

, Mean Corpuscular Volume 118H, Mean Corpuscular Hemoglobin 42H, Mean 

Corpuscular Hemoglobin Concent 36, Red Cell Distribution Width 14.3, Platelet 

Count 30*L, Mean Platelet Volume 11.8, Immature Granulocyte % (Auto) 1, 

Neutrophils (%) (Auto) 74, Lymphocytes (%) (Auto) 11L, Monocytes (%) (Auto) 14H,

Eosinophils (%) (Auto) 1, Basophils (%) (Auto) 0, Neutrophils # (Auto) 11.9H, 

Lymphocytes # (Auto) 1.7, Monocytes # (Auto) 2.2H, Eosinophils # (Auto) 0.1, 

Basophils # (Auto) 0.1, Immature Granulocyte # (Auto) 0.1, Percent Immature 

Platelet Fraction 7.6, Sodium Level 140, Potassium Level 4.4, Chloride Level 

111H, Carbon Dioxide Level 16L, Anion Gap 13, Blood Urea Nitrogen 45H, 

Creatinine 2.34H, Estimat Glomerular Filtration Rate 28, BUN/Creatinine Ratio 

19, Glucose Level 161H, Lactic Acid Level 1.44, Calcium Level 7.2L, Corrected 

Calcium 8.6, Phosphorus Level 3.7, Magnesium Level 1.9, Total Bilirubin 2.5H, 

Aspartate Amino Transf (AST/SGOT) 79H, Alanine Aminotransferase (ALT/SGPT) 64H, 

Alkaline Phosphatase 111, Total Protein 4.6L, Albumin 2.2L


4/30/22 10:32: Glucometer 145H





Microbiology


4/23/22 MRSA Screen - Final, Complete


          MRSA not isolated


4/23/22 Gram Stain - Final, Complete


          


4/23/22 Wound Culture - Final, Complete


          Mixed Bacterial Marcella


          Enterococcus species


4/23/22 Blood Culture - Final, Complete


          No growth





Assessment/Plan


Assessment/Plan


Assess & Plan/Chief Complaint


liver cirrhosis s/p lap cholecystectomy 4/7/22.


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


will proceed with placement interrupted figure of 8 sutures across umbilical 

wound. 


low protein diet.


Na and fluid restriction.


cont spironolactone. 


continues to be hypotensive and tachycardic. will get CT chest/abd/pelvis.


will proceed with paracentesis.











BRENDA WELCH MD                Apr 30, 2022 11:02

## 2022-04-30 NOTE — PHYSICAL THERAPY PROGRESS NOTE
Therapy Progress Note


Patient is still on hold per nursing. Will continue to follow.











KHOA MUÑOZ PT            Apr 30, 2022 09:48

## 2022-04-30 NOTE — PROGRESS NOTE - HOSPITALIST
Subjective


HPI/CC On Admission


Date Seen by Provider:  Apr 30, 2022


Time Seen by Provider:  09:40


Patient is 81-year-old  male past medical history of cirrhosis, 

hypertension, and recent cholecystectomy who presented to the emergency room due

to drainage from surgical wounds and low blood pressure.  He is a very poor 

historian but per the ER note he also complained of nausea and decreased 

appetite.  He told me his surgery was 2 years ago though records reveal it was 

much more recent than that (4/7).  He does not complain of any of this to me and

states he is feeling very well.  When asked why he came to the hospital then if 

he was feeling well he states because his sister is a nurse.  Reviewing records 

reveals he was seen in the emergency department last week for drainage from 

surgical incisions and a suture was placed.  He saw Dr. WELCH's nurse 

practitioner on April 20 for removal.  Since then he has continued to have 

drainage.  He also continues to drink 1-2 glasses of wine a day.


Subjective/Events-last exam


He has no complaints or concerns. There is no family at the bedside at this time

.





Focused Exam


Lactate Level


4/30/22 04:45: Lactic Acid Level 1.44








Objective


Exam


Vital Signs





Vital Signs








  Date Time  Temp Pulse Resp B/P (MAP) Pulse Ox O2 Delivery O2 Flow Rate FiO2


 


4/30/22 20:25 36.3       


 


4/30/22 20:00     100 Nasal Cannula 3.00 


 


4/30/22 18:00  141 19 105/59    





Capillary Refill : Less Than 3 Seconds


General Appearance:  No Apparent Distress, Chronically ill, Obese


Respiratory:  Lungs Clear, No Respiratory Distress


Cardiovascular:  No Murmur, Tachycardia


Gastrointestinal:  Normal Bowel Sounds, Non Tender, Distended


Extremity:  Normal Inspection, Pedal Edema


Neurologic/Psychiatric:  Alert, Normal Mood/Affect


Skin:  Warm/Dry, Jaundice





Results/Procedures


Lab


Laboratory Tests


4/30/22 04:45








Patient resulted labs reviewed.


Imaging:  Reviewed Imaging Report





Assessment/Plan


Assessment and Plan


Assess & Plan/Chief Complaint


Septic shock


Oliguric RITA


Recent cholecystectomy


Possible SBP


Thrombocytopenia


DIC


End stage liver disease


Decompensated cirrhosis


Poor prognosis


Goals of care discussion


   Cultures with Enterococcus


   CT chest/abdomen with moderate ascites


   Continue Unasyn


   Continue fluids with Bicarb


   Continue pressors


   Continue midodrine


   Paracentesis today


   TeleICU following


   Surgery following


   Cardiology following


   Palliative care following





DVT ppx: SCDs





Critical Care


Critically Ill Patient





Diagnosis/Problems


Diagnosis/Problems





(1) Septic shock


Status:  Acute


(2) DIC (disseminated intravascular coagulation)


Status:  Acute


(3) Severe sepsis


Status:  Acute


(4) Abdominal infection


Status:  Acute


(5) S/P cholecystectomy


Status:  Chronic


(6) End-stage liver disease


Status:  Acute


(7) Poor prognosis


Status:  Acute


(8) Hypotension


Status:  Acute











MAGDALENE VALADEZ MD              Apr 30, 2022 19:19

## 2022-05-01 LAB
ALBUMIN SERPL-MCNC: 2 GM/DL (ref 3.2–4.5)
ALP SERPL-CCNC: 105 U/L (ref 40–136)
ALT SERPL-CCNC: 53 U/L (ref 0–55)
BASOPHILS # BLD AUTO: 0 10^3/UL (ref 0–0.1)
BASOPHILS NFR BLD AUTO: 0 % (ref 0–10)
BILIRUB SERPL-MCNC: 2.1 MG/DL (ref 0.1–1)
BUN/CREAT SERPL: 23
CALCIUM SERPL-MCNC: 7.2 MG/DL (ref 8.5–10.1)
CHLORIDE SERPL-SCNC: 109 MMOL/L (ref 98–107)
CO2 SERPL-SCNC: 20 MMOL/L (ref 21–32)
CREAT SERPL-MCNC: 2.7 MG/DL (ref 0.6–1.3)
EOSINOPHIL # BLD AUTO: 0.1 10^3/UL (ref 0–0.3)
EOSINOPHIL NFR BLD AUTO: 1 % (ref 0–10)
GFR SERPLBLD BASED ON 1.73 SQ M-ARVRAT: 23 ML/MIN
GLUCOSE SERPL-MCNC: 151 MG/DL (ref 70–105)
HCT VFR BLD CALC: 26 % (ref 40–54)
HGB BLD-MCNC: 9 G/DL (ref 13.3–17.7)
LYMPHOCYTES # BLD AUTO: 1.5 10^3/UL (ref 1–4)
LYMPHOCYTES NFR BLD AUTO: 16 % (ref 12–44)
MAGNESIUM SERPL-MCNC: 1.9 MG/DL (ref 1.6–2.4)
MANUAL DIFFERENTIAL PERFORMED BLD QL: NO
MCH RBC QN AUTO: 42 PG (ref 25–34)
MCHC RBC AUTO-ENTMCNC: 35 G/DL (ref 32–36)
MCV RBC AUTO: 118 FL (ref 80–99)
MONOCYTES # BLD AUTO: 1.3 10^3/UL (ref 0–1)
MONOCYTES NFR BLD AUTO: 13 % (ref 0–12)
NEUTROPHILS # BLD AUTO: 6.7 10^3/UL (ref 1.8–7.8)
NEUTROPHILS NFR BLD AUTO: 69 % (ref 42–75)
PHOSPHATE SERPL-MCNC: 3.2 MG/DL (ref 2.3–4.7)
PLATELET # BLD: 28 10^3/UL (ref 130–400)
PMV BLD AUTO: 12.2 FL (ref 9–12.2)
POTASSIUM SERPL-SCNC: 4 MMOL/L (ref 3.6–5)
PROT SERPL-MCNC: 4.3 GM/DL (ref 6.4–8.2)
SODIUM SERPL-SCNC: 140 MMOL/L (ref 135–145)
WBC # BLD AUTO: 9.6 10^3/UL (ref 4.3–11)

## 2022-05-01 RX ADMIN — VASOPRESSIN SCH MLS/HR: 20 INJECTION INTRAVENOUS at 14:00

## 2022-05-01 RX ADMIN — FOLIC ACID SCH MG: 1 TABLET ORAL at 08:23

## 2022-05-01 RX ADMIN — SODIUM CHLORIDE SCH MLS/HR: 4.5 INJECTION, SOLUTION INTRAVENOUS at 06:16

## 2022-05-01 RX ADMIN — NOREPINEPHRINE BITARTRATE SCH MLS/HR: 1 INJECTION, SOLUTION, CONCENTRATE INTRAVENOUS at 01:32

## 2022-05-01 RX ADMIN — MIDODRINE HYDROCHLORIDE SCH MG: 10 TABLET ORAL at 08:23

## 2022-05-01 RX ADMIN — MIDODRINE HYDROCHLORIDE SCH MG: 10 TABLET ORAL at 13:56

## 2022-05-01 RX ADMIN — PANTOPRAZOLE SODIUM SCH MG: 40 INJECTION, POWDER, FOR SOLUTION INTRAVENOUS at 20:25

## 2022-05-01 RX ADMIN — Medication SCH MG: at 08:23

## 2022-05-01 RX ADMIN — VASOPRESSIN SCH MLS/HR: 20 INJECTION INTRAVENOUS at 04:51

## 2022-05-01 RX ADMIN — MIDODRINE HYDROCHLORIDE SCH MG: 10 TABLET ORAL at 17:11

## 2022-05-01 RX ADMIN — SODIUM CHLORIDE SCH MLS/HR: 4.5 INJECTION, SOLUTION INTRAVENOUS at 17:11

## 2022-05-01 NOTE — PROGRESS NOTE - HOSPITALIST
Subjective


HPI/CC On Admission


Date Seen by Provider:  May 1, 2022


Time Seen by Provider:  09:30


Patient is 81-year-old  male past medical history of cirrhosis, 

hypertension, and recent cholecystectomy who presented to the emergency room due

to drainage from surgical wounds and low blood pressure.  He is a very poor 

historian but per the ER note he also complained of nausea and decreased 

appetite.  He told me his surgery was 2 years ago though records reveal it was 

much more recent than that (4/7).  He does not complain of any of this to me and

states he is feeling very well.  When asked why he came to the hospital then if 

he was feeling well he states because his sister is a nurse.  Reviewing records 

reveals he was seen in the emergency department last week for drainage from 

surgical incisions and a suture was placed.  He saw Dr. WELCH's nurse 

practitioner on April 20 for removal.  Since then he has continued to have 

drainage.  He also continues to drink 1-2 glasses of wine a day.


Subjective/Events-last exam


He has no complaints. He wants to go home. His wife is at the bedside and is 

updated.





Focused Exam


Lactate Level


4/30/22 04:45: Lactic Acid Level 1.44








Objective


Exam


Vital Signs





Vital Signs








  Date Time  Temp Pulse Resp B/P (MAP) Pulse Ox O2 Delivery O2 Flow Rate FiO2


 


5/1/22 18:00  116 14 77/52 100 Nasal Cannula 3.00 


 


5/1/22 11:59 36.0       





Capillary Refill : Less Than 3 Seconds


General Appearance:  No Apparent Distress, Chronically ill, Obese


Respiratory:  Lungs Clear, No Respiratory Distress


Cardiovascular:  No Murmur, Tachycardia


Gastrointestinal:  Normal Bowel Sounds, Soft, Distended


Extremity:  Normal Inspection, Pedal Edema


Neurologic/Psychiatric:  Alert, No Motor/Sensory Deficits


Skin:  Normal Color, Warm/Dry





Results/Procedures


Lab


Laboratory Tests


5/1/22 05:05








Patient resulted labs reviewed.


Imaging:  Reviewed Imaging Report





Assessment/Plan


Assessment and Plan


Assess & Plan/Chief Complaint


Septic shock


Oliguric RITA


Hepatorenal syndrome


Recent cholecystectomy


Possible SBP


Thrombocytopenia


DIC


Melena


End stage liver disease


Decompensated cirrhosis


Poor prognosis


Goals of care discussion


   Cultures with Enterococcus


   CT chest/abdomen with moderate ascites


   Continue Unasyn


   Continue fluids with Bicarb


   Continue pressors


   Continue midodrine


   Peritoneal catheter in place, draining


   IV PPI BID


   TeleICU following


   Surgery following


   Cardiology following


   Palliative care following





DVT ppx: SCDs





Critical Care


Critically Ill Patient





Diagnosis/Problems


Diagnosis/Problems





(1) Septic shock


Status:  Acute


(2) DIC (disseminated intravascular coagulation)


Status:  Acute


(3) Severe sepsis


Status:  Acute


(4) Abdominal infection


Status:  Acute


(5) S/P cholecystectomy


Status:  Chronic


(6) End-stage liver disease


Status:  Acute


(7) Poor prognosis


Status:  Acute


(8) Hypotension


Status:  Acute











MAGDALENE VALADEZ MD               May 1, 2022 18:55

## 2022-05-01 NOTE — PROGRESS NOTE
Subjective


Date Seen by a Provider:  May 1, 2022


Time Seen by a Provider:  09:00


Subjective/Events-last exam


doing slightly better today. more awake/alert. copious ascites drainage.





Focused Exam


Lactate Level


4/30/22 04:45: Lactic Acid Level 1.44





Objective


Exam





Vital Signs








  Date Time  Temp Pulse Resp B/P (MAP) Pulse Ox O2 Delivery O2 Flow Rate FiO2


 


5/1/22 09:00  125 27 86/56 100 Nasal Cannula 3.00 


 


5/1/22 08:00     100 Nasal Cannula 3.00 


 


5/1/22 08:00  115 19 91/56 100 Nasal Cannula 3.00 


 


5/1/22 08:00 36.1       


 


5/1/22 07:00  117      


 


5/1/22 07:00  124 19 106/60 100 Nasal Cannula 3.00 


 


5/1/22 06:11      Nasal Cannula 3.00 


 


5/1/22 06:00  116 16 89/56 100 Nasal Cannula 3.00 


 


5/1/22 05:00  126 13 99/60 100 Nasal Cannula 3.00 


 


5/1/22 04:51  118  106/59    


 


5/1/22 04:00     100 Nasal Cannula 3.00 


 


5/1/22 04:00  123 15 103/58 100 Nasal Cannula 3.00 


 


5/1/22 03:00  118 14 106/59 100 Nasal Cannula 3.00 


 


5/1/22 02:00  116 20 99/56 100 Nasal Cannula 3.00 


 


5/1/22 01:00  120      


 


5/1/22 01:00  118 18 112/54 100 Nasal Cannula 3.00 


 


5/1/22 00:00  123 16 99/62 100 Nasal Cannula 3.00 


 


4/30/22 23:59     100 Nasal Cannula 3.00 


 


4/30/22 23:00  112 17 108/60 100 Nasal Cannula 3.00 


 


4/30/22 22:00  112 14 103/61 100 Nasal Cannula 3.00 


 


4/30/22 21:00  117 16 97/60 100 Nasal Cannula 3.00 


 


4/30/22 20:25 36.3       


 


4/30/22 20:00  114 15 101/67 100 Nasal Cannula 3.00 


 


4/30/22 20:00     100 Nasal Cannula 3.00 


 


4/30/22 19:00  122      


 


4/30/22 19:00  117 14 93/52 100 Nasal Cannula 3.00 


 


4/30/22 18:00  141 19 105/59 99 Nasal Cannula 3.00 


 


4/30/22 17:00  128 16 84/58 98 Nasal Cannula 3.00 


 


4/30/22 16:08  138  88/50    


 


4/30/22 16:00     100 Nasal Cannula 3.00 


 


4/30/22 16:00  152 22 88/50 100 Nasal Cannula 3.00 


 


4/30/22 15:00  122 24 98/61 100 Nasal Cannula 3.00 


 


4/30/22 14:00  135 23 103/67 100 Nasal Cannula 3.00 


 


4/30/22 13:00  134 13 104/59 91 Nasal Cannula 3.00 


 


4/30/22 12:44  123      


 


4/30/22 12:00     98 Nasal Cannula 3.00 


 


4/30/22 12:00  126 20 116/73 96 Nasal Cannula 3.00 


 


4/30/22 11:45 36.1       


 


4/30/22 11:00  122 16 109/65 97 Nasal Cannula 3.00 














I & O 


 


 5/1/22





 06:59


 


Intake Total 525 ml


 


Output Total 8830 ml


 


Balance -8305 ml





Capillary Refill : Less Than 3 Seconds


General Appearance:  No Apparent Distress


HEENT:  PERRL/EOMI


Neck:  Full Range of Motion


Respiratory:  Chest Non Tender, Normal Breath Sounds


Cardiovascular:  Regular Rate, Rhythm


Gastrointestinal:  normal bowel sounds, soft, distended


Extremity:  Normal Capillary Refill


Neurologic/Psychiatric:  Disoriented


Skin:  Normal Color


Lymphatic:  No Adenopathy





Results


Lab


Laboratory Tests


4/30/22 10:32: Glucometer 145H


4/30/22 11:41: Glucometer 155H


4/30/22 17:55: Glucometer 162H


5/1/22 05:05: 


White Blood Count 9.6, Red Blood Count 2.17L, Hemoglobin 9.0L, Hematocrit 26L, 

Mean Corpuscular Volume 118H, Mean Corpuscular Hemoglobin 42H, Mean Corpuscular 

Hemoglobin Concent 35, Red Cell Distribution Width 14.6H, Platelet Count 28*L, 

Mean Platelet Volume 12.2, Immature Granulocyte % (Auto) 1, Neutrophils (%) 

(Auto) 69, Lymphocytes (%) (Auto) 16, Monocytes (%) (Auto) 13H, Eosinophils (%) 

(Auto) 1, Basophils (%) (Auto) 0, Neutrophils # (Auto) 6.7, Lymphocytes # (Auto)

1.5, Monocytes # (Auto) 1.3H, Eosinophils # (Auto) 0.1, Basophils # (Auto) 0.0, 

Immature Granulocyte # (Auto) 0.1, Percent Immature Platelet Fraction 7.1, 

Sodium Level 140, Potassium Level 4.0, Chloride Level 109H, Carbon Dioxide Level

20L, Anion Gap 11, Blood Urea Nitrogen 62H, Creatinine 2.70H, Estimat Glomerular

Filtration Rate 23, BUN/Creatinine Ratio 23, Glucose Level 151H, Calcium Level 

7.2L, Corrected Calcium 8.8, Phosphorus Level 3.2, Magnesium Level 1.9, Total 

Bilirubin 2.1H, Aspartate Amino Transf (AST/SGOT) 71H, Alanine Aminotransferase 

(ALT/SGPT) 53, Alkaline Phosphatase 105, Total Protein 4.3L, Albumin 2.0L





Microbiology


4/23/22 MRSA Screen - Final, Complete


          MRSA not isolated


4/23/22 Gram Stain - Final, Complete


          


4/23/22 Wound Culture - Final, Complete


          Mixed Bacterial Marcella


          Enterococcus species


4/23/22 Blood Culture - Final, Complete


          No growth





Assessment/Plan


Assessment/Plan


Assess & Plan/Chief Complaint


liver cirrhosis s/p lap cholecystectomy 4/7/22.


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


will proceed with placement interrupted figure of 8 sutures across umbilical 

wound. 


low protein diet.


Na and fluid restriction.


cont spironolactone. 


continues to be hypotensive and tachycardic. will get CT chest/abd/pelvis.


will proceed with paracentesis.











BRENDA WELCH MD                 May 1, 2022 10:04

## 2022-05-01 NOTE — TELE-ICU PROGRESS NOTE
Subjective


Date Seen by a Provider:  May 1, 2022


Time Seen by a Provider:  07:35


Subjective/Events-last exam


This virtual visit was conducted using real time audio/video.


Thank you for asking us to see this patient for sepsis.


Paracentesis catheter placed 4/30/22.


PE: VSS.  O2 sat 100% on 3 LPM NC.





HEENT: No obvious masses, adenopathy or JVD.


              Chest: clear to auscultation.


              CV: RRR S1 S2 No murmur or added sounds.


              Abd: Non-tender. Bowel sounds Y.


              : Unremarkable. Purdy Y.


              CNS/psychiatric: Grossly intact. No obvious focal findings.


              Extremities: Trace edema. Capillary refill < 3 seconds.


              Skin: unremarkable.





Results: Elevated  BUN 62, Creat 2.7.   Decreased Hb 9.0, plts 28K., Alb 2.0.   

CXR: clear.


Available chart/ vitals / labs / images reviewed.


Video assessment done using teleICU camera, rest of exam as per RN.





A/P: Critical Care: critically ill patient. Cont.Levo., SCDs, thiamine, folate, 

bicarb drip, midodrine. Wean Levo as sanna.


Hem consult w low plts. 3rd request





Discussed with TROY Chaves. Asked RN to reach out to eICU if any questions or 

concerns later. 


Time spent with patient/coordination of care with other health professionals 

(mins): 20





Sepsis Event


Evaluation


Height, Weight, BMI


Height: 5'9.00"


Weight: 179lbs. 0.0oz. 81.765531yv; 32.72 BMI


Method:





Focused Exam


Lactate Level


4/30/22 04:45: Lactic Acid Level 1.44





Exam


Exam


Patient acknowledged, consented, and participated in this virtual visit which 

was conducted using real time audio/video


Vital Signs








  Date Time  Temp Pulse Resp B/P (MAP) Pulse Ox O2 Delivery O2 Flow Rate FiO2


 


5/1/22 09:00  125 27 86/56 100 Nasal Cannula 3.00 


 


5/1/22 08:00     100 Nasal Cannula 3.00 


 


5/1/22 08:00  115 19 91/56 100 Nasal Cannula 3.00 


 


5/1/22 08:00 36.1       


 


5/1/22 07:00  117      


 


5/1/22 07:00  124 19 106/60 100 Nasal Cannula 3.00 


 


5/1/22 06:11      Nasal Cannula 3.00 


 


5/1/22 06:00  116 16 89/56 100 Nasal Cannula 3.00 


 


5/1/22 05:00  126 13 99/60 100 Nasal Cannula 3.00 


 


5/1/22 04:51  118  106/59    


 


5/1/22 04:00     100 Nasal Cannula 3.00 


 


5/1/22 04:00  123 15 103/58 100 Nasal Cannula 3.00 


 


5/1/22 03:00  118 14 106/59 100 Nasal Cannula 3.00 


 


5/1/22 02:00  116 20 99/56 100 Nasal Cannula 3.00 


 


5/1/22 01:00  120      


 


5/1/22 01:00  118 18 112/54 100 Nasal Cannula 3.00 


 


5/1/22 00:00  123 16 99/62 100 Nasal Cannula 3.00 


 


4/30/22 23:59     100 Nasal Cannula 3.00 


 


4/30/22 23:00  112 17 108/60 100 Nasal Cannula 3.00 


 


4/30/22 22:00  112 14 103/61 100 Nasal Cannula 3.00 


 


4/30/22 21:00  117 16 97/60 100 Nasal Cannula 3.00 


 


4/30/22 20:25 36.3       


 


4/30/22 20:00  114 15 101/67 100 Nasal Cannula 3.00 


 


4/30/22 20:00     100 Nasal Cannula 3.00 


 


4/30/22 19:00  122      


 


4/30/22 19:00  117 14 93/52 100 Nasal Cannula 3.00 


 


4/30/22 18:00  141 19 105/59 99 Nasal Cannula 3.00 


 


4/30/22 17:00  128 16 84/58 98 Nasal Cannula 3.00 


 


4/30/22 16:08  138  88/50    


 


4/30/22 16:00     100 Nasal Cannula 3.00 


 


4/30/22 16:00  152 22 88/50 100 Nasal Cannula 3.00 


 


4/30/22 15:00  122 24 98/61 100 Nasal Cannula 3.00 


 


4/30/22 14:00  135 23 103/67 100 Nasal Cannula 3.00 


 


4/30/22 13:00  134 13 104/59 91 Nasal Cannula 3.00 


 


4/30/22 12:44  123      


 


4/30/22 12:00     98 Nasal Cannula 3.00 


 


4/30/22 12:00  126 20 116/73 96 Nasal Cannula 3.00 


 


4/30/22 11:45 36.1       


 


4/30/22 11:00  122 16 109/65 97 Nasal Cannula 3.00 


 


4/30/22 10:00  130 16 90/65 96 Nasal Cannula 3.00 














I & O 


 


 5/1/22





 07:00


 


Intake Total 525 ml


 


Output Total 8830 ml


 


Balance -8305 ml








Height & Weight


Height: 5'9.00"


Weight: 179lbs. 0.0oz. 81.996732pg; 32.72 BMI


Method:


General Appearance:  No Apparent Distress, Chronically ill, Obese


HEENT:  PERRL/EOMI


Neck:  Full Range of Motion


Respiratory:  Lungs Clear, No Respiratory Distress


Cardiovascular:  No Murmur, Tachycardia


Capillary Refill:  Less Than 3 Seconds


Peripheral Pulses:  2+ Radial Pulses (R), 2+ Radial Pulses (L)


Gastrointestinal:  soft, distended


Extremity:  Normal Inspection, Pedal Edema


Neurologic/Psychiatric:  Alert, Normal Mood/Affect


Skin:  Warm/Dry, Jaundice


Lymphatic:  No Adenopathy





Results


Lab


Laboratory Tests


4/30/22 04:45








5/1/22 05:05











Assessment/Plan


Assessment/Plan


See free text.


Critical Care:  Critically Ill Patient











DIANE ABEL MD           May 1, 2022 09:26

## 2022-05-02 LAB
ALBUMIN SERPL-MCNC: 2.1 GM/DL (ref 3.2–4.5)
ALP SERPL-CCNC: 103 U/L (ref 40–136)
ALT SERPL-CCNC: 50 U/L (ref 0–55)
BASOPHILS # BLD AUTO: 0 10^3/UL (ref 0–0.1)
BASOPHILS NFR BLD AUTO: 0 % (ref 0–10)
BILIRUB SERPL-MCNC: 2.2 MG/DL (ref 0.1–1)
BUN/CREAT SERPL: 27
CALCIUM SERPL-MCNC: 7.1 MG/DL (ref 8.5–10.1)
CHLORIDE SERPL-SCNC: 104 MMOL/L (ref 98–107)
CO2 SERPL-SCNC: 23 MMOL/L (ref 21–32)
CREAT SERPL-MCNC: 2.48 MG/DL (ref 0.6–1.3)
EOSINOPHIL # BLD AUTO: 0.1 10^3/UL (ref 0–0.3)
EOSINOPHIL NFR BLD AUTO: 1 % (ref 0–10)
GFR SERPLBLD BASED ON 1.73 SQ M-ARVRAT: 26 ML/MIN
GLUCOSE SERPL-MCNC: 133 MG/DL (ref 70–105)
HCT VFR BLD CALC: 25 % (ref 40–54)
HGB BLD-MCNC: 8.8 G/DL (ref 13.3–17.7)
LYMPHOCYTES # BLD AUTO: 1.3 10^3/UL (ref 1–4)
LYMPHOCYTES NFR BLD AUTO: 18 % (ref 12–44)
MAGNESIUM SERPL-MCNC: 1.9 MG/DL (ref 1.6–2.4)
MANUAL DIFFERENTIAL PERFORMED BLD QL: NO
MCH RBC QN AUTO: 41 PG (ref 25–34)
MCHC RBC AUTO-ENTMCNC: 35 G/DL (ref 32–36)
MCV RBC AUTO: 116 FL (ref 80–99)
MONOCYTES # BLD AUTO: 1 10^3/UL (ref 0–1)
MONOCYTES NFR BLD AUTO: 14 % (ref 0–12)
NEUTROPHILS # BLD AUTO: 4.9 10^3/UL (ref 1.8–7.8)
NEUTROPHILS NFR BLD AUTO: 67 % (ref 42–75)
PHOSPHATE SERPL-MCNC: 3.5 MG/DL (ref 2.3–4.7)
PLATELET # BLD: 27 10^3/UL (ref 130–400)
PMV BLD AUTO: 12.2 FL (ref 9–12.2)
POTASSIUM SERPL-SCNC: 3.7 MMOL/L (ref 3.6–5)
PROT SERPL-MCNC: 4.2 GM/DL (ref 6.4–8.2)
SODIUM SERPL-SCNC: 139 MMOL/L (ref 135–145)
WBC # BLD AUTO: 7.4 10^3/UL (ref 4.3–11)

## 2022-05-02 RX ADMIN — Medication SCH MG: at 08:10

## 2022-05-02 RX ADMIN — SODIUM CHLORIDE SCH MLS/HR: 4.5 INJECTION, SOLUTION INTRAVENOUS at 22:39

## 2022-05-02 RX ADMIN — PANTOPRAZOLE SODIUM SCH MG: 40 INJECTION, POWDER, FOR SOLUTION INTRAVENOUS at 19:55

## 2022-05-02 RX ADMIN — MIDODRINE HYDROCHLORIDE SCH MG: 10 TABLET ORAL at 13:59

## 2022-05-02 RX ADMIN — MIDODRINE HYDROCHLORIDE SCH MG: 10 TABLET ORAL at 08:10

## 2022-05-02 RX ADMIN — VASOPRESSIN SCH MLS/HR: 20 INJECTION INTRAVENOUS at 00:24

## 2022-05-02 RX ADMIN — SODIUM CHLORIDE SCH MLS/HR: 4.5 INJECTION, SOLUTION INTRAVENOUS at 14:25

## 2022-05-02 RX ADMIN — MIDODRINE HYDROCHLORIDE SCH MG: 10 TABLET ORAL at 18:04

## 2022-05-02 RX ADMIN — VASOPRESSIN SCH MLS/HR: 20 INJECTION INTRAVENOUS at 22:39

## 2022-05-02 RX ADMIN — FOLIC ACID SCH MG: 1 TABLET ORAL at 08:10

## 2022-05-02 RX ADMIN — NOREPINEPHRINE BITARTRATE SCH MLS/HR: 1 INJECTION, SOLUTION, CONCENTRATE INTRAVENOUS at 00:23

## 2022-05-02 RX ADMIN — VASOPRESSIN SCH MLS/HR: 20 INJECTION INTRAVENOUS at 11:47

## 2022-05-02 RX ADMIN — PANTOPRAZOLE SODIUM SCH MG: 40 INJECTION, POWDER, FOR SOLUTION INTRAVENOUS at 08:10

## 2022-05-02 RX ADMIN — SODIUM CHLORIDE SCH MLS/HR: 4.5 INJECTION, SOLUTION INTRAVENOUS at 00:24

## 2022-05-02 NOTE — ONCOLOGY CONSULTATION
Visit Information


Visit Information


Date of Admission


Apr 23, 2022 at 17:30


Attending Physician


Kaykay Farmer MD


Admitting Physician


No,Local Physician


Chief Complaint


Thrombocytopenia and anemia in the setting of liver cirrhosis, liver failure.


Interval History


This is a 81 year old white man with long h/o alcohol usage and liver cirrhosis,

s/p cholecystectomy 4/2022 and admitted to ICU due to hypotensive currently on 

two pressors, confusion. He was noticed to have plt in the 20s range. Hb slowly 

trending from 10 to 8 over last 3 days. He is also somewhat confused. No pain.


I consulted the patient on:


5/2/22


 15:33


Time Seen by Provider:  15:38





Review of Systems


Constitutional:  see HPI





Health Status


Allergies


Coded Allergies:  


     No Known Drug Allergies (Unverified , 4/7/22)





Home Medications


Glutamine (l-Glutamine) 500 Mg Capsule, 500 MG PO DAILY, (Reported)


Metoprolol Succinate (Metoprolol Succinate) 25 Mg Tab.er.24h, 25 MG PO DAILY, 

(Reported)


Milk Thistle (Milk Thistle) 175 Mg Tablet, 375 MG PO DAILY, (Reported)


Spironolactone (Spironolactone) 50 Mg Tablet, 50 MG PO DAILY, (Reported)


Sulfamethoxazole/Trimethoprim (Bactrim Ds Tablet) 1 Each Tablet, 1 EA PO BID, 

(Reported)


   FILLED 04- #20/10 DAY SUPPLY 


Vitamin B Complex (Vitamin B Complex) 1 Each Capsule, 1 EACH PO DAILY, 

(Reported)





PMH-Social-Family Hx


Patient Social History


Marrital Status:  


Smoking Status:  Former Smoker (Smoked 0.75 PPD for 18 years)


Former smoker/When Quit:  Mar 1, 1990


Type Used:  Cigars


2nd Hand Smoke Exposure:  No


Recent Hopitalizations:  No


Alcohol Use?:  Yes


Have you traveled recently?:  No





Immunizations Up To Date


Date of Influenza Vaccine:  Oct 13, 2021





Family Medical History


Significant Family History:  No Pertinent Family Hx





Physical Exam


Vital Signs





Vital Signs - First Documented








 4/26/22 4/26/22 4/29/22





 00:00 01:00 06:10


 


Temp  36.2 


 


Pulse 120  


 


Resp 8  


 


B/P (MAP) 93/68  


 


Pulse Ox 96  


 


O2 Delivery Room Air  


 


O2 Flow Rate   2.00





Capillary Refill : Less Than 3 Seconds


Height, Weight, BMI


Height: 5'9.00"


Weight: 179lbs. 0.0oz. 81.711718wl; 32.72 BMI


Method:


General Appearance:  No Apparent Distress, Other (comfortably sleeping)


Respiratory:  No Accessory Muscle Use, No Respiratory Distress





Data Review


Labs


Laboratory Tests


5/2/22 03:55








Laboratory Tests


4/29/22 17:38: Glucometer 150H


4/30/22 04:45: 


White Blood Count 16.0H, Red Blood Count 2.40L, Hemoglobin 10.1L, Hematocrit 28L

, Mean Corpuscular Volume 118H, Mean Corpuscular Hemoglobin 42H, Platelet Count 

30*L, Lymphocytes (%) (Auto) 11L, Monocytes (%) (Auto) 14H, Neutrophils # (Auto)

11.9H, Monocytes # (Auto) 2.2H, Chloride Level 111H, Carbon Dioxide Level 16L, 

Blood Urea Nitrogen 45H, Creatinine 2.34H, Glucose Level 161H, Calcium Level 

7.2L, Total Bilirubin 2.5H, Aspartate Amino Transf (AST/SGOT) 79H, Alanine 

Aminotransferase (ALT/SGPT) 64H, Total Protein 4.6L, Albumin 2.2L


4/30/22 10:32: Glucometer 145H


4/30/22 11:41: Glucometer 155H


4/30/22 17:55: Glucometer 162H


5/1/22 05:05: 


Red Blood Count 2.17L, Hemoglobin 9.0L, Hematocrit 26L, Mean Corpuscular Volume 

118H, Mean Corpuscular Hemoglobin 42H, Red Cell Distribution Width 14.6H, 

Platelet Count 28*L, Monocytes (%) (Auto) 13H, Monocytes # (Auto) 1.3H, Chloride

Level 109H, Carbon Dioxide Level 20L, Blood Urea Nitrogen 62H, Creatinine 2.70H,

Glucose Level 151H, Calcium Level 7.2L, Total Bilirubin 2.1H, Aspartate Amino 

Transf (AST/SGOT) 71H, Total Protein 4.3L, Albumin 2.0L


5/1/22 11:30: Glucometer 176H


5/1/22 17:16: Glucometer 156H


5/2/22 00:31: Glucometer 138H


5/2/22 03:55: 


Red Blood Count 2.14L, Hemoglobin 8.8L, Hematocrit 25L, Mean Corpuscular Volume 

116H, Mean Corpuscular Hemoglobin 41H, Red Cell Distribution Width 14.6H, 

Platelet Count 27*L, Monocytes (%) (Auto) 14H, Blood Urea Nitrogen 66H, 

Creatinine 2.48H, Glucose Level 133H, Calcium Level 7.1L, Total Bilirubin 2.2H, 

Aspartate Amino Transf (AST/SGOT) 71H, Total Protein 4.2L, Albumin 2.1L


5/2/22 12:22: Glucometer 178H








Impression & Plan


Impression & Plan


IMP: 


1. Long term alcohol usage, liver cirrhosis, liver failure abnormal 

coagulopathy.


2. Hepatorenal syndrome. Cr 2.8


3. Thrombocytopenia due to liver failure


4. Anemia due to liver hepatorenal syndrome and possible GI bleeding. 


5. Prognosis is guarded. 


REC:


1. Unfortunately, pt is in his end-stage of liver failure. I would not have 

anything to offer except transfusion support if needed.


2. I would suggest to discuss hospice and/or comfort care. 


Thank you for the consultation.











MERLE DUTTON MD               May 2, 2022 15:38

## 2022-05-02 NOTE — TELE-ICU PROGRESS NOTE
Subjective


Date Seen by a Provider:  May 2, 2022


Time Seen by a Provider:  07:35


Subjective/Events-last exam


This virtual visit was conducted using real time audio/video.


Thank you for asking us to see this patient for sepsis.


Paracentesis catheter placed 4/30/22. Copious output. S/B Hem. No further 

actions at this time.


PE: VSS.  O2 sat 96% on RA





HEENT: No obvious masses, adenopathy or JVD.


              Chest: clear to auscultation.


              CV: RRR S1 S2 No murmur or added sounds.


              Abd: Non-tender. Bowel sounds Y.


              : Unremarkable. Purdy Y.


              CNS/psychiatric: Grossly intact. No obvious focal findings.


              Extremities: 1-2+ edema. Capillary refill < 3 seconds.


              Skin: unremarkable.





Results: Elevated  BUN 66, Creat 2.1.   Decreased Hb 8.8, plts 27K., Alb 2.1.   

CXR: clear.


Available chart/ vitals / labs / images reviewed.


Video assessment done using teleICU camera, rest of exam as per RN.





A/P: Critical Care: critically ill patient. Cont.Levo., SCDs, thiamine, folate, 

bicarb drip, midodrine. Wean Levo as sanna.


Hem consult w low plts: no actions at this time.





Discussed with RN MAURICIO. Asked RN to reach out to eICU if any questions or concerns

later. 


Time spent with patient/coordination of care with other health professionals 

(mins): 15





Sepsis Event


Evaluation


Height, Weight, BMI


Height: 5'9.00"


Weight: 179lbs. 0.0oz. 81.400516vu; 32.72 BMI


Method:





Focused Exam


Lactate Level


4/30/22 04:45: Lactic Acid Level 1.44





Exam


Exam


Patient acknowledged, consented, and participated in this virtual visit which 

was conducted using real time audio/video


Vital Signs








  Date Time  Temp Pulse Resp B/P (MAP) Pulse Ox O2 Delivery O2 Flow Rate FiO2


 


5/2/22 08:00     97 Room Air  


 


5/2/22 08:00 36.2       


 


5/2/22 08:00  133 19 92/72 98 Room Air  


 


5/2/22 07:00  108      


 


5/2/22 07:00  105 17 96/58 100 Room Air  


 


5/2/22 06:00  102 17 100/63 91 Room Air  


 


5/2/22 05:00  104 18 104/69 100 Room Air  


 


5/2/22 04:00     96 Room Air  


 


5/2/22 04:00  104 19 97/67 100 Room Air  


 


5/2/22 03:00  101 16 103/63 100 Nasal Cannula 2.00 


 


5/2/22 02:00  106 15 104/71 100 Nasal Cannula 2.00 


 


5/2/22 01:00  125 16 115/66 100 Nasal Cannula 2.00 


 


5/2/22 01:00  106      


 


5/2/22 00:24    78/60    


 


5/2/22 00:00  104 19 97/67 100 Nasal Cannula 2.00 


 


5/2/22 00:00     98 Nasal Cannula 2.00 


 


5/1/22 23:00  101 17 99/56 100 Nasal Cannula 2.00 


 


5/1/22 22:00  113 13 99/63 100 Nasal Cannula 2.00 


 


5/1/22 21:00  104 17 106/71 100 Nasal Cannula 2.00 


 


5/1/22 20:00  112 14 106/66 100 Nasal Cannula 2.00 


 


5/1/22 20:00     100 Nasal Cannula 2.00 


 


5/1/22 19:15  113 21 95/60 100   


 


5/1/22 19:00  113      


 


5/1/22 19:00 36.7     Nasal Cannula 2.00 


 


5/1/22 19:00  114 18 100/66 100   


 


5/1/22 18:00  116 14 77/52 100 Nasal Cannula 3.00 


 


5/1/22 17:00  108 14 99/62 100 Nasal Cannula 3.00 


 


5/1/22 16:00  110 13 90/63 100 Nasal Cannula 3.00 


 


5/1/22 15:57     100 Nasal Cannula 3.00 


 


5/1/22 15:00  111 15 90/65 100 Nasal Cannula 3.00 


 


5/1/22 14:00    99/65    


 


5/1/22 14:00  114 12 99/65 100 Nasal Cannula 3.00 


 


5/1/22 13:00  115 13 98/61 100 Nasal Cannula 3.00 


 


5/1/22 13:00  119      


 


5/1/22 12:00  118 18 91/63 100 Nasal Cannula 3.00 


 


5/1/22 11:59 36.0       


 


5/1/22 11:30     100 Nasal Cannula 3.00 


 


5/1/22 11:00  115 14 98/61 100 Nasal Cannula 3.00 


 


5/1/22 10:00  121 16 90/61 100 Nasal Cannula 3.00 


 


5/1/22 09:00  125 27 86/56 100 Nasal Cannula 3.00 














I & O 


 


 5/2/22





 07:00


 


Intake Total 550 ml


 


Output Total 4940 ml


 


Balance -4390 ml








Height & Weight


Height: 5'9.00"


Weight: 179lbs. 0.0oz. 81.394367zi; 32.72 BMI


Method:


General Appearance:  No Apparent Distress, Chronically ill, Obese


HEENT:  PERRL/EOMI


Neck:  Full Range of Motion


Respiratory:  Lungs Clear, No Respiratory Distress


Cardiovascular:  No Murmur, Tachycardia


Capillary Refill:  Less Than 3 Seconds


Peripheral Pulses:  2+ Radial Pulses (R), 2+ Radial Pulses (L)


Gastrointestinal:  normal bowel sounds, soft, distended


Extremity:  Normal Inspection, Pedal Edema


Neurologic/Psychiatric:  Alert, No Motor/Sensory Deficits


Skin:  Normal Color, Warm/Dry


Lymphatic:  No Adenopathy





Results


Lab


Laboratory Tests


5/1/22 05:05








5/2/22 03:55











Assessment/Plan


Assessment/Plan


See free text.


Critical Care:  Critically Ill Patient











DIANE ABEL MD           May 2, 2022 08:55

## 2022-05-02 NOTE — PROGRESS NOTE - HOSPITALIST
Subjective


HPI/CC On Admission


Date Seen by Provider:  May 2, 2022


Time Seen by Provider:  09:05


Patient is 81-year-old  male past medical history of cirrhosis, 

hypertension, and recent cholecystectomy who presented to the emergency room due

to drainage from surgical wounds and low blood pressure.  He is a very poor 

historian but per the ER note he also complained of nausea and decreased 

appetite.  He told me his surgery was 2 years ago though records reveal it was 

much more recent than that (4/7).  He does not complain of any of this to me and

states he is feeling very well.  When asked why he came to the hospital then if 

he was feeling well he states because his sister is a nurse.  Reviewing records 

reveals he was seen in the emergency department last week for drainage from 

surgical incisions and a suture was placed.  He saw Dr. WELCH's nurse 

practitioner on April 20 for removal.  Since then he has continued to have 

drainage.  He also continues to drink 1-2 glasses of wine a day.


Subjective/Events-last exam


Pt reports feeling well. No complaints. About to go to the bathroom. No family 

at bedside.





Focused Exam


Lactate Level


4/30/22 04:45: Lactic Acid Level 1.44








Objective


Exam


Vital Signs





Vital Signs








  Date Time  Temp Pulse Resp B/P (MAP) Pulse Ox O2 Delivery O2 Flow Rate FiO2


 


5/2/22 08:00     97 Room Air  


 


5/2/22 08:00 36.2       


 


5/2/22 08:00  133 19 92/72    


 


5/2/22 03:00       2.00 





Capillary Refill : Less Than 3 Seconds


General Appearance:  No Apparent Distress


Respiratory:  Lungs Clear, No Respiratory Distress


Cardiovascular:  No Murmur, Tachycardia


Neurologic/Psychiatric:  Alert, Oriented x3 (to major details, confused by some 

of the medical devices attached to help)





Results/Procedures


Lab


Laboratory Tests


5/2/22 03:55








Patient resulted labs reviewed.


Imaging:  Reviewed Imaging Report





Assessment/Plan


Assessment and Plan


Assess & Plan/Chief Complaint


Septic shock


Oliguric RITA


Hepatorenal syndrome


Recent cholecystectomy


Possible SBP


Thrombocytopenia


DIC


Melena


End stage liver disease


Decompensated cirrhosis


Poor prognosis


Goals of care discussion


   Cultures with Enterococcus 


   CT chest/abdomen with moderate ascites- Surgery placed drain


   Continue Unasyn per c/s


   Continue fluids with Bicarb


   Continue pressors- still on 2


   Continue midodrine


   Peritoneal catheter in place, draining


   IV PPI BID


   TeleICU following


   Surgery following


   Cardiology following


   Palliative care following





DVT ppx: SCDs duet to thrombocytopenia





Critical Care


Critically Ill Patient











HALEY STEVE MD               May 2, 2022 09:11

## 2022-05-02 NOTE — PHYSICAL THERAPY DAILY NOTE
PT Daily Note-Current


Subjective


Patient on commode.  PCT present





Transfers


SCALE: Activities may be completed with or without assistive devices.





6-Indepedent-patient completes the activity by him/herself with no assistance 

from a helper.


5-Set-up or Clean-up Assistance-helper sets up or cleans up; patient completes 

activity. Darwin assists only prior to or  


    following the activity.


4-Supervision or Touching Assistance-helper provides verbal cues and/or 

touching/steadying and/or contact guard assistance as patient completes 

activity. Assistance may be provided   


    throughout the activity or intermittently.


3-Partial/Moderate Assistance-helper does LESS THAN HALF the effort. Darwin lift

s, holds or supports trunk or limbs, but provides less than half the effort.


2-Substantial/Maximal Assistance-helper does MORE THAN HALF the effort. Darwin 

lifts or holds trunk or limbs and provides more than half the effort.


0-Xljotlqhj-qqhwsp does ALL the effort. Patient does none of the effort to 

complete the activity. Or, the assistance of 2 or more helpers is required for 

the patient to complete the  


    activity.


If activity was not attempted, code reason:


7-Patient Refused.


9-Not Applicable-not attempted and the patient did not perform the activity 

before the current illness, exacerbation or injury.


10-Not Attempted due to Environmental Limitations-(lack of equipment, weather 

restraints, etc.).


88-Not Attempted due to Medical Conditions or Safety Concerns.


Lying to Sitting/Side of Bed(Q:  2


Sit to Stand (QC):  2


Chair/Bed-to-Chair Xfer(QC):  2 (SPT commode to bed)





Assessment


Patient impulsive to sit with transfer sitting on EOB.  Max assist with all 

mobility on this date.





PT Long Term Goals


Long Term Goals


PT Long Term Goals Time Frame:  May 7, 2022


Roll Left & Right (QC):  6


Sit to Lying (QC):  6


Lying-Sitting on Side/Bed(QC):  6


Sit to Stand (QC):  6


Chair/Bed-to-Chair Xfer(QC):  6


Toilet Transfer (QC):  6


Walk 10 feet (QC):  6


Walk 50ft with 2 Turns (QC):  6


Walk 150 ft (QC):  6





PT Plan


Treatment/Plan


Treatment Plan:  Continue Plan of Care


Treatment Plan:  Bed Mobility, Education, Functional Activity Elia, Functional 

Strength, Gait, Safety, Therapeutic Exercise, Transfers


Treatment Duration:  May 7, 2022


Frequency:  6 times per week


Estimated Hrs Per Day:  .25 hour per day


Patient and/or Family Agrees t:  Yes





Time/GCodes


Time In:  755


Time Out:  804


Total Billed Treatment Time:  9


Total Billed Treatment


1 visit


FA 9 min











CY SAALS PT               May 2, 2022 09:47

## 2022-05-02 NOTE — PROGRESS NOTE
Subjective


Date Seen by a Provider:  May 2, 2022


Time Seen by a Provider:  19:00


Subjective/Events-last exam


very somnolent today. still having copious peritoneal drain output. labs look 

like DIC picture.





Focused Exam


Lactate Level


4/30/22 04:45: Lactic Acid Level 1.44





Objective


Exam





Vital Signs








  Date Time  Temp Pulse Resp B/P (MAP) Pulse Ox O2 Delivery O2 Flow Rate FiO2


 


5/2/22 18:00  116 16 96/59 98 Room Air  


 


5/2/22 17:00  116 24 92/60 99 Room Air  


 


5/2/22 16:00     97 Room Air  


 


5/2/22 16:00  107 15 94/55 100 Room Air  


 


5/2/22 15:56 36.3       


 


5/2/22 15:00  115 18 125/89 100 Room Air  


 


5/2/22 14:00  114 23 93/54 100 Room Air  


 


5/2/22 13:00  113      


 


5/2/22 13:00  107 15 107/62 97 Room Air  


 


5/2/22 12:00     97 Room Air  


 


5/2/22 12:00  111 14 98/55 97 Room Air  


 


5/2/22 12:00 36.3       


 


5/2/22 11:47  114  85/55    


 


5/2/22 11:00  110 14 109/63 99 Room Air  


 


5/2/22 10:00  103 17 92/62 100 Room Air  


 


5/2/22 09:00  106 17 98/59 98 Room Air  


 


5/2/22 08:00     97 Room Air  


 


5/2/22 08:00 36.2       


 


5/2/22 08:00  133 19 109/69 98 Room Air  


 


5/2/22 07:00  108      


 


5/2/22 07:00  105 17 96/58 100 Room Air  


 


5/2/22 06:00  102 17 100/63 91 Room Air  


 


5/2/22 05:00  104 18 104/69 100 Room Air  


 


5/2/22 04:00     96 Room Air  


 


5/2/22 04:00  104 19 97/67 100 Room Air  


 


5/2/22 03:00  101 16 103/63 100 Nasal Cannula 2.00 


 


5/2/22 02:00  106 15 104/71 100 Nasal Cannula 2.00 


 


5/2/22 01:00  125 16 115/66 100 Nasal Cannula 2.00 


 


5/2/22 01:00  106      


 


5/2/22 00:24    78/60    


 


5/2/22 00:00  104 19 97/67 100 Nasal Cannula 2.00 


 


5/2/22 00:00     98 Nasal Cannula 2.00 


 


5/1/22 23:00  101 17 99/56 100 Nasal Cannula 2.00 


 


5/1/22 22:00  113 13 99/63 100 Nasal Cannula 2.00 


 


5/1/22 21:00  104 17 106/71 100 Nasal Cannula 2.00 


 


5/1/22 20:00  112 14 106/66 100 Nasal Cannula 2.00 


 


5/1/22 20:00     100 Nasal Cannula 2.00 


 


5/1/22 19:15  113 21 95/60 100   














I & O 


 


 5/2/22





 07:00


 


Intake Total 550 ml


 


Output Total 4940 ml


 


Balance -4390 ml





Capillary Refill : Less Than 3 Seconds


General Appearance:  Chronically ill


Neck:  Non Tender


Respiratory:  Decreased Breath Sounds


Cardiovascular:  Tachycardia


Gastrointestinal:  soft, distended


Extremity:  Normal Capillary Refill


Neurologic/Psychiatric:  Disoriented


Skin:  Normal Color


Lymphatic:  No Adenopathy





Results


Lab


Laboratory Tests


5/2/22 00:31: Glucometer 138H


5/2/22 03:55: 


White Blood Count 7.4, Red Blood Count 2.14L, Hemoglobin 8.8L, Hematocrit 25L, 

Mean Corpuscular Volume 116H, Mean Corpuscular Hemoglobin 41H, Mean Corpuscular 

Hemoglobin Concent 35, Red Cell Distribution Width 14.6H, Platelet Count 27*L, 

Mean Platelet Volume 12.2, Immature Granulocyte % (Auto) 0, Neutrophils (%) 

(Auto) 67, Lymphocytes (%) (Auto) 18, Monocytes (%) (Auto) 14H, Eosinophils (%) 

(Auto) 1, Basophils (%) (Auto) 0, Neutrophils # (Auto) 4.9, Lymphocytes # (Auto)

1.3, Monocytes # (Auto) 1.0, Eosinophils # (Auto) 0.1, Basophils # (Auto) 0.0, 

Immature Granulocyte # (Auto) 0.0, Sodium Level 139, Potassium Level 3.7, 

Chloride Level 104, Carbon Dioxide Level 23, Anion Gap 12, Blood Urea Nitrogen 

66H, Creatinine 2.48H, Estimat Glomerular Filtration Rate 26, BUN/Creatinine 

Ratio 27, Glucose Level 133H, Calcium Level 7.1L, Corrected Calcium 8.6, 

Phosphorus Level 3.5, Magnesium Level 1.9, Total Bilirubin 2.2H, Aspartate Amino

Transf (AST/SGOT) 71H, Alanine Aminotransferase (ALT/SGPT) 50, Alkaline 

Phosphatase 103, Total Protein 4.2L, Albumin 2.1L


5/2/22 12:22: Glucometer 178H


5/2/22 17:49: Glucometer 147H





Microbiology


4/23/22 MRSA Screen - Final, Complete


          MRSA not isolated


4/23/22 Gram Stain - Final, Complete


          


4/23/22 Wound Culture - Final, Complete


          Mixed Bacterial Marcella


          Enterococcus species


4/23/22 Blood Culture - Final, Complete


          No growth





Assessment/Plan


Assessment/Plan


Assess & Plan/Chief Complaint


liver cirrhosis s/p lap cholecystectomy 4/7/22.


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


will proceed with placement interrupted figure of 8 sutures across umbilical 

wound. 


low protein diet.


Na and fluid restriction.


cont spironolactone. 


continues to be hypotensive and tachycardic. will get CT chest/abd/pelvis.


will proceed with paracentesis.











BRENDA WELCH MD                 May 2, 2022 19:15

## 2022-05-03 LAB
ALBUMIN SERPL-MCNC: 1.9 GM/DL (ref 3.2–4.5)
ALP SERPL-CCNC: 98 U/L (ref 40–136)
ALT SERPL-CCNC: 49 U/L (ref 0–55)
BASOPHILS # BLD AUTO: 0 10^3/UL (ref 0–0.1)
BASOPHILS NFR BLD AUTO: 0 % (ref 0–10)
BILIRUB SERPL-MCNC: 2.2 MG/DL (ref 0.1–1)
BUN/CREAT SERPL: 31
CALCIUM SERPL-MCNC: 7 MG/DL (ref 8.5–10.1)
CHLORIDE SERPL-SCNC: 103 MMOL/L (ref 98–107)
CO2 SERPL-SCNC: 25 MMOL/L (ref 21–32)
CREAT SERPL-MCNC: 2.28 MG/DL (ref 0.6–1.3)
EOSINOPHIL # BLD AUTO: 0.1 10^3/UL (ref 0–0.3)
EOSINOPHIL NFR BLD AUTO: 1 % (ref 0–10)
GFR SERPLBLD BASED ON 1.73 SQ M-ARVRAT: 28 ML/MIN
GLUCOSE SERPL-MCNC: 127 MG/DL (ref 70–105)
HCT VFR BLD CALC: 25 % (ref 40–54)
HGB BLD-MCNC: 8.9 G/DL (ref 13.3–17.7)
LYMPHOCYTES # BLD AUTO: 1.6 10^3/UL (ref 1–4)
LYMPHOCYTES NFR BLD AUTO: 20 % (ref 12–44)
MAGNESIUM SERPL-MCNC: 2 MG/DL (ref 1.6–2.4)
MANUAL DIFFERENTIAL PERFORMED BLD QL: NO
MCH RBC QN AUTO: 42 PG (ref 25–34)
MCHC RBC AUTO-ENTMCNC: 36 G/DL (ref 32–36)
MCV RBC AUTO: 117 FL (ref 80–99)
MONOCYTES # BLD AUTO: 1.2 10^3/UL (ref 0–1)
MONOCYTES NFR BLD AUTO: 14 % (ref 0–12)
NEUTROPHILS # BLD AUTO: 5.3 10^3/UL (ref 1.8–7.8)
NEUTROPHILS NFR BLD AUTO: 65 % (ref 42–75)
PHOSPHATE SERPL-MCNC: 4.1 MG/DL (ref 2.3–4.7)
PLATELET # BLD: 28 10^3/UL (ref 130–400)
PMV BLD AUTO: 12.6 FL (ref 9–12.2)
POTASSIUM SERPL-SCNC: 3.4 MMOL/L (ref 3.6–5)
PROT SERPL-MCNC: 4.4 GM/DL (ref 6.4–8.2)
SODIUM SERPL-SCNC: 140 MMOL/L (ref 135–145)
WBC # BLD AUTO: 8.2 10^3/UL (ref 4.3–11)

## 2022-05-03 RX ADMIN — SODIUM CHLORIDE SCH MLS/HR: 4.5 INJECTION, SOLUTION INTRAVENOUS at 10:33

## 2022-05-03 RX ADMIN — SODIUM CHLORIDE SCH MLS/HR: 4.5 INJECTION, SOLUTION INTRAVENOUS at 22:44

## 2022-05-03 RX ADMIN — PANTOPRAZOLE SODIUM SCH MG: 40 INJECTION, POWDER, FOR SOLUTION INTRAVENOUS at 21:23

## 2022-05-03 RX ADMIN — MIDODRINE HYDROCHLORIDE SCH MG: 10 TABLET ORAL at 18:40

## 2022-05-03 RX ADMIN — FOLIC ACID SCH MG: 1 TABLET ORAL at 08:10

## 2022-05-03 RX ADMIN — MIDODRINE HYDROCHLORIDE SCH MG: 10 TABLET ORAL at 08:10

## 2022-05-03 RX ADMIN — VASOPRESSIN SCH MLS/HR: 20 INJECTION INTRAVENOUS at 21:42

## 2022-05-03 RX ADMIN — PANTOPRAZOLE SODIUM SCH MG: 40 INJECTION, POWDER, FOR SOLUTION INTRAVENOUS at 08:10

## 2022-05-03 RX ADMIN — VASOPRESSIN SCH MLS/HR: 20 INJECTION INTRAVENOUS at 10:33

## 2022-05-03 RX ADMIN — MIDODRINE HYDROCHLORIDE SCH MG: 10 TABLET ORAL at 13:19

## 2022-05-03 RX ADMIN — NOREPINEPHRINE BITARTRATE SCH MLS/HR: 1 INJECTION, SOLUTION, CONCENTRATE INTRAVENOUS at 16:35

## 2022-05-03 RX ADMIN — Medication SCH MG: at 08:10

## 2022-05-03 NOTE — PROGRESS NOTE - CARDIOLOGY
Cardiology SOAP Progress Note


Subjective:


No cp or palp or syncope


Gen malaise present





Objective:


I&O/Vital Signs











 5/3/22 5/3/22 5/3/22 5/3/22





 05:00 06:00 07:00 07:00


 


Pulse 98 98 102 102


 


Resp 15 15 16 


 


B/P (MAP) 92/55 85/54 89/56 


 


Pulse Ox 96 94 95 


 


O2 Delivery Room Air Room Air Room Air 





 5/3/22 5/3/22 5/3/22 5/3/22





 07:27 08:00 08:00 09:00


 


Temp 36.2   


 


Pulse   102 116


 


Resp   17 16


 


B/P (MAP)   94/55 100/65


 


Pulse Ox  97 97 99


 


O2 Delivery  Room Air Room Air Room Air


 


    





 5/3/22 5/3/22 5/3/22 5/3/22





 10:00 10:33 11:00 11:37


 


Temp    36.2


 


Pulse 110 111 106 


 


Resp 24  10 


 


B/P (MAP) 97/64 109/62 95/60 


 


Pulse Ox 100  100 


 


O2 Delivery Room Air  Room Air 


 


    





 5/3/22 5/3/22 5/3/22 5/3/22





 11:57 12:00 13:00 13:00


 


Pulse  105 104 105


 


Resp  18  13


 


B/P (MAP)  96/61  106/65


 


Pulse Ox 97 96  100


 


O2 Delivery Room Air Room Air  Room Air





 5/3/22 5/3/22 5/3/22 5/3/22





 14:00 15:00 16:00 16:00


 


Pulse 114 102  107


 


Resp 23 14  17


 


B/P (MAP) 128/70 101/62  103/62


 


Pulse Ox 99 100 97 100


 


O2 Delivery Room Air Room Air Room Air Room Air





 5/3/22   





 16:00   


 


Temp 36.0   














 5/3/22





 00:00


 


Intake Total 330 ml


 


Output Total 2175 ml


 


Balance -1845 ml








Weight (Pounds):  179


Weight (Ounces):  0.0


Weight (Calculated Kilograms):  81.925780


Constitutional:  AAO x 3, well-developed, well-nourished


Respiratory:  other (diminished air entry especially at the bases)


Cardiovascular:  regular rate-rhythm, S1 and S2, systolic murmur (soft JOESPH at 

card base)


Gastrointestional:  distended, other (peritoneal drain in place)


Extremities:  swelling (mod edema of all extremities)


Neurologic/Psychiatric:  oriented x 3, other (moves all limbs)


Skin:  No rash on exposed areas, No ulcerations on exposed areas





Results/Procedures:


Labs


Laboratory Tests


5/2/22 17:49: Glucometer 147H


5/3/22 00:22: Glucometer 131H


5/3/22 04:05: 


White Blood Count 8.2, Red Blood Count 2.13L, Hemoglobin 8.9L, Hematocrit 25L, 

Mean Corpuscular Volume 117H, Mean Corpuscular Hemoglobin 42H, Mean Corpuscular 

Hemoglobin Concent 36, Red Cell Distribution Width 14.6H, Platelet Count 28*L, 

Mean Platelet Volume 12.6H, Immature Granulocyte % (Auto) 1, Neutrophils (%) 

(Auto) 65, Lymphocytes (%) (Auto) 20, Monocytes (%) (Auto) 14H, Eosinophils (%) 

(Auto) 1, Basophils (%) (Auto) 0, Neutrophils # (Auto) 5.3, Lymphocytes # (Auto)

1.6, Monocytes # (Auto) 1.2H, Eosinophils # (Auto) 0.1, Basophils # (Auto) 0.0, 

Immature Granulocyte # (Auto) 0.0, Sodium Level 140, Potassium Level 3.4L, 

Chloride Level 103, Carbon Dioxide Level 25, Anion Gap 12, Blood Urea Nitrogen 

70H, Creatinine 2.28H, Estimat Glomerular Filtration Rate 28, BUN/Creatinine 

Ratio 31, Glucose Level 127H, Calcium Level 7.0L, Corrected Calcium 8.7, 

Phosphorus Level 4.1, Magnesium Level 2.0, Total Bilirubin 2.2H, Aspartate Amino

Transf (AST/SGOT) 76H, Alanine Aminotransferase (ALT/SGPT) 49, Alkaline 

Phosphatase 98, Total Protein 4.4L, Albumin 1.9L


5/3/22 11:24: Glucometer 151H





Microbiology


4/23/22 MRSA Screen - Final, Complete


          MRSA not isolated


4/23/22 Gram Stain - Final, Complete


          


4/23/22 Wound Culture - Final, Complete


          Mixed Bacterial Marcella


          Enterococcus species


4/23/22 Blood Culture - Final, Complete


          No growth





Laboratory Tests


5/2/22 03:55








5/3/22 04:05











A/P:


Assessment:


Acute on chronic hepatic failure, complicated by the following:


* Sepsis and septic shock


* Probable peritonitis following recent cholecystectomy - peritoneal drain in 

  place


* Generalized anasarca likely due to hypoproteinemia


* Probable hepatorenal syndrome and acute kidney injury


* Probable DIC


* Severe metabolic acidosis - improved/resolved with IVF of 1/2 NS with Bicarb





H/O heavy ETOH usage





CAD


- Cardiac cath of 3-1-16 (following a 3-beat run of WCT on 2/19/16) showed 

angiographically mild CAD. Normal global LV systolic function with an LVEF of 

approx 60%. Normal LVEDP. No significant MR.


- MPI of 6/18/19 showed no ischemia or infarction and EF 72%


- Echo on 6/20/19: LVEF 60-65%, grade 1 hunter dysfunction, mild MR< mild TR, RVSP

30 mmHg





H/o calcific densities in the pulmonary fields for which we states he been 

following with his PCP.


- States he has h/o histoplasmosis





Carotid dz


- Carotid u/s on 6-4-21: minimal plaque





Hyperlipidemia


- Not suitable for statin therapy due to chronic liver enz elev





Fam h/o early CAD





Poor prognosis


Plan:





* Complex management and poor prognosis


* Sinus tach - improved after correction of acidois


* Still requiring pressor support


* Management of sepsis per medical/surgical/eICU services


* End stage liver dz with DIC - management per medical/surgical services


* Longterm prognosis appears poor


* Replace electrolytes











DAYANA FOURNIER MD FACP FAC CCDS    May 3, 2022 16:57

## 2022-05-03 NOTE — TELE-ICU PROGRESS NOTE
Subjective


Date Seen by a Provider:  May 3, 2022


Time Seen by a Provider:  08:15


Subjective/Events-last exam


This virtual visit was conducted using real time audio/video.


Thank you for asking us to see this patient for sepsis.


Paracentesis catheter placed 4/30/22. Copious output. S/B Hem. Palliative 

consult recommended.


PE: VSS.  O2 sat 96% on RA





HEENT: No obvious masses, adenopathy or JVD.


              Chest: clear to auscultation.


              CV: RRR S1 S2 No murmur or added sounds.


              Abd: Non-tender. Bowel sounds Y.


              : Unremarkable. Purdy Y.


              CNS/psychiatric: Grossly intact. No obvious focal findings.


              Extremities: 1-2+ edema. Capillary refill < 3 seconds.


              Skin: unremarkable.





Results: Elevated  BUN 70, Creat 2.28.   Decreased Hb 8.4, plts 28K., Alb 1.9.  

CXR: clear.


Available chart/ vitals / labs / images reviewed.


Video assessment done using teleICU camera, rest of exam as per RN.





A/P: Critical Care: critically ill patient. Cont.pressors., SCDs, thiamine, 

folate, bicarb drip, midodrine. Wean pressors as sanna.


Hem consult w low plts: Palliative care recommended.





Discussed with RN MAURICIO. Asked RN to reach out to eICU if any questions or concerns

later. 


Time spent with patient/coordination of care with other health professionals (mi

ns): 15





Sepsis Event


Evaluation


Height, Weight, BMI


Height: 5'9.00"


Weight: 179lbs. 0.0oz. 81.745913nz; 32.72 BMI


Method:





Exam


Exam


Patient acknowledged, consented, and participated in this virtual visit which 

was conducted using real time audio/video


Vital Signs








  Date Time  Temp Pulse Resp B/P (MAP) Pulse Ox O2 Delivery O2 Flow Rate FiO2


 


5/3/22 09:00  116 16 100/65 99 Room Air  


 


5/3/22 08:00  102 17 94/55 97 Room Air  


 


5/3/22 08:00     97 Room Air  


 


5/3/22 07:27 36.2       


 


5/3/22 07:00  102      


 


5/3/22 07:00  102 16 89/56 95 Room Air  


 


5/3/22 06:00  98 15 85/54 94 Room Air  


 


5/3/22 05:00  98 15 92/55 96 Room Air  


 


5/3/22 04:00  112 12 93/58 100 Room Air  


 


5/3/22 04:00     98 Room Air  


 


5/3/22 03:00  100 17 98/55 96 Room Air  


 


5/3/22 02:00  99 17 88/52 100 Room Air  


 


5/3/22 01:00  107 17 95/56 99 Room Air  


 


5/3/22 01:00  107      


 


5/3/22 00:00  101 19 92/61 98 Room Air  


 


5/3/22 00:00     100 Room Air  


 


5/2/22 23:00  105 19 94/62 91 Room Air  


 


5/2/22 22:44 36.5       


 


5/2/22 22:39    103/62    


 


5/2/22 22:00  112 18 97/60 100 Room Air  


 


5/2/22 21:00  116 19 99/64 100 Room Air  


 


5/2/22 20:00     100 Room Air  


 


5/2/22 20:00 36.9       


 


5/2/22 20:00  116 25 94/65 95 Room Air  


 


5/2/22 19:00  116 30 91/61 100 Room Air  


 


5/2/22 19:00  116      


 


5/2/22 18:00  116 16 96/59 98 Room Air  


 


5/2/22 17:00  116 24 92/60 99 Room Air  


 


5/2/22 16:00     97 Room Air  


 


5/2/22 16:00  107 15 94/55 100 Room Air  


 


5/2/22 15:56 36.3       


 


5/2/22 15:00  115 18 125/89 100 Room Air  


 


5/2/22 14:00  114 23 93/54 100 Room Air  


 


5/2/22 13:00  113      


 


5/2/22 13:00  107 15 107/62 97 Room Air  


 


5/2/22 12:00     97 Room Air  


 


5/2/22 12:00  111 14 98/55 97 Room Air  


 


5/2/22 12:00 36.3       


 


5/2/22 11:47  114  85/55    


 


5/2/22 11:00  110 14 109/63 99 Room Air  


 


5/2/22 10:00  103 17 92/62 100 Room Air  














I & O 


 


 5/3/22





 07:00


 


Intake Total 730 ml


 


Output Total 5850 ml


 


Balance -5120 ml








Height & Weight


Height: 5'9.00"


Weight: 179lbs. 0.0oz. 81.749204gl; 32.72 BMI


Method:


General Appearance:  No Apparent Distress, Chronically ill, Thin


HEENT:  PERRL/EOMI


Neck:  Non Tender


Respiratory:  Lungs Clear, No Respiratory Distress


Cardiovascular:  Regular Rate, Rhythm, No Murmur


Capillary Refill:  Less Than 3 Seconds


Peripheral Pulses:  2+ Radial Pulses (R), 2+ Radial Pulses (L)


Gastrointestinal:  soft, distended


Extremity:  Normal Capillary Refill


Neurologic/Psychiatric:  Alert, Oriented x3 (to major details, pleasantly 

confused otherwise)


Skin:  Normal Color


Lymphatic:  No Adenopathy





Results


Lab


Laboratory Tests


5/2/22 03:55








5/3/22 04:05











Assessment/Plan


Assessment/Plan


See free text.


Critical Care:  Critically Ill Patient











DIANE ABEL MD           May 3, 2022 09:22

## 2022-05-03 NOTE — PROGRESS NOTE
Subjective


Date Seen by a Provider:  May 3, 2022


Time Seen by a Provider:  13:00


Subjective/Events-last exam


clinical status unchanged. awake and alert however confused.





Objective


Exam





Vital Signs








  Date Time  Temp Pulse Resp B/P (MAP) Pulse Ox O2 Delivery O2 Flow Rate FiO2


 


5/3/22 16:00 36.0       


 


5/3/22 16:00  107 17 103/62 100 Room Air  


 


5/3/22 16:00     97 Room Air  


 


5/3/22 15:00  102 14 101/62 100 Room Air  


 


5/3/22 14:00  114 23 128/70 99 Room Air  


 


5/3/22 13:00  105 13 106/65 100 Room Air  


 


5/3/22 13:00  104      


 


5/3/22 12:00  105 18 96/61 96 Room Air  


 


5/3/22 11:57     97 Room Air  


 


5/3/22 11:37 36.2       


 


5/3/22 11:00  106 10 95/60 100 Room Air  


 


5/3/22 10:33  111  109/62    


 


5/3/22 10:00  110 24 97/64 100 Room Air  


 


5/3/22 09:00  116 16 100/65 99 Room Air  


 


5/3/22 08:00  102 17 94/55 97 Room Air  


 


5/3/22 08:00     97 Room Air  


 


5/3/22 07:27 36.2       


 


5/3/22 07:00  102      


 


5/3/22 07:00  102 16 89/56 95 Room Air  


 


5/3/22 06:00  98 15 85/54 94 Room Air  


 


5/3/22 05:00  98 15 92/55 96 Room Air  


 


5/3/22 04:00  112 12 93/58 100 Room Air  


 


5/3/22 04:00     98 Room Air  


 


5/3/22 03:00  100 17 98/55 96 Room Air  


 


5/3/22 02:00  99 17 88/52 100 Room Air  


 


5/3/22 01:00  107 17 95/56 99 Room Air  


 


5/3/22 01:00  107      


 


5/3/22 00:00  101 19 92/61 98 Room Air  


 


5/3/22 00:00     100 Room Air  


 


5/2/22 23:00  105 19 94/62 91 Room Air  


 


5/2/22 22:44 36.5       


 


5/2/22 22:39    103/62    


 


5/2/22 22:00  112 18 97/60 100 Room Air  


 


5/2/22 21:00  116 19 99/64 100 Room Air  


 


5/2/22 20:00     100 Room Air  


 


5/2/22 20:00 36.9       


 


5/2/22 20:00  116 25 94/65 95 Room Air  


 


5/2/22 19:00  116 30 91/61 100 Room Air  


 


5/2/22 19:00  116      


 


5/2/22 18:00  116 16 96/59 98 Room Air  


 


5/2/22 17:00  116 24 92/60 99 Room Air  














I & O 


 


 5/3/22





 07:00


 


Intake Total 730 ml


 


Output Total 5850 ml


 


Balance -5120 ml





Capillary Refill : Less Than 3 Seconds


General Appearance:  No Apparent Distress


HEENT:  PERRL/EOMI


Neck:  Full Range of Motion


Respiratory:  Chest Non Tender, Decreased Breath Sounds


Cardiovascular:  Regular Rate, Rhythm


Gastrointestinal:  normal bowel sounds, soft


Extremity:  Normal Capillary Refill


Neurologic/Psychiatric:  Alert, Disoriented


Skin:  Jaundice


Lymphatic:  No Adenopathy





Results


Lab


Laboratory Tests


5/2/22 17:49: Glucometer 147H


5/3/22 00:22: Glucometer 131H


5/3/22 04:05: 


White Blood Count 8.2, Red Blood Count 2.13L, Hemoglobin 8.9L, Hematocrit 25L, 

Mean Corpuscular Volume 117H, Mean Corpuscular Hemoglobin 42H, Mean Corpuscular 

Hemoglobin Concent 36, Red Cell Distribution Width 14.6H, Platelet Count 28*L, 

Mean Platelet Volume 12.6H, Immature Granulocyte % (Auto) 1, Neutrophils (%) 

(Auto) 65, Lymphocytes (%) (Auto) 20, Monocytes (%) (Auto) 14H, Eosinophils (%) 

(Auto) 1, Basophils (%) (Auto) 0, Neutrophils # (Auto) 5.3, Lymphocytes # (Auto)

1.6, Monocytes # (Auto) 1.2H, Eosinophils # (Auto) 0.1, Basophils # (Auto) 0.0, 

Immature Granulocyte # (Auto) 0.0, Sodium Level 140, Potassium Level 3.4L, 

Chloride Level 103, Carbon Dioxide Level 25, Anion Gap 12, Blood Urea Nitrogen 

70H, Creatinine 2.28H, Estimat Glomerular Filtration Rate 28, BUN/Creatinine 

Ratio 31, Glucose Level 127H, Calcium Level 7.0L, Corrected Calcium 8.7, 

Phosphorus Level 4.1, Magnesium Level 2.0, Total Bilirubin 2.2H, Aspartate Amino

Transf (AST/SGOT) 76H, Alanine Aminotransferase (ALT/SGPT) 49, Alkaline 

Phosphatase 98, Total Protein 4.4L, Albumin 1.9L


5/3/22 11:24: Glucometer 151H





Microbiology


4/23/22 MRSA Screen - Final, Complete


          MRSA not isolated


4/23/22 Gram Stain - Final, Complete


          


4/23/22 Wound Culture - Final, Complete


          Mixed Bacterial Marcella


          Enterococcus species


4/23/22 Blood Culture - Final, Complete


          No growth





Assessment/Plan


Assessment/Plan


Assess & Plan/Chief Complaint


liver cirrhosis s/p lap cholecystectomy 4/7/22.


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


will proceed with placement interrupted figure of 8 sutures across umbilical 

wound. 


low protein diet.


Na and fluid restriction.


cont spironolactone. 


continues to be hypotensive and tachycardic. will get CT chest/abd/pelvis.


will proceed with paracentesis.


looks more like DIC picture. pt a DNR-CC











BRENDA WELCH MD                 May 3, 2022 16:56

## 2022-05-03 NOTE — PROGRESS NOTE - HOSPITALIST
Subjective


HPI/CC On Admission


Date Seen by Provider:  May 3, 2022


Time Seen by Provider:  08:41


Patient is 81-year-old  male past medical history of cirrhosis, 

hypertension, and recent cholecystectomy who presented to the emergency room due

to drainage from surgical wounds and low blood pressure.  He is a very poor 

historian but per the ER note he also complained of nausea and decreased 

appetite.  He told me his surgery was 2 years ago though records reveal it was 

much more recent than that (4/7).  He does not complain of any of this to me and

states he is feeling very well.  When asked why he came to the hospital then if 

he was feeling well he states because his sister is a nurse.  Reviewing records 

reveals he was seen in the emergency department last week for drainage from 

surgical incisions and a suture was placed.  He saw Dr. WELCH's nurse 

practitioner on April 20 for removal.  Since then he has continued to have 

drainage.  He also continues to drink 1-2 glasses of wine a day.


Subjective/Events-last exam


Pt reports doing well. No complaints. Being moved up in bed by nurse and nursing

student. No family at bedside. When asked if he needed anything he asked for a 

glass of wine.





Objective


Exam


Vital Signs





Vital Signs








  Date Time  Temp Pulse Resp B/P (MAP) Pulse Ox O2 Delivery O2 Flow Rate FiO2


 


5/3/22 08:00     97 Room Air  


 


5/3/22 07:27 36.2       


 


5/3/22 07:00  102      


 


5/3/22 06:00   15 85/54    


 


5/2/22 03:00       2.00 





Capillary Refill : Less Than 3 Seconds


General Appearance:  No Apparent Distress, Chronically ill, Thin


Respiratory:  Lungs Clear, No Respiratory Distress


Cardiovascular:  Regular Rate, Rhythm, No Murmur


Gastrointestinal:  Normal Bowel Sounds, Distended


Neurologic/Psychiatric:  Alert, Oriented x3 (to major details, pleasantly 

confused otherwise)





Results/Procedures


Lab


Laboratory Tests


5/3/22 04:05








Patient resulted labs reviewed.


Imaging:  Reviewed Imaging Report





Assessment/Plan


Assessment and Plan


Assess & Plan/Chief Complaint


Septic shock


Oliguric RITA


Hepatorenal syndrome


Recent cholecystectomy


Possible SBP


Thrombocytopenia


DIC


Melena


End stage liver disease


Decompensated cirrhosis


Poor prognosis


Goals of care discussion


   Cultures with Enterococcus 


   CT chest/abdomen with moderate ascites- Surgery placed drain over the weekend


   Completed Unasyn per c/s


   Continue IVF with bicarb


   Continue pressors- still on 2, on oral midodrine as well- will discuss with 

pharmacy and see if we can increase it further


   Peritoneal catheter in place, draining


   IV PPI BID


   TeleICU following


   Surgery following


   Cardiology following


   Palliative care following- would be a good candidate for hospice though 

family not agreeable at this time





DVT ppx: SCDs duet to thrombocytopenia





Critical Care


Critically Ill Patient











HALEY STEVE MD               May 3, 2022 08:43

## 2022-05-03 NOTE — PROGRESS NOTE - CARDIOLOGY
Cardiology SOAP Progress Note


Subjective:


In bed


Denies c/o CP or SOB


States he wants to go home





Objective:


I&O/Vital Signs











 5/3/22 5/3/22 5/3/22 5/3/22





 21:00 21:42 22:00 23:00


 


Pulse 105 111 105 106


 


Resp 17  14 15


 


B/P (MAP) 92/61 100/62 86/54 89/57


 


Pulse Ox 94  99 96


 


O2 Delivery Room Air  Room Air Room Air





 5/4/22 5/4/22 5/4/22 5/4/22





 00:00 00:00 01:00 01:00


 


Pulse  98 101 101


 


Resp  20  17


 


B/P (MAP)  96/61  86/56


 


Pulse Ox 98 97  985


 


O2 Delivery Room Air Room Air  Room Air





 5/4/22 5/4/22 5/4/22 5/4/22





 02:00 03:00 04:00 04:00


 


Pulse 103 109 103 


 


Resp 13 20 16 


 


B/P (MAP) 93/72 90/55 90/53 


 


Pulse Ox 96 97 93 100


 


O2 Delivery Room Air Room Air Room Air Room Air





 5/4/22 5/4/22 5/4/22 5/4/22





 04:15 05:00 06:00 07:00


 


Temp 36.3   


 


Pulse  109 105 104


 


Resp  20 20 


 


B/P (MAP)  103/60 108/68 


 


Pulse Ox  96 98 


 


O2 Delivery  Room Air Room Air 


 


    





 5/4/22 5/4/22  





 07:08 07:58  


 


Temp  36.6  


 


Pulse 105   


 


B/P (MAP) 97/65   














 5/4/22





 00:00


 


Intake Total 600 ml


 


Output Total 1600 ml


 


Balance -1000 ml








Weight (Pounds):  179


Weight (Ounces):  0.0


Weight (Calculated Kilograms):  81.205566


Constitutional:  AAO x 3, well-developed, well-nourished


Respiratory:  other (diminished air entry especially at the bases)


Cardiovascular:  regular rate-rhythm, S1 and S2, systolic murmur (soft JOESPH at 

card base)


Gastrointestional:  distended, other (peritoneal drain in place)


Extremities:  swelling (mod edema of all extremities)


Neurologic/Psychiatric:  oriented x 3, other (moves all limbs)


Skin:  No rash on exposed areas, No ulcerations on exposed areas





Results/Procedures:


Labs


Laboratory Tests


5/3/22 11:24: Glucometer 151H


5/3/22 18:39: Glucometer 176H


5/4/22 00:25: Glucometer 176H


5/4/22 04:41: 


White Blood Count 10.3, Red Blood Count 2.29L, Hemoglobin 9.6L, Hematocrit 27L, 

Mean Corpuscular Volume 116H, Mean Corpuscular Hemoglobin 42H, Mean Corpuscular 

Hemoglobin Concent 36, Red Cell Distribution Width 14.7H, Platelet Count 38*L, 

Mean Platelet Volume 12.4H, Immature Granulocyte % (Auto) 1, Neutrophils (%) 

(Auto) 68, Lymphocytes (%) (Auto) 15, Monocytes (%) (Auto) 15H, Eosinophils (%) 

(Auto) 1, Basophils (%) (Auto) 0, Neutrophils # (Auto) 7.1, Lymphocytes # (Auto)

1.6, Monocytes # (Auto) 1.5H, Eosinophils # (Auto) 0.1, Basophils # (Auto) 0.0, 

Immature Granulocyte # (Auto) 0.1, Percent Immature Platelet Fraction 9.2H, 

Sodium Level 138, Potassium Level 3.1L, Chloride Level 100, Carbon Dioxide Level

26, Anion Gap 12, Blood Urea Nitrogen 65H, Creatinine 1.91H, Estimat Glomerular 

Filtration Rate 35, BUN/Creatinine Ratio 34, Glucose Level 142H, Calcium Level 

7.3L, Corrected Calcium 8.9, Phosphorus Level 3.9, Magnesium Level 1.9, Total 

Bilirubin 2.4H, Aspartate Amino Transf (AST/SGOT) 83H, Alanine Aminotransferase 

(ALT/SGPT) 50, Alkaline Phosphatase 99, Total Protein 4.8L, Albumin 2.0L





Microbiology


4/23/22 MRSA Screen - Final, Complete


          MRSA not isolated


4/23/22 Gram Stain - Final, Complete


          


4/23/22 Wound Culture - Final, Complete


          Mixed Bacterial Marcella


          Enterococcus species


4/23/22 Blood Culture - Final, Complete


          No growth








A/P:


Assessment:


Acute on chronic hepatic failure, complicated by the following:


* Sepsis and septic shock


* Probable peritonitis following recent cholecystectomy - peritoneal drain in 

  place


* Generalized anasarca likely due to hypoproteinemia


* Probable hepatorenal syndrome and acute kidney injury


* Probable DIC


* Severe metabolic acidosis - continue IVF of 1/2 NS with Bicarb





H/O heavy ETOH usage





CAD


- Cardiac cath of 3-1-16 (following a 3-beat run of WCT on 2/19/16) showed 

angiographically mild CAD. Normal global LV systolic function with an LVEF of 

approx 60%. Normal LVEDP. No significant MR.


- MPI of 6/18/19 showed no ischemia or infarction and EF 72%


- Echo on 6/20/19: LVEF 60-65%, grade 1 hunter dysfunction, mild MR< mild TR, RVSP

30 mmHg





H/o calcific densities in the pulmonary fields for which we states he been 

following with his PCP.


- States he has h/o histoplasmosis





Carotid dz


- Carotid u/s on 6-4-21: minimal plaque





Hyperlipidemia


- Not suitable for statin therapy due to chronic liver enz elev





Fam h/o early CAD





Poor prognosis


Plan:





* Complex management and poor prognosis


* Sinus tach - improved


* Requiring pressor support


* Management of sepsis per medical/surgical/eICU services


* End stage liver dz with DIC - management per medical/surgical services


* Family ongoing discussions with palliative care


* Replace electrolytes











VANESSA MELGOZA            May 3, 2022 08:44

## 2022-05-04 LAB
ALBUMIN SERPL-MCNC: 2 GM/DL (ref 3.2–4.5)
ALP SERPL-CCNC: 99 U/L (ref 40–136)
ALT SERPL-CCNC: 50 U/L (ref 0–55)
BASOPHILS # BLD AUTO: 0 10^3/UL (ref 0–0.1)
BASOPHILS NFR BLD AUTO: 0 % (ref 0–10)
BILIRUB SERPL-MCNC: 2.4 MG/DL (ref 0.1–1)
BUN/CREAT SERPL: 34
CALCIUM SERPL-MCNC: 7.3 MG/DL (ref 8.5–10.1)
CHLORIDE SERPL-SCNC: 100 MMOL/L (ref 98–107)
CO2 SERPL-SCNC: 26 MMOL/L (ref 21–32)
CREAT SERPL-MCNC: 1.91 MG/DL (ref 0.6–1.3)
EOSINOPHIL # BLD AUTO: 0.1 10^3/UL (ref 0–0.3)
EOSINOPHIL NFR BLD AUTO: 1 % (ref 0–10)
GFR SERPLBLD BASED ON 1.73 SQ M-ARVRAT: 35 ML/MIN
GLUCOSE SERPL-MCNC: 142 MG/DL (ref 70–105)
HCT VFR BLD CALC: 27 % (ref 40–54)
HGB BLD-MCNC: 9.6 G/DL (ref 13.3–17.7)
LYMPHOCYTES # BLD AUTO: 1.6 10^3/UL (ref 1–4)
LYMPHOCYTES NFR BLD AUTO: 15 % (ref 12–44)
MAGNESIUM SERPL-MCNC: 1.9 MG/DL (ref 1.6–2.4)
MANUAL DIFFERENTIAL PERFORMED BLD QL: NO
MCH RBC QN AUTO: 42 PG (ref 25–34)
MCHC RBC AUTO-ENTMCNC: 36 G/DL (ref 32–36)
MCV RBC AUTO: 116 FL (ref 80–99)
MONOCYTES # BLD AUTO: 1.5 10^3/UL (ref 0–1)
MONOCYTES NFR BLD AUTO: 15 % (ref 0–12)
NEUTROPHILS # BLD AUTO: 7.1 10^3/UL (ref 1.8–7.8)
NEUTROPHILS NFR BLD AUTO: 68 % (ref 42–75)
PHOSPHATE SERPL-MCNC: 3.9 MG/DL (ref 2.3–4.7)
PLATELET # BLD: 38 10^3/UL (ref 130–400)
PMV BLD AUTO: 12.4 FL (ref 9–12.2)
POTASSIUM SERPL-SCNC: 3.1 MMOL/L (ref 3.6–5)
PROT SERPL-MCNC: 4.8 GM/DL (ref 6.4–8.2)
SODIUM SERPL-SCNC: 138 MMOL/L (ref 135–145)
WBC # BLD AUTO: 10.3 10^3/UL (ref 4.3–11)

## 2022-05-04 RX ADMIN — NOREPINEPHRINE BITARTRATE SCH MLS/HR: 1 INJECTION, SOLUTION, CONCENTRATE INTRAVENOUS at 03:02

## 2022-05-04 RX ADMIN — Medication SCH MG: at 06:47

## 2022-05-04 RX ADMIN — PANTOPRAZOLE SODIUM SCH MG: 40 INJECTION, POWDER, FOR SOLUTION INTRAVENOUS at 08:45

## 2022-05-04 RX ADMIN — FOLIC ACID SCH MG: 1 TABLET ORAL at 08:44

## 2022-05-04 RX ADMIN — SODIUM CHLORIDE, SODIUM LACTATE, POTASSIUM CHLORIDE, AND CALCIUM CHLORIDE SCH MLS/HR: 600; 310; 30; 20 INJECTION, SOLUTION INTRAVENOUS at 06:46

## 2022-05-04 RX ADMIN — SODIUM CHLORIDE, SODIUM LACTATE, POTASSIUM CHLORIDE, AND CALCIUM CHLORIDE SCH MLS/HR: 600; 310; 30; 20 INJECTION, SOLUTION INTRAVENOUS at 00:07

## 2022-05-04 RX ADMIN — VASOPRESSIN SCH MLS/HR: 20 INJECTION INTRAVENOUS at 07:08

## 2022-05-04 RX ADMIN — MIDODRINE HYDROCHLORIDE SCH MG: 10 TABLET ORAL at 13:16

## 2022-05-04 RX ADMIN — SODIUM CHLORIDE, SODIUM LACTATE, POTASSIUM CHLORIDE, AND CALCIUM CHLORIDE SCH MLS/HR: 600; 310; 30; 20 INJECTION, SOLUTION INTRAVENOUS at 06:21

## 2022-05-04 RX ADMIN — MIDODRINE HYDROCHLORIDE SCH MG: 10 TABLET ORAL at 08:45

## 2022-05-04 NOTE — PROGRESS NOTE - CARDIOLOGY
Cardiology SOAP Progress Note


Subjective:


Gen malaise and weakness present


No cp or palp or syncope 


No n/v





Objective:


I&O/Vital Signs











 5/3/22 5/3/22 5/3/22 5/4/22





 21:42 22:00 23:00 00:00


 


Pulse 111 105 106 


 


Resp  14 15 


 


B/P (MAP) 100/62 86/54 89/57 


 


Pulse Ox  99 96 98


 


O2 Delivery  Room Air Room Air Room Air





 5/4/22 5/4/22 5/4/22 5/4/22





 00:00 01:00 01:00 02:00


 


Pulse 98 101 101 103


 


Resp 20  17 13


 


B/P (MAP) 96/61  86/56 93/72


 


Pulse Ox 97  985 96


 


O2 Delivery Room Air  Room Air Room Air





 5/4/22 5/4/22 5/4/22 5/4/22





 03:00 04:00 04:00 04:15


 


Temp    36.3


 


Pulse 109 103  


 


Resp 20 16  


 


B/P (MAP) 90/55 90/53  


 


Pulse Ox 97 93 100 


 


O2 Delivery Room Air Room Air Room Air 


 


    





 5/4/22 5/4/22 5/4/22 5/4/22





 05:00 06:00 07:00 07:00


 


Pulse 109 105 104 107


 


Resp 20 20  25


 


B/P (MAP) 103/60 108/68  97/65


 


Pulse Ox 96 98  100


 


O2 Delivery Room Air Room Air  Room Air





 5/4/22 5/4/22 5/4/22 





 07:08 07:58 08:00 


 


Temp  36.6  


 


Pulse 105  105 


 


Resp   15 


 


B/P (MAP) 97/65  100/54 


 


Pulse Ox   94 


 


O2 Delivery   Room Air 














 5/4/22





 00:00


 


Intake Total 600 ml


 


Output Total 1600 ml


 


Balance -1000 ml








Weight (Pounds):  179


Weight (Ounces):  0.0


Weight (Calculated Kilograms):  81.018201


Constitutional:  AAO x 3, well-developed, well-nourished


Respiratory:  other (diminished air entry especially at the bases)


Cardiovascular:  regular rate-rhythm, S1 and S2, systolic murmur (soft JOESPH at 

card base)


Gastrointestional:  distended, other (peritoneal drain in place)


Extremities:  swelling (mod edema of all extremities)


Neurologic/Psychiatric:  oriented x 3, other (moves all limbs)


Skin:  No rash on exposed areas, No ulcerations on exposed areas; other 

(multiple areas of ecchymosis to torso)





Results/Procedures:


Labs


Laboratory Tests


5/3/22 11:24: Glucometer 151H


5/3/22 18:39: Glucometer 176H


5/4/22 00:25: Glucometer 176H


5/4/22 04:41: 


White Blood Count 10.3, Red Blood Count 2.29L, Hemoglobin 9.6L, Hematocrit 27L, 

Mean Corpuscular Volume 116H, Mean Corpuscular Hemoglobin 42H, Mean Corpuscular 

Hemoglobin Concent 36, Red Cell Distribution Width 14.7H, Platelet Count 38*L, 

Mean Platelet Volume 12.4H, Immature Granulocyte % (Auto) 1, Neutrophils (%) 

(Auto) 68, Lymphocytes (%) (Auto) 15, Monocytes (%) (Auto) 15H, Eosinophils (%) 

(Auto) 1, Basophils (%) (Auto) 0, Neutrophils # (Auto) 7.1, Lymphocytes # (Auto)

1.6, Monocytes # (Auto) 1.5H, Eosinophils # (Auto) 0.1, Basophils # (Auto) 0.0, 

Immature Granulocyte # (Auto) 0.1, Percent Immature Platelet Fraction 9.2H, 

Sodium Level 138, Potassium Level 3.1L, Chloride Level 100, Carbon Dioxide Level

26, Anion Gap 12, Blood Urea Nitrogen 65H, Creatinine 1.91H, Estimat Glomerular 

Filtration Rate 35, BUN/Creatinine Ratio 34, Glucose Level 142H, Calcium Level 

7.3L, Corrected Calcium 8.9, Phosphorus Level 3.9, Magnesium Level 1.9, Total 

Bilirubin 2.4H, Aspartate Amino Transf (AST/SGOT) 83H, Alanine Aminotransferase 

(ALT/SGPT) 50, Alkaline Phosphatase 99, Total Protein 4.8L, Albumin 2.0L





Microbiology


4/23/22 MRSA Screen - Final, Complete


          MRSA not isolated


4/23/22 Gram Stain - Final, Complete


          


4/23/22 Wound Culture - Final, Complete


          Mixed Bacterial Marcella


          Enterococcus species


4/23/22 Blood Culture - Final, Complete


          No growth





Laboratory Tests


5/3/22 04:05








5/4/22 04:41











A/P:


Assessment:


Acute on chronic hepatic failure, complicated by the following:


* Sepsis and septic shock


* Probable peritonitis following recent cholecystectomy - peritoneal drain in 

  place


* Generalized anasarca likely due to hypoproteinemia


* Probable hepatorenal syndrome and acute kidney injury


* Probable DIC


* Severe metabolic acidosis - improved/resolved with IVF of 1/2 NS with Bicarb





H/O heavy ETOH usage





CAD


- Cardiac cath of 3-1-16 (following a 3-beat run of WCT on 2/19/16) showed 

angiographically mild CAD. Normal global LV systolic function with an LVEF of 

approx 60%. Normal LVEDP. No significant MR.


- MPI of 6/18/19 showed no ischemia or infarction and EF 72%


- Echo on 6/20/19: LVEF 60-65%, grade 1 hunter dysfunction, mild MR< mild TR, RVSP

30 mmHg





H/o calcific densities in the pulmonary fields for which we states he been 

following with his PCP.


- States he has h/o histoplasmosis





Carotid dz


- Carotid u/s on 6-4-21: minimal plaque





Hyperlipidemia


- Not suitable for statin therapy due to chronic liver enz elev





Fam h/o early CAD





Poor prognosis


Plan:





* Complex management and poor prognosis


* Sinus tach - improved after correction of acidois. D/c bicarb in iv


* Management of sepsis per medical/surgical/eICU services


* End stage liver dz with DIC - management per medical/surgical services


* Longterm prognosis appears poor


* Replace electrolytes











DAYANA FOURNIER MD FACP FAC CCDS    May 4, 2022 09:14

## 2022-05-04 NOTE — TELE-ICU PROGRESS NOTE
Subjective


Date Seen by a Provider:  May 4, 2022


Time Seen by a Provider:  08:10


Subjective/Events-last exam


This virtual visit was conducted using real time audio/video.


Thank you for asking us to see this patient for sepsis.


Paracentesis catheter placed 4/30/22. Copious output. S/B Hem. 


PE: VSS.  O2 sat 100% on RA





HEENT: No obvious masses, adenopathy or JVD.


              Chest: clear to auscultation.


              CV: RRR S1 S2 No murmur or added sounds.


              Abd: Non-tender. Bowel sounds Y.


              : Unremarkable. Purdy Y.


              CNS/psychiatric: Grossly intact. No obvious focal findings.


              Extremities: 1-2+ edema. Capillary refill < 3 seconds.


              Skin: unremarkable.





Results: Elevated  BUN 65, Creat 1.91.   Decreased Hb 8.4, plts 28K., Alb 1.91. 

 CXR: clear.


Available chart/ vitals / labs / images reviewed.


Video assessment done using teleICU camera, rest of exam as per RN.





A/P: Critical Care: critically ill patient. Cont.pressors., SCDs, thiamine, 

folate, bicarb drip, midodrine. Wean pressors as sanna. Replace K.


Hem consult w low plts: Seen by Palliative care .





Discussed with RN MAURICIO. Asked RN to reach out to eICU if any questions or concerns

later. 


Time spent with patient/coordination of care with other health professionals 

(mins): 15





Sepsis Event


Evaluation


Height, Weight, BMI


Height: 5'9.00"


Weight: 179lbs. 0.0oz. 81.402369ys; 34.27 BMI


Method:





Exam


Exam


Patient acknowledged, consented, and participated in this virtual visit which 

was conducted using real time audio/video


Vital Signs








  Date Time  Temp Pulse Resp B/P (MAP) Pulse Ox O2 Delivery O2 Flow Rate FiO2


 


5/4/22 09:00  114 13 86/64 98 Room Air  


 


5/4/22 08:00  105 15 100/54 94 Room Air  


 


5/4/22 07:58 36.6       


 


5/4/22 07:08  105  97/65    


 


5/4/22 07:00  107 25 97/65 100 Room Air  


 


5/4/22 07:00  104      


 


5/4/22 06:00  105 20 108/68 98 Room Air  


 


5/4/22 05:00  109 20 103/60 96 Room Air  


 


5/4/22 04:15 36.3       


 


5/4/22 04:00     100 Room Air  


 


5/4/22 04:00  103 16 90/53 93 Room Air  


 


5/4/22 03:00  109 20 90/55 97 Room Air  


 


5/4/22 02:00  103 13 93/72 96 Room Air  


 


5/4/22 01:00  101 17 86/56 985 Room Air  


 


5/4/22 01:00  101      


 


5/4/22 00:00  98 20 96/61 97 Room Air  


 


5/4/22 00:00     98 Room Air  


 


5/3/22 23:00  106 15 89/57 96 Room Air  


 


5/3/22 22:00  105 14 86/54 99 Room Air  


 


5/3/22 21:42  111  100/62    


 


5/3/22 21:00  105 17 92/61 94 Room Air  


 


5/3/22 20:00 36.8       


 


5/3/22 20:00  104 22 93/56 100 Room Air  


 


5/3/22 20:00     100 Room Air  


 


5/3/22 19:43  111 10 94/65 100 Room Air  


 


5/3/22 19:00  116 18 104/61 98 Room Air  


 


5/3/22 19:00  120      


 


5/3/22 18:00  114 14 99/63 98 Room Air  


 


5/3/22 17:00  110 17 101/64 98 Room Air  


 


5/3/22 16:00 36.0       


 


5/3/22 16:00  107 17 103/62 100 Room Air  


 


5/3/22 16:00     97 Room Air  


 


5/3/22 15:00  102 14 101/62 100 Room Air  


 


5/3/22 14:00  114 23 128/70 99 Room Air  


 


5/3/22 13:00  105 13 106/65 100 Room Air  


 


5/3/22 13:00  104      


 


5/3/22 12:00  105 18 96/61 96 Room Air  


 


5/3/22 11:57     97 Room Air  


 


5/3/22 11:37 36.2       


 


5/3/22 11:00  106 10 95/60 100 Room Air  


 


5/3/22 10:33  111  109/62    


 


5/3/22 10:00  110 24 97/64 100 Room Air  














I & O 


 


 5/4/22





 07:00


 


Intake Total 2851 ml


 


Output Total 1975 ml


 


Balance 876 ml








Height & Weight


Height: 5'9.00"


Weight: 179lbs. 0.0oz. 81.646416wa; 34.27 BMI


Method:


General Appearance:  No Apparent Distress, Chronically ill


HEENT:  PERRL/EOMI


Neck:  Full Range of Motion


Respiratory:  Lungs Clear, No Respiratory Distress


Cardiovascular:  No Murmur, Tachycardia


Capillary Refill:  Less Than 3 Seconds


Peripheral Pulses:  2+ Radial Pulses (R), 2+ Radial Pulses (L)


Gastrointestinal:  normal bowel sounds, soft


Extremity:  Normal Capillary Refill


Neurologic/Psychiatric:  Alert, Oriented x3


Skin:  Jaundice


Lymphatic:  No Adenopathy





Results


Lab


Laboratory Tests


5/3/22 04:05








5/4/22 04:41











Assessment/Plan


Assessment/Plan


See free text.


Critical Care:  Critically Ill Patient











DIANE ABEL MD           May 4, 2022 09:22

## 2022-05-04 NOTE — PROGRESS NOTE
Subjective


Date Seen by a Provider:  May 4, 2022


Time Seen by a Provider:  14:00


Subjective/Events-last exam


clinical status unchanged. liver failure and hepatorenal syndrome.





Objective


Exam





Vital Signs








  Date Time  Temp Pulse Resp B/P (MAP) Pulse Ox O2 Delivery O2 Flow Rate FiO2


 


5/4/22 13:00  116 22 86/57 92 Room Air  


 


5/4/22 13:00  116      


 


5/4/22 12:00     99 Room Air  


 


5/4/22 12:00  122 34 89/59 94 Room Air  


 


5/4/22 11:57 36.0       


 


5/4/22 11:00  124 27 84/67 97 Room Air  


 


5/4/22 10:00  120 22 92/61 96 Room Air  


 


5/4/22 09:00  114 13 86/64 98 Room Air  


 


5/4/22 08:00  105 15 100/54 94 Room Air  


 


5/4/22 08:00     99 Room Air  


 


5/4/22 07:58 36.6       


 


5/4/22 07:08  105  97/65    


 


5/4/22 07:00  107 25 97/65 100 Room Air  


 


5/4/22 07:00  104      


 


5/4/22 06:00  105 20 108/68 98 Room Air  


 


5/4/22 05:00  109 20 103/60 96 Room Air  


 


5/4/22 04:15 36.3       


 


5/4/22 04:00     100 Room Air  


 


5/4/22 04:00  103 16 90/53 93 Room Air  


 


5/4/22 03:00  109 20 90/55 97 Room Air  


 


5/4/22 02:00  103 13 93/72 96 Room Air  


 


5/4/22 01:00  101 17 86/56 985 Room Air  


 


5/4/22 01:00  101      


 


5/4/22 00:00  98 20 96/61 97 Room Air  


 


5/4/22 00:00     98 Room Air  


 


5/3/22 23:00  106 15 89/57 96 Room Air  


 


5/3/22 22:00  105 14 86/54 99 Room Air  


 


5/3/22 21:42  111  100/62    


 


5/3/22 21:00  105 17 92/61 94 Room Air  


 


5/3/22 20:00 36.8       


 


5/3/22 20:00  104 22 93/56 100 Room Air  


 


5/3/22 20:00     100 Room Air  


 


5/3/22 19:43  111 10 94/65 100 Room Air  


 


5/3/22 19:00  116 18 104/61 98 Room Air  


 


5/3/22 19:00  120      


 


5/3/22 18:00  114 14 99/63 98 Room Air  


 


5/3/22 17:00  110 17 101/64 98 Room Air  


 


5/3/22 16:00 36.0       


 


5/3/22 16:00  107 17 103/62 100 Room Air  


 


5/3/22 16:00     97 Room Air  


 


5/3/22 15:00  102 14 101/62 100 Room Air  














I & O 


 


 5/4/22





 07:00


 


Intake Total 2851 ml


 


Output Total 1975 ml


 


Balance 876 ml





Capillary Refill : Less Than 3 Seconds


General Appearance:  No Apparent Distress


HEENT:  PERRL/EOMI


Neck:  Full Range of Motion


Respiratory:  Decreased Breath Sounds


Cardiovascular:  Regular Rate, Rhythm


Gastrointestinal:  normal bowel sounds, non tender, soft


Extremity:  Normal Capillary Refill


Neurologic/Psychiatric:  Alert, Disoriented


Skin:  Normal Color


Lymphatic:  No Adenopathy





Results


Lab


Laboratory Tests


5/3/22 18:39: Glucometer 176H


5/4/22 00:25: Glucometer 176H


5/4/22 04:41: 


White Blood Count 10.3, Red Blood Count 2.29L, Hemoglobin 9.6L, Hematocrit 27L, 

Mean Corpuscular Volume 116H, Mean Corpuscular Hemoglobin 42H, Mean Corpuscular 

Hemoglobin Concent 36, Red Cell Distribution Width 14.7H, Platelet Count 38*L, 

Mean Platelet Volume 12.4H, Immature Granulocyte % (Auto) 1, Neutrophils (%) 

(Auto) 68, Lymphocytes (%) (Auto) 15, Monocytes (%) (Auto) 15H, Eosinophils (%) 

(Auto) 1, Basophils (%) (Auto) 0, Neutrophils # (Auto) 7.1, Lymphocytes # (Auto)

1.6, Monocytes # (Auto) 1.5H, Eosinophils # (Auto) 0.1, Basophils # (Auto) 0.0, 

Immature Granulocyte # (Auto) 0.1, Percent Immature Platelet Fraction 9.2H, 

Sodium Level 138, Potassium Level 3.1L, Chloride Level 100, Carbon Dioxide Level

26, Anion Gap 12, Blood Urea Nitrogen 65H, Creatinine 1.91H, Estimat Glomerular 

Filtration Rate 35, BUN/Creatinine Ratio 34, Glucose Level 142H, Calcium Level 

7.3L, Corrected Calcium 8.9, Phosphorus Level 3.9, Magnesium Level 1.9, Total 

Bilirubin 2.4H, Aspartate Amino Transf (AST/SGOT) 83H, Alanine Aminotransferase 

(ALT/SGPT) 50, Alkaline Phosphatase 99, Total Protein 4.8L, Albumin 2.0L


5/4/22 11:44: Glucometer 222H





Microbiology


4/23/22 MRSA Screen - Final, Complete


          MRSA not isolated


4/23/22 Gram Stain - Final, Complete


          


4/23/22 Wound Culture - Final, Complete


          Mixed Bacterial Marcella


          Enterococcus species


4/23/22 Blood Culture - Final, Complete


          No growth





Assessment/Plan


Assessment/Plan


Assess & Plan/Chief Complaint


liver cirrhosis s/p lap cholecystectomy 4/7/22.


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


will proceed with placement interrupted figure of 8 sutures across umbilical 

wound. 


low protein diet.


Na and fluid restriction.


cont spironolactone. 


continues to be hypotensive and tachycardic. will get CT chest/abd/pelvis.


will proceed with paracentesis.


looks more like DIC picture. pt a DNR-CC











BRENDA WELCH MD                 May 4, 2022 14:45

## 2022-05-04 NOTE — PHYSICAL THERAPY DAILY NOTE
PT Daily Note-Current


Subjective


Patient in bed pre tx, agrees to PT, has no complaints of pain.





Appearance


Patient in recliner post tx with nurse call, phone, tray, chair alarm on.





Mental Status


Patient Orientation:  Person, Confused


Attachments:  Drains, Purdy Catheter, IV





Transfers


SCALE: Activities may be completed with or without assistive devices.





6-Indepedent-patient completes the activity by him/herself with no assistance 

from a helper.


5-Set-up or Clean-up Assistance-helper sets up or cleans up; patient completes 

activity. [place] assists only prior to or  


    following the activity.


4-Supervision or Touching Assistance-helper provides verbal cues and/or 

touching/steadying and/or contact guard assistance as patient completes 

activity. Assistance may be provided   


    throughout the activity or intermittently.


3-Partial/Moderate Assistance-helper does LESS THAN HALF the effort. [place] 

lifts, holds or supports trunk or limbs, but provides less than half the effort.


2-Substantial/Maximal Assistance-helper does MORE THAN HALF the effort. [place] 

lifts or holds trunk or limbs and provides more than half the effort.


8-[id number] does ALL the effort. Patient does none of the effort to 

complete the activity. Or, the assistance of 2 or more helpers is required for 

the patient to complete the  


    activity.


If activity was not attempted, code reason:


7-Patient Refused.


9-Not Applicable-not attempted and the patient did not perform the activity 

before the current illness, exacerbation or injury.


10-Not Attempted due to Environmental Limitations-(lack of equipment, weather 

restraints, etc.).


88-Not Attempted due to Medical Conditions or Safety Concerns.


Roll Left & Right (QC):  3


Lying to Sitting/Side of Bed(Q:  3


Sit to Stand (QC):  3


Chair/Bed-to-Chair Xfer(QC):  3


min assist for supine to sit and sit to stand





Gait Training


Distance:  5'


Gait Persons Needed:  1


Gait Assistive Device:  FWW


unsteady, had some knee buckling but not completely, cues for positioning before

sitting





Exercises


Seated Therapy Exercises:  Ankle pumps, Long arc quads


Seated Reps:  20





Treatments


bed mobility and transfers, ambulation, LE strengthening





Assessment


Current Status:  Fair Progress


slowly improving general mobility but still very unsteady and weak





PT Long Term Goals


Long Term Goals


PT Long Term Goals Time Frame:  May 7, 2022


Roll Left & Right (QC):  6


Sit to Lying (QC):  6


Lying-Sitting on Side/Bed(QC):  6


Sit to Stand (QC):  6


Chair/Bed-to-Chair Xfer(QC):  6


Toilet Transfer (QC):  6


Walk 10 feet (QC):  6


Walk 50ft with 2 Turns (QC):  6


Walk 150 ft (QC):  6





PT Plan


Problem List


Problem List:  Activity Tolerance, Functional Strength, Safety, Balance, Gait, 

Transfer, Bed Mobility, ROM





Treatment/Plan


Treatment Plan:  Continue Plan of Care


Treatment Plan:  Bed Mobility, Education, Functional Activity Elia, Functional 

Strength, Gait, Safety, Therapeutic Exercise, Transfers


Treatment Duration:  May 7, 2022


Frequency:  6 times per week


Estimated Hrs Per Day:  .25 hour per day


Patient and/or Family Agrees t:  Yes





Safety Risks/Education


Patient Education:  Gait Training, Transfer Techniques, Correct Positioning, 

Safety Issues


Teaching Recipient:  Patient


Teaching Methods:  Demonstration, Discussion


Response to Teaching:  Reinforcement Needed





Time/GCodes


Time In:  0915


Time Out:  0925


Total Billed Treatment Time:  10


Total Billed Treatment


1 visit


FA 10'











KRTEK,FARSHAD PT                 May 4, 2022 09:35

## 2022-05-04 NOTE — PROGRESS NOTE - HOSPITALIST
Subjective


HPI/CC On Admission


Date Seen by Provider:  May 4, 2022


Patient is 81-year-old  male past medical history of cirrhosis, 

hypertension, and recent cholecystectomy who presented to the emergency room due

to drainage from surgical wounds and low blood pressure.  He is a very poor 

historian but per the ER note he also complained of nausea and decreased 

appetite.  He told me his surgery was 2 years ago though records reveal it was 

much more recent than that (4/7).  He does not complain of any of this to me and

states he is feeling very well.  When asked why he came to the hospital then if 

he was feeling well he states because his sister is a nurse.  Reviewing records 

reveals he was seen in the emergency department last week for drainage from 

surgical incisions and a suture was placed.  He saw Dr. WELCH's nurse 

practitioner on April 20 for removal.  Since then he has continued to have 

drainage.  He also continues to drink 1-2 glasses of wine a day.


Subjective/Events-last exam


Pt reports doing well. Asks about going home again. We discussed the reasons why

I am unable to discharge him as he is still on two pressors. He just shrugs.





Objective


Exam


Vital Signs





Vital Signs








  Date Time  Temp Pulse Resp B/P (MAP) Pulse Ox O2 Delivery O2 Flow Rate FiO2


 


5/4/22 07:58 36.6       


 


5/4/22 07:08  105  97/65    


 


5/4/22 06:00   20  98 Room Air  


 


5/3/22 19:43        





Capillary Refill : Less Than 3 Seconds


General Appearance:  No Apparent Distress, Chronically ill


Respiratory:  Lungs Clear, No Respiratory Distress


Cardiovascular:  No Murmur, Tachycardia


Gastrointestinal:  Distended; No Tenderness


Neurologic/Psychiatric:  Alert, Oriented x3





Results/Procedures


Lab


Laboratory Tests


5/4/22 04:41








Patient resulted labs reviewed.


Imaging:  Reviewed Imaging Report





Assessment/Plan


Assessment and Plan


Assess & Plan/Chief Complaint


Septic shock


Oliguric RITA


Hepatorenal syndrome


Recent cholecystectomy


Possible SBP


Thrombocytopenia


DIC


Melena


End stage liver disease


Decompensated cirrhosis


Poor prognosis


Goals of care discussion


   Cultures with Enterococcus 


   CT chest/abdomen with moderate ascites- Surgery placed drain over the weekend


   Completed Unasyn per c/s


   Continue IVF with bicarb- Crt improved slightly today


   Continue pressors- still on 2, on oral midodrine as well-increase to 15mg TID


   Peritoneal catheter in place, draining over 4L per day


   IV PPI BID


   TeleICU following


   Surgery following


   Cardiology following


   Palliative care following- would be a good candidate for hospice though 

family not agreeable at this time


   [place] referral placed due to need for pressor weaning, continued therapy





DVT ppx: SCDs duet to thrombocytopenia





Critical Care


Critically Ill Patient











HALEY STEVE MD               May 4, 2022 08:19

## 2022-05-04 NOTE — PROGRESS NOTE - CARDIOLOGY
Cardiology SOAP Progress Note


Subjective:


Lying in bed


States he wants to go home


No c/o CP, SOB or palpitations





Objective:


I&O/Vital Signs











 5/3/22 5/3/22 5/3/22 5/3/22





 21:00 21:42 22:00 23:00


 


Pulse 105 111 105 106


 


Resp 17  14 15


 


B/P (MAP) 92/61 100/62 86/54 89/57


 


Pulse Ox 94  99 96


 


O2 Delivery Room Air  Room Air Room Air





 5/4/22 5/4/22 5/4/22 5/4/22





 00:00 00:00 01:00 01:00


 


Pulse  98 101 101


 


Resp  20  17


 


B/P (MAP)  96/61  86/56


 


Pulse Ox 98 97  985


 


O2 Delivery Room Air Room Air  Room Air





 5/4/22 5/4/22 5/4/22 5/4/22





 02:00 03:00 04:00 04:00


 


Pulse 103 109 103 


 


Resp 13 20 16 


 


B/P (MAP) 93/72 90/55 90/53 


 


Pulse Ox 96 97 93 100


 


O2 Delivery Room Air Room Air Room Air Room Air





 5/4/22 5/4/22 5/4/22 5/4/22





 04:15 05:00 06:00 07:00


 


Temp 36.3   


 


Pulse  109 105 104


 


Resp  20 20 


 


B/P (MAP)  103/60 108/68 


 


Pulse Ox  96 98 


 


O2 Delivery  Room Air Room Air 


 


    





 5/4/22 5/4/22  





 07:08 07:58  


 


Temp  36.6  


 


Pulse 105   


 


B/P (MAP) 97/65   














 5/4/22





 00:00


 


Intake Total 600 ml


 


Output Total 1600 ml


 


Balance -1000 ml








Weight (Pounds):  179


Weight (Ounces):  0.0


Weight (Calculated Kilograms):  81.147220


Constitutional:  AAO x 3, well-developed, well-nourished


Respiratory:  other (diminished air entry especially at the bases)


Cardiovascular:  regular rate-rhythm, S1 and S2, systolic murmur (soft JOESPH at 

card base)


Gastrointestional:  distended, other (peritoneal drain in place)


Extremities:  swelling (mod edema of all extremities)


Neurologic/Psychiatric:  oriented x 3, other (moves all limbs)


Skin:  No rash on exposed areas, No ulcerations on exposed areas; other 

(multiple areas of ecchymosis to torso)





Results/Procedures:


Labs


Laboratory Tests


5/3/22 11:24: Glucometer 151H


5/3/22 18:39: Glucometer 176H


5/4/22 00:25: Glucometer 176H


5/4/22 04:41: 


White Blood Count 10.3, Red Blood Count 2.29L, Hemoglobin 9.6L, Hematocrit 27L, 

Mean Corpuscular Volume 116H, Mean Corpuscular Hemoglobin 42H, Mean Corpuscular 

Hemoglobin Concent 36, Red Cell Distribution Width 14.7H, Platelet Count 38*L, 

Mean Platelet Volume 12.4H, Immature Granulocyte % (Auto) 1, Neutrophils (%) 

(Auto) 68, Lymphocytes (%) (Auto) 15, Monocytes (%) (Auto) 15H, Eosinophils (%) 

(Auto) 1, Basophils (%) (Auto) 0, Neutrophils # (Auto) 7.1, Lymphocytes # (Auto)

1.6, Monocytes # (Auto) 1.5H, Eosinophils # (Auto) 0.1, Basophils # (Auto) 0.0, 

Immature Granulocyte # (Auto) 0.1, Percent Immature Platelet Fraction 9.2H, So

dium Level 138, Potassium Level 3.1L, Chloride Level 100, Carbon Dioxide Level 

26, Anion Gap 12, Blood Urea Nitrogen 65H, Creatinine 1.91H, Estimat Glomerular 

Filtration Rate 35, BUN/Creatinine Ratio 34, Glucose Level 142H, Calcium Level 

7.3L, Corrected Calcium 8.9, Phosphorus Level 3.9, Magnesium Level 1.9, Total 

Bilirubin 2.4H, Aspartate Amino Transf (AST/SGOT) 83H, Alanine Aminotransferase 

(ALT/SGPT) 50, Alkaline Phosphatase 99, Total Protein 4.8L, Albumin 2.0L





Microbiology


4/23/22 MRSA Screen - Final, Complete


          MRSA not isolated


4/23/22 Gram Stain - Final, Complete


          


4/23/22 Wound Culture - Final, Complete


          Mixed Bacterial Marcella


          Enterococcus species


4/23/22 Blood Culture - Final, Complete


          No growth





Laboratory Tests


5/3/22 04:05








5/4/22 04:41











A/P:


Assessment:


Acute on chronic hepatic failure, complicated by the following:


* Sepsis and septic shock


* Probable peritonitis following recent cholecystectomy - peritoneal drain in 

  place


* Generalized anasarca likely due to hypoproteinemia


* Probable hepatorenal syndrome and acute kidney injury


* Probable DIC


* Severe metabolic acidosis - improved/resolved with IVF of 1/2 NS with Bicarb





H/O heavy ETOH usage





CAD


- Cardiac cath of 3-1-16 (following a 3-beat run of WCT on 2/19/16) showed 

angiographically mild CAD. Normal global LV systolic function with an LVEF of 

approx 60%. Normal LVEDP. No significant MR.


- MPI of 6/18/19 showed no ischemia or infarction and EF 72%


- Echo on 6/20/19: LVEF 60-65%, grade 1 hunter dysfunction, mild MR< mild TR, RVSP

30 mmHg





H/o calcific densities in the pulmonary fields for which we states he been 

following with his PCP.


- States he has h/o histoplasmosis





Carotid dz


- Carotid u/s on 6-4-21: minimal plaque





Hyperlipidemia


- Not suitable for statin therapy due to chronic liver enz elev





Fam h/o early CAD





Poor prognosis


Plan:





* Complex management and poor prognosis


* Sinus tach - improved after correction of acidois


* Still requiring pressor support


* Management of sepsis per medical/surgical/eICU services


* End stage liver dz with DIC - management per medical/surgical services


* Longterm prognosis appears poor


* Replace electrolytes











VANESSA MELGOZA [facility]            May 4, 2022 08:24

## 2022-05-05 VITALS — SYSTOLIC BLOOD PRESSURE: 141 MMHG | DIASTOLIC BLOOD PRESSURE: 64 MMHG

## 2022-05-05 NOTE — DISCHARGE INST-SIMPLE/STANDARD
Discharge Inst-Standard


Patient Instructions/Follow Up


Plan of Care/Instructions/FU:  


Please continue to take your medications as written. Please follow up with


your primary care doctor and hospice to follow up this hospital stay.


Activity as Tolerated:  Yes


Discharge Diet:  No Restrictions


Return to The Hospital For:  


Uncontrolled pain, shortness of breath, if you feel you are getting worse


without relief.











HALEY STEVE MD               May 5, 2022 09:11

## 2022-05-05 NOTE — DISCHARGE SUMMARY
Diagnosis/Chief Complaint


Date of Admission


Apr 23, 2022 at 17:30


Date of Discharge





Discharge Date:  May 5, 2022


Admission Diagnosis


Sepsis


Primary Care


No,Local Physician


Discharge Diagnosis





(1) Septic shock


Status:  Acute


(2) DIC (disseminated intravascular coagulation)


Status:  Acute


(3) Severe sepsis


Status:  Acute


(4) Abdominal infection


Status:  Acute


(5) S/P cholecystectomy


Status:  Chronic


(6) End-stage liver disease


Status:  Acute


(7) Poor prognosis


Status:  Acute


(8) Hypotension


Status:  Acute





Discharge Summary


Discharge Physical Exam


Allergies:  


Coded Allergies:  


     No Known Drug Allergies (Unverified , 4/7/22)


Vitals & I&Os





Vital Signs








  Date Time  Temp Pulse Resp B/P (MAP) Pulse Ox O2 Delivery O2 Flow Rate FiO2


 


5/5/22 12:36 37.3 87 18 141/64 96 Room Air  


 


5/5/22 10:33       0.00 








General Appearance:  No Apparent Distress, Chronically ill


Cardiovascular:  Regular Rate, Rhythm, No Murmur


Gastrointestinal:  Distended





Hospital Course


Patient was admitted to the hospital secondary to septic shock from SBP with 

acutely decompensated cirrhosis and end-stage liver disease.  He was treated 

with IV antibiotics and required pressors.  Surgery was consulted and placed a 

peritoneal drain.  Despite many days of IV antibiotics and multiple vasopressors

he was unable to be weaned off vasoactive medicines.  Multiple discussions were 

had with both the patient and his wife regarding his prognosis.  They were 

offered multiple options including transfer to [place] for continued 

vasopressor weaning to home with hospice.  After discussion with family they 

elected to transition to comfort care and go home with hospice [facility].


Labs (last 24 hrs)


Laboratory Tests


5/4/22 17:36: Glucometer 149H





Microbiology


4/23/22 MRSA Screen - Final, Complete


          MRSA not isolated


4/23/22 Gram Stain - Final, Complete


          


4/23/22 Wound Culture - Final, Complete


          Mixed Bacterial Marcella


          Enterococcus species


4/23/22 Blood Culture - Final, Complete


          No growth


Patient resulted labs reviewed.


Imaging:  Reviewed Imaging Report





Discussion & Recommendations


Discharge Planning:  >30 minutes discharge planning





Discharge


Home Medications:





Active Scripts


Active


Cyclobenzaprine HCl 5 Mg Tablet 5 Mg PO TID


Reported


l-Glutamine (Glutamine) 500 Mg Capsule 500 Mg PO DAILY


Vitamin B Complex 1 Each Capsule 1 Each PO DAILY


Milk Thistle 175 Mg Tablet 375 Mg PO DAILY





Instructions to patient/family


Please see electronic discharge instructions given to patient.











HALEY STEVE MD               May 5, 2022 09:12

## 2023-12-22 NOTE — PHYSICAL THERAPY DAILY NOTE
Lino RN received pt to [facility] A&Ox4 noted to be extremely anxious on assessment, diaphoretic and tachypneic repeatedly saying "I just got the shock of my life" refusing to answer medical questions, finally states "I just found out my girlfriend cheated on me", noted to be hyperventilating. Reports PMH of "autoimmune disease", states "I'm a fit rick I have no other medical problems". MD At bedside, IV placed, labs sent, VS as documented, will endorse to primary RN. PT Daily Note-Current


Subjective


Patient is very confused stating,"I'm going home today.  My wife should be here 

to get me soon."





Mental Status


Patient Orientation:  Confused


Attachments:  Drains, Purdy Catheter, IV





Transfers


SCALE: Activities may be completed with or without assistive devices.





6-Indepedent-patient completes the activity by him/herself with no assistance 

from a helper.


5-Set-up or Clean-up Assistance-helper sets up or cleans up; patient completes 

activity. [place] assists only prior to or  


    following the activity.


4-Supervision or Touching Assistance-helper provides verbal cues and/or 

touching/steadying and/or contact guard assistance as patient completes 

activity. Assistance may be provided   


    throughout the activity or intermittently.


3-Partial/Moderate Assistance-helper does LESS THAN HALF the effort. [place] 

lifts, holds or supports trunk or limbs, but provides less than half the effort.


2-Substantial/Maximal Assistance-helper does MORE THAN HALF the effort. [place] 

lifts or holds trunk or limbs and provides more than half the effort.


0-Tyqddkriu-elzmzp does ALL the effort. Patient does none of the effort to 

complete the activity. Or, the assistance of 2 or more helpers is required for 

the patient to complete the  


    activity.


If activity was not attempted, code reason:


7-Patient Refused.


9-Not Applicable-not attempted and the patient did not perform the activity b

efore the current illness, exacerbation or injury.


10-Not Attempted due to Environmental Limitations-(lack of equipment, weather 

restraints, etc.).


88-Not Attempted due to Medical Conditions or Safety Concerns.


Roll Left & Right (QC):  3


patient incontinent BM requiring dependent assist to cleanse and change bedding 

and gown





Assessment


Patient tolerated minimal activity and declined EOB or OOB activity after 

rolling activity.  Will attempt to increase activity this week.





PT Long Term Goals


Long Term Goals


PT Long Term Goals Time Frame:  May 7, 2022


Roll Left & Right (QC):  6


Sit to Lying (QC):  6


Lying-Sitting on Side/Bed(QC):  6


Sit to Stand (QC):  6


Chair/Bed-to-Chair Xfer(QC):  6


Toilet Transfer (QC):  6


Walk 10 feet (QC):  6


Walk 50ft with 2 Turns (QC):  6


Walk 150 ft (QC):  6





PT Plan


Treatment/Plan


Treatment Plan:  Continue Plan of Care


Treatment Plan:  Bed Mobility, Education, Functional Activity Elia, Functional 

Strength, Gait, Safety, Therapeutic Exercise, Transfers


Treatment Duration:  May 7, 2022


Frequency:  6 times per week


Estimated Hrs Per Day:  .25 hour per day


Patient and/or Family Agrees t:  Yes





Time/GCodes


Time In:  1015


Time Out:  1031


Total Billed Treatment Time:  16


Total Billed Treatment


1 visit


FA 16 min











CY SALAS PT               May 3, 2022 11:17 22-Dec-2023 18:27

## 2024-08-29 NOTE — PROGRESS NOTE
Subjective


Date Seen by a Provider:  Apr 25, 2022


Time Seen by a Provider:  18:00


Subjective/Events-last exam


doing ok. still has small leak of ascites per umbilical wound. 5mm port wounds 

have healed. only small area of leak around 10mm wound. no redness/erythema.





Focused Exam


Lactate Level


4/24/22 10:44: Lactic Acid Level 2.93*H


4/24/22 12:52: Lactic Acid Level 3.22*H


4/24/22 17:08: Lactic Acid Level 2.39*H





Objective


Exam





Vital Signs








  Date Time  Temp Pulse Resp B/P (MAP) Pulse Ox O2 Delivery O2 Flow Rate FiO2


 


4/25/22 17:00  141 38 102/73 99 Room Air  


 


4/25/22 16:00     97 Room Air  


 


4/25/22 16:00  74 27 96/68 99 Room Air  


 


4/25/22 15:52  120  82/54    


 


4/25/22 15:22 36.8       


 


4/25/22 15:00  129 25 87/61 98 Room Air  


 


4/25/22 14:00  130 14 86/64 100 Room Air  


 


4/25/22 13:00  130 14 100/77 98 Room Air  


 


4/25/22 12:53  126      


 


4/25/22 12:00  117 22 82/48 96 Room Air  


 


4/25/22 12:00 36.4       


 


4/25/22 12:00     97 Room Air  


 


4/25/22 11:00  124 17 88/56 97 Room Air  


 


4/25/22 10:00  118 22 99/57 100 Room Air  


 


4/25/22 09:00  120 24 100/64 100 Room Air  


 


4/25/22 08:00  118 19 89/60 99 Room Air  


 


4/25/22 08:00     97 Room Air  


 


4/25/22 08:00 36.2       


 


4/25/22 07:57  120      


 


4/25/22 07:00  116 21 85/45 98 Room Air  


 


4/25/22 06:00  124 24 92/59 99 Room Air  


 


4/25/22 05:00  114 19 99/64 96 Room Air  


 


4/25/22 04:00  134 21 115/75 100 Room Air  


 


4/25/22 03:50     97 Room Air  


 


4/25/22 03:50 36.3     Room Air  


 


4/25/22 03:00  112 23 94/57 97 Room Air  


 


4/25/22 02:00  117 23 100/64 96 Room Air  


 


4/25/22 01:00  120      


 


4/25/22 01:00  120 30 95/71 97 Room Air  


 


4/25/22 00:00  117 16 92/71 99 Room Air  


 


4/24/22 23:20 36.9     Room Air  


 


4/24/22 23:20     99 Room Air  


 


4/24/22 23:00  109 32 104/60 95 Room Air  


 


4/24/22 22:00  112 22 99/61 95 Room Air  


 


4/24/22 21:00  121 26 92/59 95 Room Air  


 


4/24/22 20:00  114 21 90/58 97 Room Air  


 


4/24/22 20:00 37.7       


 


4/24/22 19:15     100 Room Air  


 


4/24/22 19:00  128      


 


4/24/22 19:00 36.4 120 25 91/70 100 Room Air  














I & O 


 


 4/25/22





 07:00


 


Intake Total 2425 ml


 


Output Total 1325 ml


 


Balance 1100 ml





Capillary Refill : Less Than 3 Seconds


General Appearance:  No Apparent Distress


HEENT:  PERRL/EOMI


Neck:  Full Range of Motion


Respiratory:  Chest Non Tender, Lungs Clear, Normal Breath Sounds


Cardiovascular:  Regular Rate, Rhythm


Gastrointestinal:  normal bowel sounds, non tender, soft, other (small area of 

leakage ascitic fluid from pinpoint area of umbilical inc)


Extremity:  Normal Capillary Refill


Neurologic/Psychiatric:  Alert, Oriented x3


Skin:  Normal Color


Lymphatic:  No Adenopathy





Results


Lab


Laboratory Tests


4/25/22 05:50: 


White Blood Count 9.5, Red Blood Count 2.72L, Hemoglobin 11.6L, Hematocrit 32L, 

Mean Corpuscular Volume 118H, Mean Corpuscular Hemoglobin 43H, Mean Corpuscular 

Hemoglobin Concent 36, Red Cell Distribution Width 12.8, Platelet Count 109L, 

Mean Platelet Volume 11.2, Immature Granulocyte % (Auto) 1, Neutrophils (%) 

(Auto) 74, Lymphocytes (%) (Auto) 10L, Monocytes (%) (Auto) 15H, Eosinophils (%)

(Auto) 0, Basophils (%) (Auto) 0, Neutrophils # (Auto) 7.0, Lymphocytes # (Auto)

0.9L, Monocytes # (Auto) 1.5H, Eosinophils # (Auto) 0.0, Basophils # (Auto) 0.0,

Immature Granulocyte # (Auto) 0.1, Percent Immature Platelet Fraction 3.9, 

Sodium Level 137, Potassium Level 4.4, Chloride Level 109H, Carbon Dioxide Level

14L, Anion Gap 14, Blood Urea Nitrogen 36H, Creatinine 1.35H, Estimat Glomerular

Filtration Rate 53, BUN/Creatinine Ratio 27, Glucose Level 166H, Calcium Level 

8.2L, Corrected Calcium 9.5, Phosphorus Level 2.5, Magnesium Level 1.9, Total 

Bilirubin 3.5H, Aspartate Amino Transf (AST/SGOT) 140H, Alanine Aminotransferase

(ALT/SGPT) 95H, Alkaline Phosphatase 97, Total Protein 5.3L, Albumin 2.4L





Microbiology


4/23/22 MRSA Screen - Final, Complete


          MRSA not isolated


4/23/22 Gram Stain - Final, Resulted


          


4/23/22 Wound Culture - Preliminary, Resulted


          Mixed Bacterial Marcella


          Enterococcus species


4/23/22 Blood Culture - Preliminary, Resulted


          No growth





Assessment/Plan


Assessment/Plan


Assess & Plan/Chief Complaint


liver cirrhosis s/p lap cholecystectomy 4/7/22.


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


will proceed with placement interrupted figure of 8 sutures across umbilical 

wound. 


low protein diet.


Na and fluid restriction.


cont spironolactone. 


ok for home when ok with IM.











BRENDA WELCH MD                Apr 25, 2022 18:13 Quality 431: Preventive Care And Screening: Unhealthy Alcohol Use - Screening: Patient not identified as an unhealthy alcohol user when screened for unhealthy alcohol use using a systematic screening method Detail Level: Detailed Quality 226: Preventive Care And Screening: Tobacco Use: Screening And Cessation Intervention: Patient screened for tobacco use and is an ex/non-smoker